# Patient Record
Sex: FEMALE | Employment: FULL TIME | URBAN - METROPOLITAN AREA
[De-identification: names, ages, dates, MRNs, and addresses within clinical notes are randomized per-mention and may not be internally consistent; named-entity substitution may affect disease eponyms.]

---

## 2018-12-28 ENCOUNTER — OFFICE VISIT (OUTPATIENT)
Dept: FAMILY MEDICINE CLINIC | Age: 58
End: 2018-12-28

## 2018-12-28 VITALS
HEART RATE: 84 BPM | RESPIRATION RATE: 18 BRPM | BODY MASS INDEX: 26.13 KG/M2 | DIASTOLIC BLOOD PRESSURE: 65 MMHG | HEIGHT: 66 IN | WEIGHT: 162.6 LBS | OXYGEN SATURATION: 99 % | TEMPERATURE: 96.5 F | SYSTOLIC BLOOD PRESSURE: 101 MMHG

## 2018-12-28 DIAGNOSIS — H65.91 RIGHT NON-SUPPURATIVE OTITIS MEDIA: Primary | ICD-10-CM

## 2018-12-28 DIAGNOSIS — H92.03 OTALGIA OF BOTH EARS: ICD-10-CM

## 2018-12-28 RX ORDER — PSEUDOEPHEDRINE HCL 30 MG
60 TABLET ORAL 3 TIMES DAILY
Qty: 24 TAB | Refills: 1 | Status: SHIPPED | OUTPATIENT
Start: 2018-12-28 | End: 2018-12-31

## 2018-12-28 RX ORDER — CONJUGATED ESTROGENS AND MEDROXYPROGESTERONE ACETATE .625; 2.5 MG/1; MG/1
TABLET, SUGAR COATED ORAL
Refills: 99 | COMMUNITY
Start: 2018-10-10 | End: 2021-01-01

## 2018-12-28 RX ORDER — ALPRAZOLAM 1 MG/1
TABLET ORAL
Refills: 0 | COMMUNITY
Start: 2018-10-09 | End: 2021-01-01 | Stop reason: SDUPTHER

## 2018-12-28 RX ORDER — AZITHROMYCIN 250 MG/1
TABLET, FILM COATED ORAL
Qty: 6 TAB | Refills: 0 | Status: SHIPPED | OUTPATIENT
Start: 2018-12-28 | End: 2021-01-01

## 2018-12-28 RX ORDER — ROSUVASTATIN CALCIUM 10 MG/1
TABLET, COATED ORAL
Refills: 99 | COMMUNITY
Start: 2018-12-02 | End: 2022-01-01

## 2018-12-28 NOTE — PATIENT INSTRUCTIONS
Earache: Care Instructions  Your Care Instructions    Even though infection is a common cause of ear pain, not all ear pain means an infection. If you have ear pain and don't have an infection, it could be because of a jaw problem, such as temporomandibular joint (TMJ) pain. Or it could be because of a neck problem. When ear discomfort or pain is mild or comes and goes without other symptoms, home treatment may be all you need. Follow-up care is a key part of your treatment and safety. Be sure to make and go to all appointments, and call your doctor if you are having problems. It's also a good idea to know your test results and keep a list of the medicines you take. How can you care for yourself at home? · Apply heat on the ear to ease pain. To apply heat, put a warm water bottle, a heating pad set on low, or a warm cloth on your ear. Do not go to sleep with a heating pad on your skin. · Take an over-the-counter pain medicine, such as acetaminophen (Tylenol), ibuprofen (Advil, Motrin), or naproxen (Aleve). Be safe with medicines. Read and follow all instructions on the label. · Do not take two or more pain medicines at the same time unless the doctor told you to. Many pain medicines have acetaminophen, which is Tylenol. Too much acetaminophen (Tylenol) can be harmful. · Never insert anything, such as a cotton swab or a devante pin, into the ear. When should you call for help? Call your doctor now or seek immediate medical care if:    · You have new or worse symptoms of infection, such as:  ? Increased pain, swelling, warmth, or redness. ? Red streaks leading from the area. ? Pus draining from the area. ? A fever.    Watch closely for changes in your health, and be sure to contact your doctor if:    · You have new or worse discharge coming from the ear.     · You do not get better as expected. Where can you learn more? Go to http://abdirashid-daniela.info/.   Enter A515 in the search box to learn more about \"Earache: Care Instructions. \"  Current as of: March 28, 2018  Content Version: 11.8  © 1438-9787 [employer]wise, [employer]. Care instructions adapted under license by [employer] (which disclaims liability or warranty for this information). If you have questions about a medical condition or this instruction, always ask your healthcare professional. [address] any warranty or liability for your use of this information.

## 2018-12-28 NOTE — PROGRESS NOTES
Subjective:      Anand Villavicencio is a 66 y.o. female who presents for possible ear infection. Symptoms include bilateral ear pain. Onset of symptoms was 3 days ago, gradually worsening since that time. Associated symptoms include congestion, which have been present for 2 days . She is drinking plenty of fluids. Problem List:   There are no active problems to display for this patient. Medical History:     Past Medical History:   Diagnosis Date    Hypercholesterolemia      Allergies:   No Known Allergies   Medications:     Current Outpatient Medications   Medication Sig    ALPRAZolam (XANAX) 1 mg tablet TAKE 1 TABLET BY MOUTH EVERY 4 HOURS AS NEEDED FOR ANXIETY    PREMPRO 0.625-2.5 mg per tablet TAKE 1 TABLET BY MOUTH EVERY DAY ONCE A DAY ORALLY 28    rosuvastatin (CRESTOR) 10 mg tablet TAKE 1 TABLET BY MOUTH EVERY DAY    azithromycin (ZITHROMAX) 250 mg tablet Take by mouth, take two tablets today then one tablet daily for 4 more days.  pseudoephedrine (SUDAFED) 30 mg tablet Take 2 Tabs by mouth three (3) times daily for 3 days. No current facility-administered medications for this visit. Surgical History:     Past Surgical History:   Procedure Laterality Date    HX BREAST AUGMENTATION      HX HERNIA REPAIR       Social History:     Social History     Socioeconomic History    Marital status: UNKNOWN     Spouse name: Not on file    Number of children: Not on file    Years of education: Not on file    Highest education level: Not on file   Tobacco Use    Smoking status: Former Smoker     Years: 30.00     Last attempt to quit: 2016     Years since quittin.9    Smokeless tobacco: Never Used   Substance and Sexual Activity    Alcohol use: Yes     Alcohol/week: 1.2 oz     Types: 2 Cans of beer per week     Comment: socially    Drug use: No    Sexual activity: Not Currently         Objective:     ROS:   Feeling well. No dyspnea or chest pain on exertion.   No abdominal pain, change in bowel habits, black or bloody stools. No urinary tract symptoms. GYN ROS: normal menses, no abnormal bleeding, pelvic pain or discharge, no breast pain or new or enlarging lumps on self exam. No neurological complaints. OBJECTIVE:   The patient appears well, alert, oriented x 3, in no distress. Visit Vitals  /65 (BP 1 Location: Left arm, BP Patient Position: Sitting)   Pulse 84   Temp 96.5 °F (35.8 °C) (Oral)   Resp 18   Ht 5' 6\" (1.676 m)   Wt 162 lb 9.6 oz (73.8 kg)   SpO2 99%   BMI 26.24 kg/m²     HEENT:ENT left ear normal, right TM with bulge, erythema. Neck supple. No adenopathy or thyromegaly. SLADE. Chest: Lungs are clear, good air entry, no wheezes, rhonchi or rales. Cardiovascular: S1 and S2 normal, no murmurs, regular rate and rhythm. Abdomen: soft without tenderness, guarding, mass or organomegaly. Extremities: show no edema, normal peripheral pulses. Neurological: is normal, no focal findings. BREAST EXAM: breasts appear normal, no suspicious masses, no skin or nipple changes or axillary nodes    PELVIC EXAM: normal external genitalia, vulva, vagina, cervix, uterus and adnexa    Assessment/Plan:       ICD-10-CM ICD-9-CM    1. Right non-suppurative otitis media H65.91 381.4 azithromycin (ZITHROMAX) 250 mg tablet      pseudoephedrine (SUDAFED) 30 mg tablet   2. Otalgia of both ears H92.03 388.70 REFERRAL TO PRIMARY CARE   .

## 2018-12-28 NOTE — PROGRESS NOTES
Grace Lam is a 66 y.o. female    Patient went to [facility] 10 days for ear pain amoxicillin prescribed amoxicillin in [facility] she complete    No chief complaint on file. 1. Have you been to the ER, urgent care clinic since your last visit? Hospitalized since your last visit? No  M  2. Have you seen or consulted any other health care providers outside of the [address] [place] [address] since your last visit? Include any pap smears or colon screening. No      Visit Vitals  /65 (BP 1 Location: Left arm, BP Patient Position: Sitting)   Pulse 84   Temp 96.5 °F (35.8 °C) (Oral)   Resp 18   Ht 5' 6\" (1.676 m)   Wt 162 lb 9.6 oz (73.8 kg)   SpO2 99%   BMI 26.24 kg/m²           Health Maintenance Due   Topic Date Due    DTaP/Tdap/Td series (1 - Tdap) 06/29/1983    PAP AKA CERVICAL CYTOLOGY  06/29/1983    Shingrix Vaccine Age 50> (1 of 2) 06/29/2012    BREAST CANCER SCRN MAMMOGRAM  06/29/2012    FOBT Q 1 YEAR AGE 50-75  06/29/2012    Influenza Age 9 to Adult  08/01/2018         Medication Reconciliation completed, changes noted.   Please  Update medication list.

## 2019-08-12 ENCOUNTER — OFFICE VISIT (OUTPATIENT)
Dept: FAMILY MEDICINE CLINIC | Age: 59
End: 2019-08-12

## 2019-08-12 VITALS
TEMPERATURE: 97.8 F | SYSTOLIC BLOOD PRESSURE: 100 MMHG | WEIGHT: 151 LBS | HEIGHT: 66 IN | OXYGEN SATURATION: 97 % | BODY MASS INDEX: 24.27 KG/M2 | DIASTOLIC BLOOD PRESSURE: 51 MMHG | HEART RATE: 88 BPM

## 2019-08-12 DIAGNOSIS — R21 RASH: ICD-10-CM

## 2019-08-12 DIAGNOSIS — B34.9 VIRAL ILLNESS: Primary | ICD-10-CM

## 2019-08-12 RX ORDER — METHYLPREDNISOLONE 4 MG/1
TABLET ORAL
Qty: 1 DOSE PACK | Refills: 0 | Status: SHIPPED | OUTPATIENT
Start: 2019-08-12 | End: 2021-01-01

## 2019-08-12 NOTE — PROGRESS NOTES
HISTORY OF PRESENT ILLNESS  Reymundo Lynn is a 64 y.o. female. Patient reports itchy rash of palms of hands and soles of feet since yesterday. No blisters. She also noticed a rash around her mouth today. It is very itchy also. She has tried benadryl with little to no relief. No new foods, medications, or skin care products. She did clean out the horse barn the other day. She has a grandson that she has been around, but he doesn't have any similar symptoms. Denies fever, nausea, joint or body aches. Main complaint is itching. No pain. Visit Vitals  /51 (BP 1 Location: Right arm, BP Patient Position: Sitting)   Pulse 88   Temp 97.8 °F (36.6 °C) (Oral)   Ht 5' 6\" (1.676 m)   Wt 151 lb (68.5 kg)   SpO2 97%   BMI 24.37 kg/m²       HPI    Review of Systems   Skin: Positive for itching and rash. Physical Exam   Constitutional: She is oriented to person, place, and time. She appears well-developed and well-nourished. HENT:   Mouth/Throat: Uvula is midline and mucous membranes are normal. Posterior oropharyngeal erythema (mild, right side appears to be small blister post-pharyngeal) present. Neurological: She is alert and oriented to person, place, and time. Skin: Rash (confluent macular rash of soles of feet and palms of hands; small sandpaper-like rash around mouth;; no blisters or hives) noted. Psychiatric: She has a normal mood and affect. Non-tender with palpation of rash  ASSESSMENT and PLAN    ICD-10-CM ICD-9-CM    1. Viral illness B34.9 079.99    2.  Rash R21 782.1      Orders Placed This Encounter    methylPREDNISolone (MEDROL DOSEPACK) 4 mg tablet   patient is going on vacation x 2 weeks-patient has developed rash that has progressed to hives in the past; no known trigger this time; she requests steroids in case it gets worse and progresses to hives    Most likely Hand, Foot, Mouth    Advised patient this rash seems to be Viral   Follow-up if rash changes or worsens  Consider labs if symptoms worsen

## 2021-01-01 ENCOUNTER — HOSPITAL ENCOUNTER (OUTPATIENT)
Dept: MRI IMAGING | Age: 61
Discharge: HOME | End: 2021-12-01
Attending: NEUROLOGICAL SURGERY
Payer: COMMERCIAL

## 2021-01-01 ENCOUNTER — TELEPHONE (OUTPATIENT)
Dept: ONCOLOGY | Age: 61
End: 2021-01-01

## 2021-01-01 ENCOUNTER — TELEPHONE (OUTPATIENT)
Dept: PALLATIVE CARE | Age: 61
End: 2021-01-01

## 2021-01-01 ENCOUNTER — TRANSCRIBE ORDER (OUTPATIENT)
Dept: SCHEDULING | Age: 61
End: 2021-01-01

## 2021-01-01 ENCOUNTER — HOSPITAL ENCOUNTER (OUTPATIENT)
Dept: CT IMAGING | Age: 61
Discharge: HOME | End: 2021-07-23
Attending: PHYSICIAN ASSISTANT
Payer: COMMERCIAL

## 2021-01-01 ENCOUNTER — APPOINTMENT (OUTPATIENT)
Dept: GENERAL RADIOLOGY | Age: 61
DRG: 025 | End: 2021-01-01
Attending: ANESTHESIOLOGY
Payer: COMMERCIAL

## 2021-01-01 ENCOUNTER — OFFICE VISIT (OUTPATIENT)
Dept: ONCOLOGY | Age: 61
End: 2021-01-01
Payer: COMMERCIAL

## 2021-01-01 ENCOUNTER — HOSPITAL ENCOUNTER (OUTPATIENT)
Dept: RADIATION THERAPY | Age: 61
Discharge: HOME | End: 2021-10-21

## 2021-01-01 ENCOUNTER — TELEPHONE (OUTPATIENT)
Dept: FAMILY MEDICINE CLINIC | Age: 61
End: 2021-01-01

## 2021-01-01 ENCOUNTER — HOSPITAL ENCOUNTER (OUTPATIENT)
Dept: MRI IMAGING | Age: 61
Discharge: HOME | End: 2021-09-12
Attending: NURSE PRACTITIONER
Payer: COMMERCIAL

## 2021-01-01 ENCOUNTER — APPOINTMENT (OUTPATIENT)
Dept: CT IMAGING | Age: 61
DRG: 025 | End: 2021-01-01
Attending: NEUROLOGICAL SURGERY
Payer: COMMERCIAL

## 2021-01-01 ENCOUNTER — ANESTHESIA EVENT (OUTPATIENT)
Dept: SURGERY | Age: 61
DRG: 025 | End: 2021-01-01
Payer: COMMERCIAL

## 2021-01-01 ENCOUNTER — DOCUMENTATION ONLY (OUTPATIENT)
Dept: ONCOLOGY | Age: 61
End: 2021-01-01

## 2021-01-01 ENCOUNTER — HOSPITAL ENCOUNTER (OUTPATIENT)
Dept: RADIATION THERAPY | Age: 61
Discharge: HOME | End: 2021-10-19

## 2021-01-01 ENCOUNTER — APPOINTMENT (OUTPATIENT)
Dept: CT IMAGING | Age: 61
End: 2021-01-01
Attending: INTERNAL MEDICINE
Payer: COMMERCIAL

## 2021-01-01 ENCOUNTER — HOSPITAL ENCOUNTER (OUTPATIENT)
Dept: INFUSION THERAPY | Age: 61
Discharge: HOME | End: 2021-09-16
Payer: COMMERCIAL

## 2021-01-01 ENCOUNTER — HOSPITAL ENCOUNTER (OUTPATIENT)
Dept: INFUSION THERAPY | Age: 61
Discharge: HOME | End: 2021-12-16
Payer: COMMERCIAL

## 2021-01-01 ENCOUNTER — HOSPITAL ENCOUNTER (OUTPATIENT)
Dept: INFUSION THERAPY | Age: 61
Discharge: HOME | End: 2021-10-28
Payer: COMMERCIAL

## 2021-01-01 ENCOUNTER — HOSPITAL ENCOUNTER (OUTPATIENT)
Dept: MRI IMAGING | Age: 61
Discharge: HOME | End: 2021-12-01
Attending: RADIOLOGY
Payer: COMMERCIAL

## 2021-01-01 ENCOUNTER — PATIENT OUTREACH (OUTPATIENT)
Dept: OTHER | Age: 61
End: 2021-01-01

## 2021-01-01 ENCOUNTER — HOSPITAL ENCOUNTER (OUTPATIENT)
Dept: RADIATION THERAPY | Age: 61
Discharge: HOME | End: 2021-09-22

## 2021-01-01 ENCOUNTER — HOSPITAL ENCOUNTER (OUTPATIENT)
Dept: MAMMOGRAPHY | Age: 61
Discharge: HOME | End: 2021-08-27
Attending: INTERNAL MEDICINE
Payer: COMMERCIAL

## 2021-01-01 ENCOUNTER — HOSPITAL ENCOUNTER (INPATIENT)
Age: 61
LOS: 2 days | Discharge: HOME | DRG: 025 | End: 2021-08-13
Attending: NEUROLOGICAL SURGERY | Admitting: NEUROLOGICAL SURGERY
Payer: COMMERCIAL

## 2021-01-01 ENCOUNTER — HOSPITAL ENCOUNTER (OUTPATIENT)
Age: 61
Setting detail: OUTPATIENT SURGERY
Discharge: HOME | End: 2021-07-30
Attending: INTERNAL MEDICINE | Admitting: INTERNAL MEDICINE
Payer: COMMERCIAL

## 2021-01-01 ENCOUNTER — HOSPITAL ENCOUNTER (OUTPATIENT)
Dept: MAMMOGRAPHY | Age: 61
Discharge: HOME | End: 2021-08-27
Attending: REGISTERED NURSE
Payer: COMMERCIAL

## 2021-01-01 ENCOUNTER — VIRTUAL VISIT (OUTPATIENT)
Dept: PALLATIVE CARE | Age: 61
End: 2021-01-01
Payer: COMMERCIAL

## 2021-01-01 ENCOUNTER — HOSPITAL ENCOUNTER (OUTPATIENT)
Dept: PET IMAGING | Age: 61
Discharge: HOME | End: 2021-08-16
Attending: INTERNAL MEDICINE
Payer: COMMERCIAL

## 2021-01-01 ENCOUNTER — OFFICE VISIT (OUTPATIENT)
Dept: PALLATIVE CARE | Age: 61
End: 2021-01-01
Payer: COMMERCIAL

## 2021-01-01 ENCOUNTER — APPOINTMENT (OUTPATIENT)
Dept: INFUSION THERAPY | Age: 61
End: 2021-01-01

## 2021-01-01 ENCOUNTER — ANESTHESIA (OUTPATIENT)
Dept: SURGERY | Age: 61
DRG: 025 | End: 2021-01-01
Payer: COMMERCIAL

## 2021-01-01 ENCOUNTER — HOSPITAL ENCOUNTER (OUTPATIENT)
Dept: PREADMISSION TESTING | Age: 61
Discharge: HOME | End: 2021-08-10
Payer: COMMERCIAL

## 2021-01-01 ENCOUNTER — HOSPITAL ENCOUNTER (OUTPATIENT)
Dept: MRI IMAGING | Age: 61
Discharge: HOME | End: 2021-08-03
Attending: INTERNAL MEDICINE
Payer: COMMERCIAL

## 2021-01-01 ENCOUNTER — ANESTHESIA EVENT (OUTPATIENT)
Dept: ENDOSCOPY | Age: 61
End: 2021-01-01
Payer: COMMERCIAL

## 2021-01-01 ENCOUNTER — ANESTHESIA (OUTPATIENT)
Dept: ENDOSCOPY | Age: 61
End: 2021-01-01
Payer: COMMERCIAL

## 2021-01-01 VITALS
BODY MASS INDEX: 25.5 KG/M2 | TEMPERATURE: 97.5 F | OXYGEN SATURATION: 100 % | SYSTOLIC BLOOD PRESSURE: 101 MMHG | RESPIRATION RATE: 18 BRPM | DIASTOLIC BLOOD PRESSURE: 56 MMHG | HEART RATE: 95 BPM | HEIGHT: 66 IN

## 2021-01-01 VITALS
SYSTOLIC BLOOD PRESSURE: 99 MMHG | RESPIRATION RATE: 21 BRPM | DIASTOLIC BLOOD PRESSURE: 63 MMHG | OXYGEN SATURATION: 99 % | HEART RATE: 86 BPM

## 2021-01-01 VITALS
DIASTOLIC BLOOD PRESSURE: 69 MMHG | BODY MASS INDEX: 25.39 KG/M2 | WEIGHT: 158 LBS | TEMPERATURE: 97.1 F | HEIGHT: 66 IN | SYSTOLIC BLOOD PRESSURE: 112 MMHG | OXYGEN SATURATION: 98 % | HEART RATE: 91 BPM

## 2021-01-01 VITALS
OXYGEN SATURATION: 94 % | BODY MASS INDEX: 25.05 KG/M2 | DIASTOLIC BLOOD PRESSURE: 69 MMHG | SYSTOLIC BLOOD PRESSURE: 105 MMHG | HEIGHT: 66 IN | HEART RATE: 84 BPM | TEMPERATURE: 97.8 F | RESPIRATION RATE: 13 BRPM | WEIGHT: 155.87 LBS

## 2021-01-01 VITALS
DIASTOLIC BLOOD PRESSURE: 77 MMHG | HEIGHT: 66 IN | HEART RATE: 79 BPM | TEMPERATURE: 98.1 F | WEIGHT: 151.9 LBS | BODY MASS INDEX: 24.41 KG/M2 | OXYGEN SATURATION: 99 % | SYSTOLIC BLOOD PRESSURE: 128 MMHG

## 2021-01-01 VITALS — HEIGHT: 66 IN | BODY MASS INDEX: 24.75 KG/M2 | WEIGHT: 154 LBS

## 2021-01-01 VITALS
WEIGHT: 157.2 LBS | OXYGEN SATURATION: 98 % | HEIGHT: 66 IN | BODY MASS INDEX: 25.26 KG/M2 | DIASTOLIC BLOOD PRESSURE: 70 MMHG | SYSTOLIC BLOOD PRESSURE: 104 MMHG | HEART RATE: 95 BPM | TEMPERATURE: 97.8 F

## 2021-01-01 VITALS
OXYGEN SATURATION: 97 % | HEART RATE: 101 BPM | DIASTOLIC BLOOD PRESSURE: 69 MMHG | RESPIRATION RATE: 20 BRPM | TEMPERATURE: 98.3 F | SYSTOLIC BLOOD PRESSURE: 105 MMHG

## 2021-01-01 VITALS
BODY MASS INDEX: 24.81 KG/M2 | HEART RATE: 96 BPM | HEIGHT: 66 IN | OXYGEN SATURATION: 98 % | WEIGHT: 154.4 LBS | TEMPERATURE: 97.5 F | SYSTOLIC BLOOD PRESSURE: 99 MMHG | DIASTOLIC BLOOD PRESSURE: 60 MMHG

## 2021-01-01 VITALS
SYSTOLIC BLOOD PRESSURE: 120 MMHG | DIASTOLIC BLOOD PRESSURE: 65 MMHG | HEIGHT: 66 IN | HEART RATE: 110 BPM | OXYGEN SATURATION: 97 % | TEMPERATURE: 97.9 F | WEIGHT: 154.7 LBS | BODY MASS INDEX: 24.86 KG/M2

## 2021-01-01 VITALS
TEMPERATURE: 96.7 F | HEIGHT: 66 IN | DIASTOLIC BLOOD PRESSURE: 57 MMHG | HEART RATE: 96 BPM | SYSTOLIC BLOOD PRESSURE: 92 MMHG | BODY MASS INDEX: 23.24 KG/M2 | OXYGEN SATURATION: 97 % | WEIGHT: 144.6 LBS

## 2021-01-01 VITALS
SYSTOLIC BLOOD PRESSURE: 116 MMHG | RESPIRATION RATE: 16 BRPM | DIASTOLIC BLOOD PRESSURE: 77 MMHG | TEMPERATURE: 97.1 F | HEART RATE: 90 BPM

## 2021-01-01 VITALS
SYSTOLIC BLOOD PRESSURE: 112 MMHG | DIASTOLIC BLOOD PRESSURE: 62 MMHG | WEIGHT: 144.6 LBS | BODY MASS INDEX: 23.24 KG/M2 | HEIGHT: 66 IN | RESPIRATION RATE: 16 BRPM | HEART RATE: 82 BPM | TEMPERATURE: 96.7 F

## 2021-01-01 DIAGNOSIS — F17.200 SMOKER: ICD-10-CM

## 2021-01-01 DIAGNOSIS — D75.839 THROMBOCYTOSIS: ICD-10-CM

## 2021-01-01 DIAGNOSIS — C79.31 BRAIN METASTASES (HCC): ICD-10-CM

## 2021-01-01 DIAGNOSIS — C79.51 BONE METASTASES (HCC): ICD-10-CM

## 2021-01-01 DIAGNOSIS — C34.90 NON-SMALL CELL LUNG CANCER, UNSPECIFIED LATERALITY (HCC): ICD-10-CM

## 2021-01-01 DIAGNOSIS — K59.04 CHRONIC IDIOPATHIC CONSTIPATION: ICD-10-CM

## 2021-01-01 DIAGNOSIS — R68.83 CHILLS: ICD-10-CM

## 2021-01-01 DIAGNOSIS — R10.84 ABDOMINAL PAIN, GENERALIZED: ICD-10-CM

## 2021-01-01 DIAGNOSIS — F41.9 ANXIETY: ICD-10-CM

## 2021-01-01 DIAGNOSIS — R91.8 LUNG MASS: ICD-10-CM

## 2021-01-01 DIAGNOSIS — Z87.898 HISTORY OF HEADACHE: ICD-10-CM

## 2021-01-01 DIAGNOSIS — C79.51 BONE METASTASIS (HCC): Primary | ICD-10-CM

## 2021-01-01 DIAGNOSIS — M54.2 NECK PAIN: ICD-10-CM

## 2021-01-01 DIAGNOSIS — C34.90 METASTATIC NON-SMALL CELL LUNG CANCER (HCC): Primary | ICD-10-CM

## 2021-01-01 DIAGNOSIS — Z51.12 ENCOUNTER FOR ANTINEOPLASTIC IMMUNOTHERAPY: ICD-10-CM

## 2021-01-01 DIAGNOSIS — M25.551 RIGHT HIP PAIN: ICD-10-CM

## 2021-01-01 DIAGNOSIS — C79.51 BONE METASTASIS (HCC): ICD-10-CM

## 2021-01-01 DIAGNOSIS — C79.31 BRAIN METASTASIS (HCC): Primary | ICD-10-CM

## 2021-01-01 DIAGNOSIS — R51.9 NONINTRACTABLE HEADACHE, UNSPECIFIED CHRONICITY PATTERN, UNSPECIFIED HEADACHE TYPE: ICD-10-CM

## 2021-01-01 DIAGNOSIS — C34.90 METASTATIC NON-SMALL CELL LUNG CANCER (HCC): ICD-10-CM

## 2021-01-01 DIAGNOSIS — G93.89 BRAIN MASS: ICD-10-CM

## 2021-01-01 DIAGNOSIS — M25.50 ARTHRALGIA, UNSPECIFIED JOINT: ICD-10-CM

## 2021-01-01 DIAGNOSIS — M84.376S STRESS FRACTURE OF FOOT, UNSPECIFIED LATERALITY, SEQUELA: ICD-10-CM

## 2021-01-01 DIAGNOSIS — G89.3 CANCER RELATED PAIN: ICD-10-CM

## 2021-01-01 DIAGNOSIS — R53.0 NEOPLASTIC MALIGNANT RELATED FATIGUE: ICD-10-CM

## 2021-01-01 DIAGNOSIS — R91.8 LUNG MASS: Primary | ICD-10-CM

## 2021-01-01 DIAGNOSIS — R16.0 LIVER MASS: ICD-10-CM

## 2021-01-01 DIAGNOSIS — F32.A DEPRESSION, UNSPECIFIED DEPRESSION TYPE: ICD-10-CM

## 2021-01-01 DIAGNOSIS — C34.90 NON-SMALL CELL LUNG CANCER, UNSPECIFIED LATERALITY (HCC): Primary | ICD-10-CM

## 2021-01-01 DIAGNOSIS — C79.9 METASTATIC CARCINOMA (HCC): ICD-10-CM

## 2021-01-01 DIAGNOSIS — R10.9 FLANK PAIN: ICD-10-CM

## 2021-01-01 DIAGNOSIS — M89.9 BONE LESION: ICD-10-CM

## 2021-01-01 DIAGNOSIS — F41.9 CHRONIC ANXIETY: ICD-10-CM

## 2021-01-01 DIAGNOSIS — F41.8 ANXIETY ABOUT HEALTH: ICD-10-CM

## 2021-01-01 DIAGNOSIS — E27.8 ADRENAL MASS, LEFT (HCC): ICD-10-CM

## 2021-01-01 DIAGNOSIS — C79.9 METASTATIC CARCINOMA (HCC): Primary | ICD-10-CM

## 2021-01-01 DIAGNOSIS — F41.9 ANXIETY: Primary | ICD-10-CM

## 2021-01-01 DIAGNOSIS — R10.84 ABDOMINAL PAIN, GENERALIZED: Primary | ICD-10-CM

## 2021-01-01 DIAGNOSIS — M89.8X1 PAIN OF RIGHT SCAPULA: Primary | ICD-10-CM

## 2021-01-01 DIAGNOSIS — M89.8X1 PAIN OF RIGHT SCAPULA: ICD-10-CM

## 2021-01-01 DIAGNOSIS — C79.31 BRAIN METASTASIS (HCC): ICD-10-CM

## 2021-01-01 DIAGNOSIS — C34.11 MALIGNANT NEOPLASM OF UPPER LOBE OF RIGHT LUNG (HCC): ICD-10-CM

## 2021-01-01 DIAGNOSIS — R53.83 FATIGUE, UNSPECIFIED TYPE: ICD-10-CM

## 2021-01-01 DIAGNOSIS — D64.9 ANEMIA, UNSPECIFIED TYPE: ICD-10-CM

## 2021-01-01 DIAGNOSIS — Z12.31 VISIT FOR SCREENING MAMMOGRAM: ICD-10-CM

## 2021-01-01 LAB
ABO + RH BLD: NORMAL
ACID FAST STN SPEC: NEGATIVE
ACID FAST STN SPEC: NEGATIVE
ALBUMIN SERPL-MCNC: 2.5 G/DL (ref 3.5–5)
ALBUMIN SERPL-MCNC: 3.1 G/DL (ref 3.5–5)
ALBUMIN SERPL-MCNC: 3.5 G/DL (ref 3.5–5)
ALBUMIN SERPL-MCNC: 4.5 G/DL (ref 3.8–4.9)
ALBUMIN/GLOB SERPL: 0.4 {RATIO} (ref 1.1–2.2)
ALBUMIN/GLOB SERPL: 0.7 {RATIO} (ref 1.1–2.2)
ALBUMIN/GLOB SERPL: 0.9 {RATIO} (ref 1.1–2.2)
ALBUMIN/GLOB SERPL: 1.6 {RATIO} (ref 1.2–2.2)
ALP SERPL-CCNC: 117 U/L (ref 45–117)
ALP SERPL-CCNC: 148 U/L (ref 45–117)
ALP SERPL-CCNC: 329 U/L (ref 45–117)
ALP SERPL-CCNC: 87 IU/L (ref 48–121)
ALT SERPL-CCNC: 15 IU/L (ref 0–32)
ALT SERPL-CCNC: 17 U/L (ref 12–78)
ALT SERPL-CCNC: 18 U/L (ref 12–78)
ALT SERPL-CCNC: 57 U/L (ref 12–78)
ANION GAP SERPL CALC-SCNC: 5 MMOL/L (ref 5–15)
ANION GAP SERPL CALC-SCNC: 7 MMOL/L (ref 5–15)
ANION GAP SERPL CALC-SCNC: 8 MMOL/L (ref 5–15)
AST SERPL-CCNC: 13 U/L (ref 15–37)
AST SERPL-CCNC: 14 U/L (ref 15–37)
AST SERPL-CCNC: 16 IU/L (ref 0–40)
AST SERPL-CCNC: 29 U/L (ref 15–37)
ATRIAL RATE: 72 BPM
BACTERIA SPEC CULT: ABNORMAL
BACTERIA SPEC CULT: ABNORMAL
BACTERIA SPEC CULT: NORMAL
BACTERIA SPEC CULT: NORMAL
BASOPHILS # BLD AUTO: 0.1 X10E3/UL (ref 0–0.2)
BASOPHILS # BLD: 0 K/UL (ref 0–0.1)
BASOPHILS # BLD: 0 K/UL (ref 0–0.1)
BASOPHILS # BLD: 0.1 K/UL (ref 0–0.1)
BASOPHILS # BLD: 0.2 K/UL (ref 0–0.1)
BASOPHILS NFR BLD AUTO: 1 %
BASOPHILS NFR BLD: 0 % (ref 0–1)
BASOPHILS NFR BLD: 1 % (ref 0–1)
BILIRUB SERPL-MCNC: 0.2 MG/DL (ref 0.2–1)
BILIRUB SERPL-MCNC: 0.3 MG/DL (ref 0.2–1)
BILIRUB SERPL-MCNC: 0.3 MG/DL (ref 0.2–1)
BILIRUB SERPL-MCNC: <0.2 MG/DL (ref 0–1.2)
BLOOD GROUP ANTIBODIES SERPL: NORMAL
BUN SERPL-MCNC: 11 MG/DL (ref 6–24)
BUN SERPL-MCNC: 7 MG/DL (ref 6–20)
BUN SERPL-MCNC: 8 MG/DL (ref 6–20)
BUN SERPL-MCNC: 9 MG/DL (ref 6–20)
BUN/CREAT SERPL: 11 (ref 12–20)
BUN/CREAT SERPL: 13 (ref 12–20)
BUN/CREAT SERPL: 13 (ref 9–23)
BUN/CREAT SERPL: 9 (ref 12–20)
CALCIUM SERPL-MCNC: 10.2 MG/DL (ref 8.7–10.2)
CALCIUM SERPL-MCNC: 9.4 MG/DL (ref 8.5–10.1)
CALCIUM SERPL-MCNC: 9.6 MG/DL (ref 8.5–10.1)
CALCIUM SERPL-MCNC: 9.9 MG/DL (ref 8.5–10.1)
CALCULATED P AXIS, ECG09: 40 DEGREES
CALCULATED R AXIS, ECG10: 23 DEGREES
CALCULATED T AXIS, ECG11: 62 DEGREES
CHLORIDE SERPL-SCNC: 103 MMOL/L (ref 97–108)
CHLORIDE SERPL-SCNC: 103 MMOL/L (ref 97–108)
CHLORIDE SERPL-SCNC: 105 MMOL/L (ref 96–106)
CHLORIDE SERPL-SCNC: 107 MMOL/L (ref 97–108)
CO2 SERPL-SCNC: 22 MMOL/L (ref 21–32)
CO2 SERPL-SCNC: 23 MMOL/L (ref 20–29)
CO2 SERPL-SCNC: 23 MMOL/L (ref 21–32)
CO2 SERPL-SCNC: 25 MMOL/L (ref 21–32)
CREAT SERPL-MCNC: 0.7 MG/DL (ref 0.55–1.02)
CREAT SERPL-MCNC: 0.71 MG/DL (ref 0.55–1.02)
CREAT SERPL-MCNC: 0.78 MG/DL (ref 0.55–1.02)
CREAT SERPL-MCNC: 0.82 MG/DL (ref 0.57–1)
DIAGNOSIS, 93000: NORMAL
DIFFERENTIAL METHOD BLD: ABNORMAL
EOSINOPHIL # BLD AUTO: 0.1 X10E3/UL (ref 0–0.4)
EOSINOPHIL # BLD: 0 K/UL (ref 0–0.4)
EOSINOPHIL # BLD: 0 K/UL (ref 0–0.4)
EOSINOPHIL # BLD: 0.2 K/UL (ref 0–0.4)
EOSINOPHIL # BLD: 0.4 K/UL (ref 0–0.4)
EOSINOPHIL NFR BLD AUTO: 1 %
EOSINOPHIL NFR BLD: 0 % (ref 0–7)
EOSINOPHIL NFR BLD: 0 % (ref 0–7)
EOSINOPHIL NFR BLD: 1 % (ref 0–7)
EOSINOPHIL NFR BLD: 2 % (ref 0–7)
ERYTHROCYTE [DISTWIDTH] IN BLOOD BY AUTOMATED COUNT: 11.9 % (ref 11.7–15.4)
ERYTHROCYTE [DISTWIDTH] IN BLOOD BY AUTOMATED COUNT: 12.8 % (ref 11.5–14.5)
ERYTHROCYTE [DISTWIDTH] IN BLOOD BY AUTOMATED COUNT: 13.9 % (ref 11.5–14.5)
ERYTHROCYTE [DISTWIDTH] IN BLOOD BY AUTOMATED COUNT: 15.2 % (ref 11.5–14.5)
ERYTHROCYTE [DISTWIDTH] IN BLOOD BY AUTOMATED COUNT: 16.8 % (ref 11.5–14.5)
FERRITIN SERPL-MCNC: 1539 NG/ML (ref 26–388)
GLOBULIN SER CALC-MCNC: 2.9 G/DL (ref 1.5–4.5)
GLOBULIN SER CALC-MCNC: 4 G/DL (ref 2–4)
GLOBULIN SER CALC-MCNC: 4.7 G/DL (ref 2–4)
GLOBULIN SER CALC-MCNC: 6.4 G/DL (ref 2–4)
GLUCOSE BLD STRIP.AUTO-MCNC: 109 MG/DL (ref 65–117)
GLUCOSE SERPL-MCNC: 106 MG/DL (ref 65–100)
GLUCOSE SERPL-MCNC: 79 MG/DL (ref 65–100)
GLUCOSE SERPL-MCNC: 88 MG/DL (ref 65–100)
GLUCOSE SERPL-MCNC: 94 MG/DL (ref 65–99)
GRAM STN SPEC: ABNORMAL
HBV CORE AB SERPL QL IA: NEGATIVE
HBV CORE IGM SER QL: NONREACTIVE
HBV SURFACE AB SER QL: NONREACTIVE
HBV SURFACE AB SER-ACNC: <3.1 MIU/ML
HBV SURFACE AG SER QL: <0.1 INDEX
HBV SURFACE AG SER QL: NEGATIVE
HCT VFR BLD AUTO: 28.5 % (ref 35–47)
HCT VFR BLD AUTO: 33.5 % (ref 35–47)
HCT VFR BLD AUTO: 34.7 % (ref 35–47)
HCT VFR BLD AUTO: 38.3 % (ref 35–47)
HCT VFR BLD AUTO: 39.8 % (ref 34–46.6)
HGB BLD-MCNC: 10.2 G/DL (ref 11.5–16)
HGB BLD-MCNC: 10.9 G/DL (ref 11.5–16)
HGB BLD-MCNC: 12.9 G/DL (ref 11.5–16)
HGB BLD-MCNC: 13.3 G/DL (ref 11.1–15.9)
HGB BLD-MCNC: 8.9 G/DL (ref 11.5–16)
IMM GRANULOCYTES # BLD AUTO: 0 X10E3/UL (ref 0–0.1)
IMM GRANULOCYTES # BLD AUTO: 0.1 K/UL (ref 0–0.04)
IMM GRANULOCYTES # BLD AUTO: 0.2 K/UL (ref 0–0.04)
IMM GRANULOCYTES NFR BLD AUTO: 0 %
IMM GRANULOCYTES NFR BLD AUTO: 1 % (ref 0–0.5)
IRON SATN MFR SERPL: 15 % (ref 20–50)
IRON SERPL-MCNC: 31 UG/DL (ref 35–150)
LYMPHOCYTES # BLD AUTO: 3.3 X10E3/UL (ref 0.7–3.1)
LYMPHOCYTES # BLD: 1.9 K/UL (ref 0.8–3.5)
LYMPHOCYTES # BLD: 2.3 K/UL (ref 0.8–3.5)
LYMPHOCYTES # BLD: 2.3 K/UL (ref 0.8–3.5)
LYMPHOCYTES # BLD: 3.5 K/UL (ref 0.8–3.5)
LYMPHOCYTES NFR BLD AUTO: 28 %
LYMPHOCYTES NFR BLD: 11 % (ref 12–49)
LYMPHOCYTES NFR BLD: 13 % (ref 12–49)
LYMPHOCYTES NFR BLD: 15 % (ref 12–49)
LYMPHOCYTES NFR BLD: 24 % (ref 12–49)
MCH RBC QN AUTO: 25.4 PG (ref 26–34)
MCH RBC QN AUTO: 27.6 PG (ref 26–34)
MCH RBC QN AUTO: 30.9 PG (ref 26–34)
MCH RBC QN AUTO: 31.1 PG (ref 26–34)
MCH RBC QN AUTO: 31.7 PG (ref 26.6–33)
MCHC RBC AUTO-ENTMCNC: 29.4 G/DL (ref 30–36.5)
MCHC RBC AUTO-ENTMCNC: 31.2 G/DL (ref 30–36.5)
MCHC RBC AUTO-ENTMCNC: 32.5 G/DL (ref 30–36.5)
MCHC RBC AUTO-ENTMCNC: 33.4 G/DL (ref 31.5–35.7)
MCHC RBC AUTO-ENTMCNC: 33.7 G/DL (ref 30–36.5)
MCV RBC AUTO: 86.3 FL (ref 80–99)
MCV RBC AUTO: 88.2 FL (ref 80–99)
MCV RBC AUTO: 92.3 FL (ref 80–99)
MCV RBC AUTO: 94.9 FL (ref 80–99)
MCV RBC AUTO: 95 FL (ref 79–97)
MONOCYTES # BLD AUTO: 0.8 X10E3/UL (ref 0.1–0.9)
MONOCYTES # BLD: 0.9 K/UL (ref 0–1)
MONOCYTES # BLD: 0.9 K/UL (ref 0–1)
MONOCYTES # BLD: 1 K/UL (ref 0–1)
MONOCYTES # BLD: 1.1 K/UL (ref 0–1)
MONOCYTES NFR BLD AUTO: 7 %
MONOCYTES NFR BLD: 5 % (ref 5–13)
MONOCYTES NFR BLD: 6 % (ref 5–13)
MONOCYTES NFR BLD: 6 % (ref 5–13)
MONOCYTES NFR BLD: 7 % (ref 5–13)
MYCOBACTERIUM SPEC QL CULT: NEGATIVE
MYCOBACTERIUM SPEC QL CULT: NEGATIVE
NEUTROPHILS # BLD AUTO: 7.3 X10E3/UL (ref 1.4–7)
NEUTROPHILS NFR BLD AUTO: 63 %
NEUTS SEG # BLD: 10.2 K/UL (ref 1.8–8)
NEUTS SEG # BLD: 11.7 K/UL (ref 1.8–8)
NEUTS SEG # BLD: 13.9 K/UL (ref 1.8–8)
NEUTS SEG # BLD: 14.1 K/UL (ref 1.8–8)
NEUTS SEG NFR BLD: 68 % (ref 32–75)
NEUTS SEG NFR BLD: 76 % (ref 32–75)
NEUTS SEG NFR BLD: 79 % (ref 32–75)
NEUTS SEG NFR BLD: 82 % (ref 32–75)
NRBC # BLD: 0 K/UL (ref 0–0.01)
NRBC BLD-RTO: 0 PER 100 WBC
P-R INTERVAL, ECG05: 114 MS
PLATELET # BLD AUTO: 433 X10E3/UL (ref 150–450)
PLATELET # BLD AUTO: 434 K/UL (ref 150–400)
PLATELET # BLD AUTO: 444 K/UL (ref 150–400)
PLATELET # BLD AUTO: 673 K/UL (ref 150–400)
PLATELET # BLD AUTO: 744 K/UL (ref 150–400)
PMV BLD AUTO: 10 FL (ref 8.9–12.9)
PMV BLD AUTO: 10.1 FL (ref 8.9–12.9)
PMV BLD AUTO: 10.3 FL (ref 8.9–12.9)
PMV BLD AUTO: 11 FL (ref 8.9–12.9)
POTASSIUM SERPL-SCNC: 3.9 MMOL/L (ref 3.5–5.1)
POTASSIUM SERPL-SCNC: 4.1 MMOL/L (ref 3.5–5.1)
POTASSIUM SERPL-SCNC: 4.7 MMOL/L (ref 3.5–5.1)
POTASSIUM SERPL-SCNC: 5 MMOL/L (ref 3.5–5.2)
PROT SERPL-MCNC: 7.4 G/DL (ref 6–8.5)
PROT SERPL-MCNC: 7.5 G/DL (ref 6.4–8.2)
PROT SERPL-MCNC: 7.8 G/DL (ref 6.4–8.2)
PROT SERPL-MCNC: 8.9 G/DL (ref 6.4–8.2)
Q-T INTERVAL, ECG07: 364 MS
QRS DURATION, ECG06: 64 MS
QTC CALCULATION (BEZET), ECG08: 398 MS
RBC # BLD AUTO: 3.23 M/UL (ref 3.8–5.2)
RBC # BLD AUTO: 3.53 M/UL (ref 3.8–5.2)
RBC # BLD AUTO: 4.02 M/UL (ref 3.8–5.2)
RBC # BLD AUTO: 4.15 M/UL (ref 3.8–5.2)
RBC # BLD AUTO: 4.2 X10E6/UL (ref 3.77–5.28)
RBC MORPH BLD: ABNORMAL
RBC MORPH BLD: ABNORMAL
SERVICE CMNT-IMP: ABNORMAL
SERVICE CMNT-IMP: NORMAL
SODIUM SERPL-SCNC: 132 MMOL/L (ref 136–145)
SODIUM SERPL-SCNC: 134 MMOL/L (ref 136–145)
SODIUM SERPL-SCNC: 137 MMOL/L (ref 136–145)
SODIUM SERPL-SCNC: 142 MMOL/L (ref 134–144)
SPECIMEN EXP DATE BLD: NORMAL
SPECIMEN PREPARATION: NORMAL
SPECIMEN PREPARATION: NORMAL
SPECIMEN SOURCE: NORMAL
SPECIMEN SOURCE: NORMAL
TIBC SERPL-MCNC: 205 UG/DL (ref 250–450)
TSH SERPL DL<=0.05 MIU/L-ACNC: 0.89 UIU/ML (ref 0.36–3.74)
TSH SERPL DL<=0.05 MIU/L-ACNC: 1.02 UIU/ML (ref 0.36–3.74)
TSH SERPL DL<=0.05 MIU/L-ACNC: 1.97 UIU/ML (ref 0.36–3.74)
VENTRICULAR RATE, ECG03: 72 BPM
WBC # BLD AUTO: 11.6 X10E3/UL (ref 3.4–10.8)
WBC # BLD AUTO: 14.9 K/UL (ref 3.6–11)
WBC # BLD AUTO: 15.5 K/UL (ref 3.6–11)
WBC # BLD AUTO: 17.2 K/UL (ref 3.6–11)
WBC # BLD AUTO: 17.7 K/UL (ref 3.6–11)

## 2021-01-01 PROCEDURE — 74011636320 HC RX REV CODE- 636/320

## 2021-01-01 PROCEDURE — 77030003029 HC SUT VCRL J&J -B: Performed by: NEUROLOGICAL SURGERY

## 2021-01-01 PROCEDURE — 88342 IMHCHEM/IMCYTCHM 1ST ANTB: CPT

## 2021-01-01 PROCEDURE — 88341 IMHCHEM/IMCYTCHM EA ADD ANTB: CPT

## 2021-01-01 PROCEDURE — 86705 HEP B CORE ANTIBODY IGM: CPT

## 2021-01-01 PROCEDURE — 85025 COMPLETE CBC W/AUTO DIFF WBC: CPT

## 2021-01-01 PROCEDURE — 74011000258 HC RX REV CODE- 258: Performed by: NEUROLOGICAL SURGERY

## 2021-01-01 PROCEDURE — 74011250636 HC RX REV CODE- 250/636: Performed by: INTERNAL MEDICINE

## 2021-01-01 PROCEDURE — 74011000636 HC RX REV CODE- 636: Performed by: NEUROLOGICAL SURGERY

## 2021-01-01 PROCEDURE — 76060000035 HC ANESTHESIA 2 TO 2.5 HR: Performed by: NEUROLOGICAL SURGERY

## 2021-01-01 PROCEDURE — 74011000250 HC RX REV CODE- 250: Performed by: NURSE ANESTHETIST, CERTIFIED REGISTERED

## 2021-01-01 PROCEDURE — 99215 OFFICE O/P EST HI 40 MIN: CPT | Performed by: INTERNAL MEDICINE

## 2021-01-01 PROCEDURE — 74011250636 HC RX REV CODE- 250/636: Performed by: NEUROLOGICAL SURGERY

## 2021-01-01 PROCEDURE — 99205 OFFICE O/P NEW HI 60 MIN: CPT | Performed by: INTERNAL MEDICINE

## 2021-01-01 PROCEDURE — 74011000250 HC RX REV CODE- 250: Performed by: NEUROLOGICAL SURGERY

## 2021-01-01 PROCEDURE — 71250 CT THORAX DX C-: CPT

## 2021-01-01 PROCEDURE — 88331 PATH CONSLTJ SURG 1 BLK 1SPC: CPT

## 2021-01-01 PROCEDURE — 00B70ZZ EXCISION OF CEREBRAL HEMISPHERE, OPEN APPROACH: ICD-10-PCS | Performed by: NEUROLOGICAL SURGERY

## 2021-01-01 PROCEDURE — 00C70ZZ EXTIRPATION OF MATTER FROM CEREBRAL HEMISPHERE, OPEN APPROACH: ICD-10-PCS | Performed by: NEUROLOGICAL SURGERY

## 2021-01-01 PROCEDURE — 80053 COMPREHEN METABOLIC PANEL: CPT

## 2021-01-01 PROCEDURE — 87102 FUNGUS ISOLATION CULTURE: CPT

## 2021-01-01 PROCEDURE — 74011250636 HC RX REV CODE- 250/636: Performed by: NURSE ANESTHETIST, CERTIFIED REGISTERED

## 2021-01-01 PROCEDURE — 76040000019: Performed by: INTERNAL MEDICINE

## 2021-01-01 PROCEDURE — 82728 ASSAY OF FERRITIN: CPT

## 2021-01-01 PROCEDURE — A9576 INJ PROHANCE MULTIPACK: HCPCS

## 2021-01-01 PROCEDURE — 70450 CT HEAD/BRAIN W/O DYE: CPT

## 2021-01-01 PROCEDURE — 74011250637 HC RX REV CODE- 250/637: Performed by: NEUROLOGICAL SURGERY

## 2021-01-01 PROCEDURE — 74011000258 HC RX REV CODE- 258: Performed by: INTERNAL MEDICINE

## 2021-01-01 PROCEDURE — 84443 ASSAY THYROID STIM HORMONE: CPT

## 2021-01-01 PROCEDURE — 76210000000 HC OR PH I REC 2 TO 2.5 HR: Performed by: NEUROLOGICAL SURGERY

## 2021-01-01 PROCEDURE — 88305 TISSUE EXAM BY PATHOLOGIST: CPT

## 2021-01-01 PROCEDURE — 99214 OFFICE O/P EST MOD 30 MIN: CPT | Performed by: NURSE PRACTITIONER

## 2021-01-01 PROCEDURE — 77030004472 HC BUR TAPR MEDT -B: Performed by: NEUROLOGICAL SURGERY

## 2021-01-01 PROCEDURE — 36415 COLL VENOUS BLD VENIPUNCTURE: CPT

## 2021-01-01 PROCEDURE — 88360 TUMOR IMMUNOHISTOCHEM/MANUAL: CPT

## 2021-01-01 PROCEDURE — 65660000000 HC RM CCU STEPDOWN

## 2021-01-01 PROCEDURE — 74011250636 HC RX REV CODE- 250/636

## 2021-01-01 PROCEDURE — 77030010507 HC ADH SKN DERMBND J&J -B: Performed by: NEUROLOGICAL SURGERY

## 2021-01-01 PROCEDURE — 74011000636 HC RX REV CODE- 636

## 2021-01-01 PROCEDURE — 77030026438 HC STYL ET INTUB CARD -A: Performed by: ANESTHESIOLOGY

## 2021-01-01 PROCEDURE — 99214 OFFICE O/P EST MOD 30 MIN: CPT | Performed by: INTERNAL MEDICINE

## 2021-01-01 PROCEDURE — 88172 CYTP DX EVAL FNA 1ST EA SITE: CPT

## 2021-01-01 PROCEDURE — 77030013797 HC KT TRNSDUC PRSSR EDWD -A

## 2021-01-01 PROCEDURE — 77030005402 HC CATH RAD ART LN KT TELE -B

## 2021-01-01 PROCEDURE — 77030008684 HC TU ET CUF COVD -B: Performed by: ANESTHESIOLOGY

## 2021-01-01 PROCEDURE — 86704 HEP B CORE ANTIBODY TOTAL: CPT

## 2021-01-01 PROCEDURE — 77030040361 HC SLV COMPR DVT MDII -B: Performed by: NEUROLOGICAL SURGERY

## 2021-01-01 PROCEDURE — 77063 BREAST TOMOSYNTHESIS BI: CPT

## 2021-01-01 PROCEDURE — 88377 M/PHMTRC ALYS ISHQUANT/SEMIQ: CPT

## 2021-01-01 PROCEDURE — 77030013137 HC MRK XR CRAN BUSA -A: Performed by: NEUROLOGICAL SURGERY

## 2021-01-01 PROCEDURE — 76010000171 HC OR TIME 2 TO 2.5 HR INTENSV-TIER 1: Performed by: NEUROLOGICAL SURGERY

## 2021-01-01 PROCEDURE — 87340 HEPATITIS B SURFACE AG IA: CPT

## 2021-01-01 PROCEDURE — 88173 CYTOPATH EVAL FNA REPORT: CPT

## 2021-01-01 PROCEDURE — C1713 ANCHOR/SCREW BN/BN,TIS/BN: HCPCS | Performed by: NEUROLOGICAL SURGERY

## 2021-01-01 PROCEDURE — 81235 EGFR GENE COM VARIANTS: CPT

## 2021-01-01 PROCEDURE — 77030002946 HC SUT NRLN J&J -B: Performed by: NEUROLOGICAL SURGERY

## 2021-01-01 PROCEDURE — 70553 MRI BRAIN STEM W/O & W/DYE: CPT

## 2021-01-01 PROCEDURE — 81210 BRAF GENE: CPT

## 2021-01-01 PROCEDURE — 77030014650 HC SEAL MTRX FLOSEL BAXT -C: Performed by: NEUROLOGICAL SURGERY

## 2021-01-01 PROCEDURE — 77030040922 HC BLNKT HYPOTHRM STRY -A

## 2021-01-01 PROCEDURE — 74011000258 HC RX REV CODE- 258: Performed by: NURSE ANESTHETIST, CERTIFIED REGISTERED

## 2021-01-01 PROCEDURE — 86706 HEP B SURFACE ANTIBODY: CPT

## 2021-01-01 PROCEDURE — 65610000006 HC RM INTENSIVE CARE

## 2021-01-01 PROCEDURE — 93005 ELECTROCARDIOGRAM TRACING: CPT

## 2021-01-01 PROCEDURE — 77030014355 HC CVR BUR H TI BIOM -C: Performed by: NEUROLOGICAL SURGERY

## 2021-01-01 PROCEDURE — 74011000250 HC RX REV CODE- 250: Performed by: NURSE PRACTITIONER

## 2021-01-01 PROCEDURE — 2709999900 HC NON-CHARGEABLE SUPPLY: Performed by: NEUROLOGICAL SURGERY

## 2021-01-01 PROCEDURE — 71260 CT THORAX DX C+: CPT

## 2021-01-01 PROCEDURE — 86901 BLOOD TYPING SEROLOGIC RH(D): CPT

## 2021-01-01 PROCEDURE — 88112 CYTOPATH CELL ENHANCE TECH: CPT

## 2021-01-01 PROCEDURE — 72156 MRI NECK SPINE W/O & W/DYE: CPT

## 2021-01-01 PROCEDURE — 2W30XYZ IMMOBILIZATION OF HEAD USING OTHER DEVICE: ICD-10-PCS | Performed by: NEUROLOGICAL SURGERY

## 2021-01-01 PROCEDURE — 74011250636 HC RX REV CODE- 250/636: Performed by: ANESTHESIOLOGY

## 2021-01-01 PROCEDURE — 96413 CHEMO IV INFUSION 1 HR: CPT

## 2021-01-01 PROCEDURE — 71045 X-RAY EXAM CHEST 1 VIEW: CPT

## 2021-01-01 PROCEDURE — A9552 F18 FDG: HCPCS

## 2021-01-01 PROCEDURE — 77030020061 HC IV BLD WRMR ADMIN SET 3M -B: Performed by: ANESTHESIOLOGY

## 2021-01-01 PROCEDURE — 87070 CULTURE OTHR SPECIMN AEROBIC: CPT

## 2021-01-01 PROCEDURE — 76040000020: Performed by: INTERNAL MEDICINE

## 2021-01-01 PROCEDURE — 77030010813: Performed by: NEUROLOGICAL SURGERY

## 2021-01-01 PROCEDURE — 88307 TISSUE EXAM BY PATHOLOGIST: CPT

## 2021-01-01 PROCEDURE — 77030014008 HC SPNG HEMSTAT J&J -C: Performed by: NEUROLOGICAL SURGERY

## 2021-01-01 PROCEDURE — 2709999900 HC NON-CHARGEABLE SUPPLY

## 2021-01-01 PROCEDURE — 87116 MYCOBACTERIA CULTURE: CPT

## 2021-01-01 PROCEDURE — 83540 ASSAY OF IRON: CPT

## 2021-01-01 PROCEDURE — 74011250636 HC RX REV CODE- 250/636: Performed by: NURSE PRACTITIONER

## 2021-01-01 PROCEDURE — 74011000258 HC RX REV CODE- 258: Performed by: NURSE PRACTITIONER

## 2021-01-01 PROCEDURE — 76060000035 HC ANESTHESIA 2 TO 2.5 HR: Performed by: INTERNAL MEDICINE

## 2021-01-01 PROCEDURE — 77030021678 HC GLIDESCP STAT DISP VERT -B: Performed by: ANESTHESIOLOGY

## 2021-01-01 PROCEDURE — 77030002933 HC SUT MCRYL J&J -A: Performed by: NEUROLOGICAL SURGERY

## 2021-01-01 PROCEDURE — 70460 CT HEAD/BRAIN W/DYE: CPT

## 2021-01-01 PROCEDURE — 77030004391 HC BUR FLUT MEDT -C: Performed by: NEUROLOGICAL SURGERY

## 2021-01-01 DEVICE — COVER BUR H SM DIA13MM THK0.5MM 5 H NEURO TI FOR 1.5MM SCR: Type: IMPLANTABLE DEVICE | Site: CRANIAL | Status: FUNCTIONAL

## 2021-01-01 DEVICE — SCREW BNE L3.5MM DIA1.5MM CORT MAXILLOMANDIBULAR GRN TI: Type: IMPLANTABLE DEVICE | Site: CRANIAL | Status: FUNCTIONAL

## 2021-01-01 DEVICE — PLATE BONE LNG L16MM THK0.6MM 2 H TI STR FOR 1.5MM SCR: Type: IMPLANTABLE DEVICE | Site: CRANIAL | Status: FUNCTIONAL

## 2021-01-01 RX ORDER — SODIUM CHLORIDE 0.9 % (FLUSH) 0.9 %
5-40 SYRINGE (ML) INJECTION AS NEEDED
Status: DISCONTINUED | OUTPATIENT
Start: 2021-01-01 | End: 2021-01-01 | Stop reason: HOSPADM

## 2021-01-01 RX ORDER — MORPHINE SULFATE 15 MG/1
15 TABLET, FILM COATED, EXTENDED RELEASE ORAL EVERY 12 HOURS
Qty: 60 TABLET | Refills: 0 | Status: SHIPPED | OUTPATIENT
Start: 2021-01-01 | End: 2021-01-01 | Stop reason: SDUPTHER

## 2021-01-01 RX ORDER — ALBUTEROL SULFATE 0.83 MG/ML
2.5 SOLUTION RESPIRATORY (INHALATION) AS NEEDED
Status: CANCELLED
Start: 2021-01-01

## 2021-01-01 RX ORDER — OXYCODONE HYDROCHLORIDE 5 MG/1
5 TABLET ORAL
Qty: 120 TABLET | Refills: 0 | Status: SHIPPED | OUTPATIENT
Start: 2021-01-01 | End: 2021-01-01 | Stop reason: SDUPTHER

## 2021-01-01 RX ORDER — ROSUVASTATIN CALCIUM 10 MG/1
20 TABLET, COATED ORAL DAILY
Status: DISCONTINUED | OUTPATIENT
Start: 2021-01-01 | End: 2021-01-01 | Stop reason: HOSPADM

## 2021-01-01 RX ORDER — PHENYLEPHRINE HCL IN 0.9% NACL 0.4MG/10ML
SYRINGE (ML) INTRAVENOUS AS NEEDED
Status: DISCONTINUED | OUTPATIENT
Start: 2021-01-01 | End: 2021-01-01 | Stop reason: HOSPADM

## 2021-01-01 RX ORDER — LEVETIRACETAM 500 MG/1
500 TABLET ORAL 2 TIMES DAILY
Qty: 60 TABLET | Refills: 1 | Status: SHIPPED | OUTPATIENT
Start: 2021-01-01 | End: 2021-01-01

## 2021-01-01 RX ORDER — ACETAMINOPHEN 325 MG/1
650 TABLET ORAL AS NEEDED
Status: CANCELLED
Start: 2021-01-01

## 2021-01-01 RX ORDER — EPINEPHRINE 1 MG/ML
0.3 INJECTION, SOLUTION, CONCENTRATE INTRAVENOUS AS NEEDED
Status: CANCELLED | OUTPATIENT
Start: 2021-01-01

## 2021-01-01 RX ORDER — HEPARIN 100 UNIT/ML
300-500 SYRINGE INTRAVENOUS AS NEEDED
Status: CANCELLED
Start: 2021-01-01

## 2021-01-01 RX ORDER — DIPHENHYDRAMINE HYDROCHLORIDE 50 MG/ML
50 INJECTION, SOLUTION INTRAMUSCULAR; INTRAVENOUS AS NEEDED
Status: CANCELLED
Start: 2021-01-01

## 2021-01-01 RX ORDER — ACETAMINOPHEN 325 MG/1
650 TABLET ORAL AS NEEDED
Status: CANCELLED
Start: 2022-01-01

## 2021-01-01 RX ORDER — ONDANSETRON 2 MG/ML
INJECTION INTRAMUSCULAR; INTRAVENOUS AS NEEDED
Status: DISCONTINUED | OUTPATIENT
Start: 2021-01-01 | End: 2021-01-01 | Stop reason: HOSPADM

## 2021-01-01 RX ORDER — SODIUM CHLORIDE 0.9 % (FLUSH) 0.9 %
5-40 SYRINGE (ML) INJECTION EVERY 8 HOURS
Status: CANCELLED | OUTPATIENT
Start: 2021-01-01

## 2021-01-01 RX ORDER — ONDANSETRON 2 MG/ML
8 INJECTION INTRAMUSCULAR; INTRAVENOUS AS NEEDED
Status: CANCELLED | OUTPATIENT
Start: 2021-01-01

## 2021-01-01 RX ORDER — SODIUM CHLORIDE 0.9 % (FLUSH) 0.9 %
5-40 SYRINGE (ML) INJECTION EVERY 8 HOURS
Status: DISCONTINUED | OUTPATIENT
Start: 2021-01-01 | End: 2021-01-01 | Stop reason: HOSPADM

## 2021-01-01 RX ORDER — ROCURONIUM BROMIDE 10 MG/ML
INJECTION, SOLUTION INTRAVENOUS AS NEEDED
Status: DISCONTINUED | OUTPATIENT
Start: 2021-01-01 | End: 2021-01-01 | Stop reason: HOSPADM

## 2021-01-01 RX ORDER — ALPRAZOLAM 1 MG/1
1 TABLET ORAL
Qty: 60 TABLET | Refills: 0 | Status: SHIPPED | OUTPATIENT
Start: 2021-01-01 | End: 2021-01-01 | Stop reason: SDUPTHER

## 2021-01-01 RX ORDER — OXYCODONE HYDROCHLORIDE 5 MG/1
5 TABLET ORAL
Qty: 60 TABLET | Refills: 0 | Status: SHIPPED | OUTPATIENT
Start: 2021-01-01 | End: 2021-01-01

## 2021-01-01 RX ORDER — ALPRAZOLAM 1 MG/1
TABLET ORAL
Qty: 60 TABLET | Refills: 0 | Status: SHIPPED | OUTPATIENT
Start: 2021-01-01 | End: 2021-01-01 | Stop reason: SDUPTHER

## 2021-01-01 RX ORDER — SODIUM CHLORIDE 9 MG/ML
25 INJECTION, SOLUTION INTRAVENOUS CONTINUOUS
Status: CANCELLED | OUTPATIENT
Start: 2021-01-01

## 2021-01-01 RX ORDER — VARENICLINE TARTRATE 1 MG/1
1 TABLET, FILM COATED ORAL
COMMUNITY
End: 2022-01-01

## 2021-01-01 RX ORDER — PROPOFOL 10 MG/ML
INJECTION, EMULSION INTRAVENOUS AS NEEDED
Status: DISCONTINUED | OUTPATIENT
Start: 2021-01-01 | End: 2021-01-01 | Stop reason: HOSPADM

## 2021-01-01 RX ORDER — DIPHENHYDRAMINE HYDROCHLORIDE 50 MG/ML
50 INJECTION, SOLUTION INTRAMUSCULAR; INTRAVENOUS AS NEEDED
Status: CANCELLED
Start: 2022-01-01

## 2021-01-01 RX ORDER — SODIUM CHLORIDE 0.9 % (FLUSH) 0.9 %
10 SYRINGE (ML) INJECTION AS NEEDED
Status: CANCELLED | OUTPATIENT
Start: 2021-01-01

## 2021-01-01 RX ORDER — FENTANYL CITRATE 50 UG/ML
INJECTION, SOLUTION INTRAMUSCULAR; INTRAVENOUS
Status: COMPLETED
Start: 2021-01-01 | End: 2021-01-01

## 2021-01-01 RX ORDER — LEVETIRACETAM 500 MG/1
500 TABLET ORAL 2 TIMES DAILY
Qty: 60 TABLET | Refills: 1 | Status: SHIPPED | OUTPATIENT
Start: 2021-01-01 | End: 2021-01-01 | Stop reason: SDUPTHER

## 2021-01-01 RX ORDER — ALPRAZOLAM 1 MG/1
1 TABLET ORAL
Qty: 60 TABLET | Refills: 0 | Status: SHIPPED | OUTPATIENT
Start: 2021-01-01 | End: 2022-01-01 | Stop reason: SDUPTHER

## 2021-01-01 RX ORDER — DIPHENHYDRAMINE HYDROCHLORIDE 50 MG/ML
25 INJECTION, SOLUTION INTRAMUSCULAR; INTRAVENOUS AS NEEDED
Status: CANCELLED
Start: 2021-01-01

## 2021-01-01 RX ORDER — LEVETIRACETAM 500 MG/1
500 TABLET ORAL 2 TIMES DAILY
Status: DISCONTINUED | OUTPATIENT
Start: 2021-01-01 | End: 2021-01-01 | Stop reason: HOSPADM

## 2021-01-01 RX ORDER — ESTRADIOL 0.5 MG/1
0.5 TABLET ORAL DAILY
COMMUNITY

## 2021-01-01 RX ORDER — MEDROXYPROGESTERONE ACETATE 10 MG/1
5 TABLET ORAL DAILY
Status: DISCONTINUED | OUTPATIENT
Start: 2021-01-01 | End: 2021-01-01 | Stop reason: HOSPADM

## 2021-01-01 RX ORDER — SUCCINYLCHOLINE CHLORIDE 20 MG/ML
INJECTION INTRAMUSCULAR; INTRAVENOUS AS NEEDED
Status: DISCONTINUED | OUTPATIENT
Start: 2021-01-01 | End: 2021-01-01 | Stop reason: HOSPADM

## 2021-01-01 RX ORDER — SODIUM CHLORIDE 9 MG/ML
10 INJECTION INTRAMUSCULAR; INTRAVENOUS; SUBCUTANEOUS AS NEEDED
Status: DISCONTINUED | OUTPATIENT
Start: 2021-01-01 | End: 2021-01-01 | Stop reason: HOSPADM

## 2021-01-01 RX ORDER — SIMETHICONE 125 MG
125 CAPSULE ORAL
COMMUNITY

## 2021-01-01 RX ORDER — SODIUM CHLORIDE, SODIUM LACTATE, POTASSIUM CHLORIDE, CALCIUM CHLORIDE 600; 310; 30; 20 MG/100ML; MG/100ML; MG/100ML; MG/100ML
INJECTION, SOLUTION INTRAVENOUS
Status: DISCONTINUED | OUTPATIENT
Start: 2021-01-01 | End: 2021-01-01 | Stop reason: HOSPADM

## 2021-01-01 RX ORDER — MORPHINE SULFATE 15 MG/1
15 TABLET, FILM COATED, EXTENDED RELEASE ORAL EVERY 12 HOURS
Qty: 60 TABLET | Refills: 0 | Status: SHIPPED | OUTPATIENT
Start: 2021-01-01 | End: 2022-01-01 | Stop reason: SDUPTHER

## 2021-01-01 RX ORDER — MIDAZOLAM HYDROCHLORIDE 1 MG/ML
1 INJECTION, SOLUTION INTRAMUSCULAR; INTRAVENOUS AS NEEDED
Status: DISCONTINUED | OUTPATIENT
Start: 2021-01-01 | End: 2021-01-01 | Stop reason: HOSPADM

## 2021-01-01 RX ORDER — HEPARIN 100 UNIT/ML
300-500 SYRINGE INTRAVENOUS AS NEEDED
Status: DISCONTINUED | OUTPATIENT
Start: 2021-01-01 | End: 2021-01-01 | Stop reason: HOSPADM

## 2021-01-01 RX ORDER — ONDANSETRON 2 MG/ML
4 INJECTION INTRAMUSCULAR; INTRAVENOUS
Status: DISCONTINUED | OUTPATIENT
Start: 2021-01-01 | End: 2021-01-01 | Stop reason: HOSPADM

## 2021-01-01 RX ORDER — ALBUTEROL SULFATE 0.83 MG/ML
2.5 SOLUTION RESPIRATORY (INHALATION) AS NEEDED
Status: CANCELLED
Start: 2022-01-01

## 2021-01-01 RX ORDER — NEOSTIGMINE METHYLSULFATE 1 MG/ML
INJECTION, SOLUTION INTRAVENOUS AS NEEDED
Status: DISCONTINUED | OUTPATIENT
Start: 2021-01-01 | End: 2021-01-01 | Stop reason: HOSPADM

## 2021-01-01 RX ORDER — ESTRADIOL 1 MG/1
0.5 TABLET ORAL DAILY
Status: DISCONTINUED | OUTPATIENT
Start: 2021-01-01 | End: 2021-01-01 | Stop reason: HOSPADM

## 2021-01-01 RX ORDER — IPRATROPIUM BROMIDE 0.5 MG/2.5ML
0.5 SOLUTION RESPIRATORY (INHALATION)
Status: DISCONTINUED | OUTPATIENT
Start: 2021-01-01 | End: 2021-01-01 | Stop reason: HOSPADM

## 2021-01-01 RX ORDER — ALBUTEROL SULFATE 90 UG/1
2 AEROSOL, METERED RESPIRATORY (INHALATION)
COMMUNITY
End: 2022-01-01 | Stop reason: SDUPTHER

## 2021-01-01 RX ORDER — SODIUM CHLORIDE AND POTASSIUM CHLORIDE .9; .15 G/100ML; G/100ML
SOLUTION INTRAVENOUS CONTINUOUS
Status: DISCONTINUED | OUTPATIENT
Start: 2021-01-01 | End: 2021-01-01

## 2021-01-01 RX ORDER — LABETALOL HYDROCHLORIDE 5 MG/ML
10 INJECTION, SOLUTION INTRAVENOUS
Status: DISCONTINUED | OUTPATIENT
Start: 2021-01-01 | End: 2021-01-01 | Stop reason: HOSPADM

## 2021-01-01 RX ORDER — HYDRALAZINE HYDROCHLORIDE 20 MG/ML
10 INJECTION INTRAMUSCULAR; INTRAVENOUS
Status: DISCONTINUED | OUTPATIENT
Start: 2021-01-01 | End: 2021-01-01 | Stop reason: HOSPADM

## 2021-01-01 RX ORDER — DEXAMETHASONE SODIUM PHOSPHATE 4 MG/ML
INJECTION, SOLUTION INTRA-ARTICULAR; INTRALESIONAL; INTRAMUSCULAR; INTRAVENOUS; SOFT TISSUE AS NEEDED
Status: DISCONTINUED | OUTPATIENT
Start: 2021-01-01 | End: 2021-01-01 | Stop reason: HOSPADM

## 2021-01-01 RX ORDER — DOCUSATE SODIUM 100 MG/1
100 CAPSULE, LIQUID FILLED ORAL 2 TIMES DAILY
Status: DISCONTINUED | OUTPATIENT
Start: 2021-01-01 | End: 2021-01-01 | Stop reason: HOSPADM

## 2021-01-01 RX ORDER — SERTRALINE HYDROCHLORIDE 50 MG/1
50 TABLET, FILM COATED ORAL DAILY
Qty: 30 TABLET | Refills: 2 | Status: SHIPPED | OUTPATIENT
Start: 2021-01-01 | End: 2022-01-01 | Stop reason: SDUPTHER

## 2021-01-01 RX ORDER — MANNITOL 20 G/100ML
INJECTION, SOLUTION INTRAVENOUS
Status: DISCONTINUED | OUTPATIENT
Start: 2021-01-01 | End: 2021-01-01 | Stop reason: HOSPADM

## 2021-01-01 RX ORDER — MORPHINE SULFATE 2 MG/ML
2 INJECTION, SOLUTION INTRAMUSCULAR; INTRAVENOUS
Status: DISCONTINUED | OUTPATIENT
Start: 2021-01-01 | End: 2021-01-01 | Stop reason: HOSPADM

## 2021-01-01 RX ORDER — POLYETHYLENE GLYCOL 3350 17 G/17G
17 POWDER, FOR SOLUTION ORAL AS NEEDED
COMMUNITY

## 2021-01-01 RX ORDER — GLYCOPYRROLATE 0.2 MG/ML
INJECTION INTRAMUSCULAR; INTRAVENOUS AS NEEDED
Status: DISCONTINUED | OUTPATIENT
Start: 2021-01-01 | End: 2021-01-01 | Stop reason: HOSPADM

## 2021-01-01 RX ORDER — BACITRACIN 500 UNIT/G
1 PACKET (EA) TOPICAL AS NEEDED
Status: DISCONTINUED | OUTPATIENT
Start: 2021-01-01 | End: 2021-01-01 | Stop reason: HOSPADM

## 2021-01-01 RX ORDER — SODIUM CHLORIDE 0.9 % (FLUSH) 0.9 %
10 SYRINGE (ML) INJECTION
Status: COMPLETED | OUTPATIENT
Start: 2021-01-01 | End: 2021-01-01

## 2021-01-01 RX ORDER — DEXAMETHASONE 4 MG/1
TABLET ORAL
Qty: 4 TABLET | Refills: 0 | Status: SHIPPED
Start: 2021-01-01 | End: 2022-01-01

## 2021-01-01 RX ORDER — EPINEPHRINE 1 MG/ML
0.3 INJECTION, SOLUTION, CONCENTRATE INTRAVENOUS AS NEEDED
Status: CANCELLED | OUTPATIENT
Start: 2022-01-01

## 2021-01-01 RX ORDER — MEDROXYPROGESTERONE ACETATE 5 MG/1
5 TABLET ORAL DAILY
COMMUNITY
End: 2022-01-01

## 2021-01-01 RX ORDER — SODIUM CHLORIDE 0.9 % (FLUSH) 0.9 %
5-40 SYRINGE (ML) INJECTION AS NEEDED
Status: CANCELLED | OUTPATIENT
Start: 2021-01-01

## 2021-01-01 RX ORDER — HYDROCORTISONE SODIUM SUCCINATE 100 MG/2ML
100 INJECTION, POWDER, FOR SOLUTION INTRAMUSCULAR; INTRAVENOUS AS NEEDED
Status: CANCELLED | OUTPATIENT
Start: 2021-01-01

## 2021-01-01 RX ORDER — DIPHENHYDRAMINE HYDROCHLORIDE 50 MG/ML
25 INJECTION, SOLUTION INTRAMUSCULAR; INTRAVENOUS AS NEEDED
Status: CANCELLED
Start: 2022-01-01

## 2021-01-01 RX ORDER — SODIUM CHLORIDE 9 MG/ML
25 INJECTION, SOLUTION INTRAVENOUS CONTINUOUS
Status: DISCONTINUED | OUTPATIENT
Start: 2021-01-01 | End: 2021-01-01 | Stop reason: HOSPADM

## 2021-01-01 RX ORDER — FENTANYL CITRATE 50 UG/ML
INJECTION, SOLUTION INTRAMUSCULAR; INTRAVENOUS AS NEEDED
Status: DISCONTINUED | OUTPATIENT
Start: 2021-01-01 | End: 2021-01-01 | Stop reason: HOSPADM

## 2021-01-01 RX ORDER — SODIUM CHLORIDE 9 MG/ML
10 INJECTION INTRAMUSCULAR; INTRAVENOUS; SUBCUTANEOUS AS NEEDED
Status: CANCELLED | OUTPATIENT
Start: 2021-01-01

## 2021-01-01 RX ORDER — DEXAMETHASONE 4 MG/1
4 TABLET ORAL 4 TIMES DAILY
Qty: 60 TABLET | Refills: 1 | Status: SHIPPED | OUTPATIENT
Start: 2021-01-01 | End: 2021-01-01

## 2021-01-01 RX ORDER — PROPOFOL 10 MG/ML
INJECTION, EMULSION INTRAVENOUS
Status: DISCONTINUED | OUTPATIENT
Start: 2021-01-01 | End: 2021-01-01 | Stop reason: HOSPADM

## 2021-01-01 RX ORDER — ONDANSETRON 2 MG/ML
8 INJECTION INTRAMUSCULAR; INTRAVENOUS AS NEEDED
Status: CANCELLED | OUTPATIENT
Start: 2022-01-01

## 2021-01-01 RX ORDER — HEPARIN 100 UNIT/ML
300-500 SYRINGE INTRAVENOUS AS NEEDED
Status: CANCELLED
Start: 2022-01-01

## 2021-01-01 RX ORDER — OXYCODONE AND ACETAMINOPHEN 10; 325 MG/1; MG/1
1 TABLET ORAL
Status: DISCONTINUED | OUTPATIENT
Start: 2021-01-01 | End: 2021-01-01 | Stop reason: HOSPADM

## 2021-01-01 RX ORDER — ALPRAZOLAM 0.5 MG/1
1 TABLET ORAL
Status: DISCONTINUED | OUTPATIENT
Start: 2021-01-01 | End: 2021-01-01 | Stop reason: HOSPADM

## 2021-01-01 RX ORDER — FENTANYL CITRATE 50 UG/ML
50 INJECTION, SOLUTION INTRAMUSCULAR; INTRAVENOUS AS NEEDED
Status: DISCONTINUED | OUTPATIENT
Start: 2021-01-01 | End: 2021-01-01 | Stop reason: HOSPADM

## 2021-01-01 RX ORDER — LIDOCAINE HYDROCHLORIDE 20 MG/ML
INJECTION, SOLUTION EPIDURAL; INFILTRATION; INTRACAUDAL; PERINEURAL AS NEEDED
Status: DISCONTINUED | OUTPATIENT
Start: 2021-01-01 | End: 2021-01-01 | Stop reason: HOSPADM

## 2021-01-01 RX ORDER — ACETAMINOPHEN 325 MG/1
650 TABLET ORAL
Qty: 10 TABLET | Refills: 0 | Status: SHIPPED
Start: 2021-01-01

## 2021-01-01 RX ORDER — SODIUM CHLORIDE 0.9 % (FLUSH) 0.9 %
10 SYRINGE (ML) INJECTION AS NEEDED
Status: CANCELLED | OUTPATIENT
Start: 2022-01-01

## 2021-01-01 RX ORDER — MIDAZOLAM HYDROCHLORIDE 1 MG/ML
INJECTION, SOLUTION INTRAMUSCULAR; INTRAVENOUS AS NEEDED
Status: DISCONTINUED | OUTPATIENT
Start: 2021-01-01 | End: 2021-01-01 | Stop reason: HOSPADM

## 2021-01-01 RX ORDER — ACETAMINOPHEN 325 MG/1
650 TABLET ORAL
Status: DISCONTINUED | OUTPATIENT
Start: 2021-01-01 | End: 2021-01-01 | Stop reason: HOSPADM

## 2021-01-01 RX ORDER — SODIUM CHLORIDE 9 MG/ML
10 INJECTION INTRAMUSCULAR; INTRAVENOUS; SUBCUTANEOUS AS NEEDED
Status: CANCELLED | OUTPATIENT
Start: 2022-01-01

## 2021-01-01 RX ORDER — HYDROCORTISONE SODIUM SUCCINATE 100 MG/2ML
100 INJECTION, POWDER, FOR SOLUTION INTRAMUSCULAR; INTRAVENOUS AS NEEDED
Status: CANCELLED | OUTPATIENT
Start: 2022-01-01

## 2021-01-01 RX ORDER — SODIUM CHLORIDE, SODIUM LACTATE, POTASSIUM CHLORIDE, CALCIUM CHLORIDE 600; 310; 30; 20 MG/100ML; MG/100ML; MG/100ML; MG/100ML
50 INJECTION, SOLUTION INTRAVENOUS CONTINUOUS
Status: DISCONTINUED | OUTPATIENT
Start: 2021-01-01 | End: 2021-01-01 | Stop reason: HOSPADM

## 2021-01-01 RX ORDER — DEXAMETHASONE 4 MG/1
4 TABLET ORAL EVERY 12 HOURS
Status: DISCONTINUED | OUTPATIENT
Start: 2021-01-01 | End: 2021-01-01 | Stop reason: HOSPADM

## 2021-01-01 RX ORDER — LABETALOL HYDROCHLORIDE 5 MG/ML
INJECTION, SOLUTION INTRAVENOUS AS NEEDED
Status: DISCONTINUED | OUTPATIENT
Start: 2021-01-01 | End: 2021-01-01 | Stop reason: HOSPADM

## 2021-01-01 RX ORDER — LIDOCAINE HYDROCHLORIDE 10 MG/ML
0.1 INJECTION, SOLUTION EPIDURAL; INFILTRATION; INTRACAUDAL; PERINEURAL AS NEEDED
Status: DISCONTINUED | OUTPATIENT
Start: 2021-01-01 | End: 2021-01-01 | Stop reason: HOSPADM

## 2021-01-01 RX ORDER — NALOXONE HYDROCHLORIDE 4 MG/.1ML
SPRAY NASAL
Qty: 1 EACH | Refills: 0 | Status: SHIPPED | OUTPATIENT
Start: 2021-01-01

## 2021-01-01 RX ORDER — HYDROMORPHONE HYDROCHLORIDE 1 MG/ML
1 INJECTION, SOLUTION INTRAMUSCULAR; INTRAVENOUS; SUBCUTANEOUS
Status: DISCONTINUED | OUTPATIENT
Start: 2021-01-01 | End: 2021-01-01 | Stop reason: HOSPADM

## 2021-01-01 RX ORDER — SENNOSIDES 8.6 MG/1
2 TABLET ORAL DAILY
COMMUNITY
End: 2022-01-01

## 2021-01-01 RX ORDER — ALPRAZOLAM 1 MG/1
1 TABLET ORAL
Qty: 30 TABLET | Refills: 0 | Status: CANCELLED | OUTPATIENT
Start: 2021-01-01

## 2021-01-01 RX ORDER — DEXAMETHASONE SODIUM PHOSPHATE 4 MG/ML
4 INJECTION, SOLUTION INTRA-ARTICULAR; INTRALESIONAL; INTRAMUSCULAR; INTRAVENOUS; SOFT TISSUE EVERY 6 HOURS
Status: DISCONTINUED | OUTPATIENT
Start: 2021-01-01 | End: 2021-01-01

## 2021-01-01 RX ORDER — HYDROMORPHONE HYDROCHLORIDE 1 MG/ML
0.2 INJECTION, SOLUTION INTRAMUSCULAR; INTRAVENOUS; SUBCUTANEOUS
Status: DISCONTINUED | OUTPATIENT
Start: 2021-01-01 | End: 2021-01-01 | Stop reason: HOSPADM

## 2021-01-01 RX ORDER — SODIUM CHLORIDE 9 MG/ML
25 INJECTION, SOLUTION INTRAVENOUS CONTINUOUS
Status: CANCELLED | OUTPATIENT
Start: 2022-01-01

## 2021-01-01 RX ORDER — OXYCODONE HYDROCHLORIDE 5 MG/1
5 TABLET ORAL
Qty: 120 TABLET | Refills: 0 | Status: SHIPPED | OUTPATIENT
Start: 2021-01-01 | End: 2022-01-01 | Stop reason: SDUPTHER

## 2021-01-01 RX ORDER — SODIUM CHLORIDE 9 MG/ML
25 INJECTION, SOLUTION INTRAVENOUS CONTINUOUS
Status: DISPENSED | OUTPATIENT
Start: 2021-01-01 | End: 2021-01-01

## 2021-01-01 RX ORDER — ONDANSETRON 2 MG/ML
4 INJECTION INTRAMUSCULAR; INTRAVENOUS AS NEEDED
Status: DISCONTINUED | OUTPATIENT
Start: 2021-01-01 | End: 2021-01-01 | Stop reason: HOSPADM

## 2021-01-01 RX ORDER — SODIUM CHLORIDE 0.9 % (FLUSH) 0.9 %
10 SYRINGE (ML) INJECTION AS NEEDED
Status: DISCONTINUED | OUTPATIENT
Start: 2021-01-01 | End: 2021-01-01 | Stop reason: HOSPADM

## 2021-01-01 RX ORDER — FENTANYL CITRATE 50 UG/ML
25 INJECTION, SOLUTION INTRAMUSCULAR; INTRAVENOUS
Status: COMPLETED | OUTPATIENT
Start: 2021-01-01 | End: 2021-01-01

## 2021-01-01 RX ORDER — SODIUM CHLORIDE AND POTASSIUM CHLORIDE .9; .15 G/100ML; G/100ML
SOLUTION INTRAVENOUS
Status: COMPLETED
Start: 2021-01-01 | End: 2021-01-01

## 2021-01-01 RX ORDER — LIDOCAINE HYDROCHLORIDE AND EPINEPHRINE 10; 10 MG/ML; UG/ML
INJECTION, SOLUTION INFILTRATION; PERINEURAL AS NEEDED
Status: DISCONTINUED | OUTPATIENT
Start: 2021-01-01 | End: 2021-01-01 | Stop reason: HOSPADM

## 2021-01-01 RX ORDER — FLUDEOXYGLUCOSE F-18 200 MCI/ML
10 INJECTION INTRAVENOUS ONCE
Status: COMPLETED | OUTPATIENT
Start: 2021-01-01 | End: 2021-01-01

## 2021-01-01 RX ORDER — LEVETIRACETAM 500 MG/1
TABLET ORAL
Qty: 60 TABLET | Refills: 1 | Status: SHIPPED | OUTPATIENT
Start: 2021-01-01

## 2021-01-01 RX ORDER — SODIUM CHLORIDE 9 MG/ML
INJECTION, SOLUTION INTRAVENOUS
Status: DISCONTINUED | OUTPATIENT
Start: 2021-01-01 | End: 2021-01-01 | Stop reason: HOSPADM

## 2021-01-01 RX ADMIN — IOPAMIDOL 100 ML: 612 INJECTION, SOLUTION INTRAVENOUS at 10:36

## 2021-01-01 RX ADMIN — Medication 10 ML: at 22:00

## 2021-01-01 RX ADMIN — ALPRAZOLAM 1 MG: 1 TABLET ORAL at 22:57

## 2021-01-01 RX ADMIN — MANNITOL: 20 INJECTION, SOLUTION INTRAVENOUS at 12:48

## 2021-01-01 RX ADMIN — HYDROMORPHONE HYDROCHLORIDE 1 MG: 1 INJECTION, SOLUTION INTRAMUSCULAR; INTRAVENOUS; SUBCUTANEOUS at 15:14

## 2021-01-01 RX ADMIN — BACITRACIN 1 PACKET: 500 OINTMENT TOPICAL at 11:07

## 2021-01-01 RX ADMIN — DEXAMETHASONE 4 MG: 4 TABLET ORAL at 21:04

## 2021-01-01 RX ADMIN — LEVETIRACETAM 500 MG: 100 INJECTION, SOLUTION INTRAVENOUS at 12:48

## 2021-01-01 RX ADMIN — DEXAMETHASONE SODIUM PHOSPHATE 4 MG: 4 INJECTION, SOLUTION INTRAMUSCULAR; INTRAVENOUS at 18:10

## 2021-01-01 RX ADMIN — ROCURONIUM BROMIDE 20 MG: 10 SOLUTION INTRAVENOUS at 13:26

## 2021-01-01 RX ADMIN — DEXAMETHASONE 4 MG: 4 TABLET ORAL at 08:07

## 2021-01-01 RX ADMIN — ROCURONIUM BROMIDE 50 MG: 10 SOLUTION INTRAVENOUS at 12:38

## 2021-01-01 RX ADMIN — SODIUM CHLORIDE 25 ML/HR: 900 INJECTION, SOLUTION INTRAVENOUS at 13:53

## 2021-01-01 RX ADMIN — LEVETIRACETAM 500 MG: 100 INJECTION, SOLUTION INTRAVENOUS at 20:35

## 2021-01-01 RX ADMIN — BACITRACIN 1 PACKET: 500 OINTMENT TOPICAL at 08:32

## 2021-01-01 RX ADMIN — POTASSIUM CHLORIDE AND SODIUM CHLORIDE: 900; 150 INJECTION, SOLUTION INTRAVENOUS at 23:23

## 2021-01-01 RX ADMIN — ROSUVASTATIN CALCIUM 20 MG: 10 TABLET, COATED ORAL at 08:07

## 2021-01-01 RX ADMIN — SODIUM CHLORIDE 400 MG: 9 INJECTION, SOLUTION INTRAVENOUS at 14:35

## 2021-01-01 RX ADMIN — HYDROMORPHONE HYDROCHLORIDE 1 MG: 1 INJECTION, SOLUTION INTRAMUSCULAR; INTRAVENOUS; SUBCUTANEOUS at 03:41

## 2021-01-01 RX ADMIN — FENTANYL CITRATE 25 MCG: 50 INJECTION, SOLUTION INTRAMUSCULAR; INTRAVENOUS at 15:28

## 2021-01-01 RX ADMIN — SODIUM CHLORIDE, POTASSIUM CHLORIDE, SODIUM LACTATE AND CALCIUM CHLORIDE: 600; 310; 30; 20 INJECTION, SOLUTION INTRAVENOUS at 09:01

## 2021-01-01 RX ADMIN — HYDROMORPHONE HYDROCHLORIDE 1 MG: 1 INJECTION, SOLUTION INTRAMUSCULAR; INTRAVENOUS; SUBCUTANEOUS at 22:57

## 2021-01-01 RX ADMIN — FENTANYL CITRATE 25 MCG: 50 INJECTION, SOLUTION INTRAMUSCULAR; INTRAVENOUS at 15:17

## 2021-01-01 RX ADMIN — ROSUVASTATIN CALCIUM 20 MG: 10 TABLET, COATED ORAL at 08:31

## 2021-01-01 RX ADMIN — GADOTERIDOL 15 ML: 279.3 INJECTION, SOLUTION INTRAVENOUS at 17:35

## 2021-01-01 RX ADMIN — LIDOCAINE HYDROCHLORIDE 70 MG: 20 INJECTION, SOLUTION EPIDURAL; INFILTRATION; INTRACAUDAL; PERINEURAL at 12:38

## 2021-01-01 RX ADMIN — Medication 10 ML: at 05:51

## 2021-01-01 RX ADMIN — FENTANYL CITRATE 50 MCG: 50 INJECTION, SOLUTION INTRAMUSCULAR; INTRAVENOUS at 10:58

## 2021-01-01 RX ADMIN — MIDAZOLAM 2 MG: 1 INJECTION INTRAMUSCULAR; INTRAVENOUS at 12:32

## 2021-01-01 RX ADMIN — LIDOCAINE HYDROCHLORIDE 100 MG: 20 INJECTION, SOLUTION EPIDURAL; INFILTRATION; INTRACAUDAL; PERINEURAL at 09:02

## 2021-01-01 RX ADMIN — Medication 20 ML: at 15:37

## 2021-01-01 RX ADMIN — DOCUSATE SODIUM 100 MG: 100 CAPSULE, LIQUID FILLED ORAL at 18:10

## 2021-01-01 RX ADMIN — ONDANSETRON HYDROCHLORIDE 4 MG: 2 INJECTION, SOLUTION INTRAMUSCULAR; INTRAVENOUS at 14:24

## 2021-01-01 RX ADMIN — Medication 80 MCG: at 09:42

## 2021-01-01 RX ADMIN — HYDROMORPHONE HYDROCHLORIDE 1 MG: 1 INJECTION, SOLUTION INTRAMUSCULAR; INTRAVENOUS; SUBCUTANEOUS at 18:10

## 2021-01-01 RX ADMIN — FENTANYL CITRATE 50 MCG: 50 INJECTION, SOLUTION INTRAMUSCULAR; INTRAVENOUS at 09:10

## 2021-01-01 RX ADMIN — DEXAMETHASONE 4 MG: 4 TABLET ORAL at 08:32

## 2021-01-01 RX ADMIN — SODIUM CHLORIDE, POTASSIUM CHLORIDE, SODIUM LACTATE AND CALCIUM CHLORIDE 50 ML/HR: 600; 310; 30; 20 INJECTION, SOLUTION INTRAVENOUS at 10:30

## 2021-01-01 RX ADMIN — ROCURONIUM BROMIDE 25 MG: 10 SOLUTION INTRAVENOUS at 09:13

## 2021-01-01 RX ADMIN — PROPOFOL 100 MG: 10 INJECTION, EMULSION INTRAVENOUS at 09:02

## 2021-01-01 RX ADMIN — SODIUM CHLORIDE: 900 INJECTION, SOLUTION INTRAVENOUS at 12:35

## 2021-01-01 RX ADMIN — MEDROXYPROGESTERONE ACETATE 5 MG: 10 TABLET ORAL at 08:06

## 2021-01-01 RX ADMIN — OXYCODONE AND ACETAMINOPHEN 1 TABLET: 10; 325 TABLET ORAL at 08:07

## 2021-01-01 RX ADMIN — Medication 10 ML: at 08:48

## 2021-01-01 RX ADMIN — OXYCODONE AND ACETAMINOPHEN 1 TABLET: 10; 325 TABLET ORAL at 17:20

## 2021-01-01 RX ADMIN — HYDROMORPHONE HYDROCHLORIDE 1 MG: 1 INJECTION, SOLUTION INTRAMUSCULAR; INTRAVENOUS; SUBCUTANEOUS at 07:28

## 2021-01-01 RX ADMIN — GADOTERIDOL 14 ML: 279.3 INJECTION, SOLUTION INTRAVENOUS at 13:17

## 2021-01-01 RX ADMIN — OXYCODONE AND ACETAMINOPHEN 1 TABLET: 10; 325 TABLET ORAL at 20:34

## 2021-01-01 RX ADMIN — GLYCOPYRROLATE 0.4 MG: 0.2 INJECTION, SOLUTION INTRAMUSCULAR; INTRAVENOUS at 10:58

## 2021-01-01 RX ADMIN — SODIUM CHLORIDE 400 MG: 9 INJECTION, SOLUTION INTRAVENOUS at 13:59

## 2021-01-01 RX ADMIN — FENTANYL CITRATE 25 MCG: 50 INJECTION, SOLUTION INTRAMUSCULAR; INTRAVENOUS at 15:41

## 2021-01-01 RX ADMIN — ROCURONIUM BROMIDE 10 MG: 10 SOLUTION INTRAVENOUS at 10:14

## 2021-01-01 RX ADMIN — DEXAMETHASONE SODIUM PHOSPHATE 4 MG: 4 INJECTION, SOLUTION INTRAMUSCULAR; INTRAVENOUS at 23:24

## 2021-01-01 RX ADMIN — ONDANSETRON HYDROCHLORIDE 4 MG: 2 INJECTION, SOLUTION INTRAMUSCULAR; INTRAVENOUS at 10:36

## 2021-01-01 RX ADMIN — FENTANYL CITRATE 50 MCG: 50 INJECTION, SOLUTION INTRAMUSCULAR; INTRAVENOUS at 09:02

## 2021-01-01 RX ADMIN — OXYCODONE AND ACETAMINOPHEN 1 TABLET: 10; 325 TABLET ORAL at 09:35

## 2021-01-01 RX ADMIN — WATER 2 G: 1 INJECTION INTRAMUSCULAR; INTRAVENOUS; SUBCUTANEOUS at 20:34

## 2021-01-01 RX ADMIN — POTASSIUM CHLORIDE AND SODIUM CHLORIDE: 900; 150 INJECTION, SOLUTION INTRAVENOUS at 15:39

## 2021-01-01 RX ADMIN — DEXAMETHASONE SODIUM PHOSPHATE 10 MG: 4 INJECTION, SOLUTION INTRAMUSCULAR; INTRAVENOUS at 13:06

## 2021-01-01 RX ADMIN — FENTANYL CITRATE 25 MCG: 50 INJECTION, SOLUTION INTRAMUSCULAR; INTRAVENOUS at 15:03

## 2021-01-01 RX ADMIN — ALPRAZOLAM 1 MG: 1 TABLET ORAL at 17:18

## 2021-01-01 RX ADMIN — LABETALOL HYDROCHLORIDE 10 MG: 5 INJECTION INTRAVENOUS at 14:45

## 2021-01-01 RX ADMIN — SODIUM CHLORIDE 25 ML/HR: 900 INJECTION, SOLUTION INTRAVENOUS at 14:32

## 2021-01-01 RX ADMIN — ROCURONIUM BROMIDE 5 MG: 10 SOLUTION INTRAVENOUS at 09:02

## 2021-01-01 RX ADMIN — GADOTERIDOL 15 ML: 279.3 INJECTION, SOLUTION INTRAVENOUS at 13:00

## 2021-01-01 RX ADMIN — OXYCODONE AND ACETAMINOPHEN 1 TABLET: 10; 325 TABLET ORAL at 00:25

## 2021-01-01 RX ADMIN — Medication 10 ML: at 13:28

## 2021-01-01 RX ADMIN — HYDROMORPHONE HYDROCHLORIDE 1 MG: 1 INJECTION, SOLUTION INTRAMUSCULAR; INTRAVENOUS; SUBCUTANEOUS at 03:17

## 2021-01-01 RX ADMIN — WATER 2 G: 1 INJECTION INTRAMUSCULAR; INTRAVENOUS; SUBCUTANEOUS at 05:51

## 2021-01-01 RX ADMIN — ESTRADIOL 0.5 MG: 1 TABLET ORAL at 08:32

## 2021-01-01 RX ADMIN — SODIUM CHLORIDE 400 MG: 9 INJECTION, SOLUTION INTRAVENOUS at 12:40

## 2021-01-01 RX ADMIN — ALPRAZOLAM 1 MG: 1 TABLET ORAL at 07:36

## 2021-01-01 RX ADMIN — PHENYLEPHRINE HYDROCHLORIDE 20 MCG/MIN: 10 INJECTION INTRAVENOUS at 12:44

## 2021-01-01 RX ADMIN — GLYCOPYRROLATE 0.4 MG: 0.2 INJECTION, SOLUTION INTRAMUSCULAR; INTRAVENOUS at 14:42

## 2021-01-01 RX ADMIN — WATER 2 G: 1 INJECTION INTRAMUSCULAR; INTRAVENOUS; SUBCUTANEOUS at 13:28

## 2021-01-01 RX ADMIN — LEVETIRACETAM 500 MG: 500 TABLET ORAL at 17:20

## 2021-01-01 RX ADMIN — PROPOFOL 150 MG: 10 INJECTION, EMULSION INTRAVENOUS at 12:38

## 2021-01-01 RX ADMIN — SODIUM CHLORIDE 25 ML/HR: 900 INJECTION, SOLUTION INTRAVENOUS at 12:37

## 2021-01-01 RX ADMIN — ESTRADIOL 0.5 MG: 1 TABLET ORAL at 08:07

## 2021-01-01 RX ADMIN — DEXAMETHASONE SODIUM PHOSPHATE 4 MG: 4 INJECTION, SOLUTION INTRAMUSCULAR; INTRAVENOUS at 09:20

## 2021-01-01 RX ADMIN — Medication 10 ML: at 18:15

## 2021-01-01 RX ADMIN — LEVETIRACETAM 500 MG: 500 TABLET ORAL at 08:07

## 2021-01-01 RX ADMIN — FLUDEOXYGLUCOSE F-18 9.98 MILLICURIE: 200 INJECTION INTRAVENOUS at 08:48

## 2021-01-01 RX ADMIN — LEVETIRACETAM 500 MG: 500 TABLET ORAL at 08:32

## 2021-01-01 RX ADMIN — Medication 80 MCG: at 09:29

## 2021-01-01 RX ADMIN — FENTANYL CITRATE 100 MCG: 50 INJECTION, SOLUTION INTRAMUSCULAR; INTRAVENOUS at 12:38

## 2021-01-01 RX ADMIN — PROPOFOL 100 MCG/KG/MIN: 10 INJECTION, EMULSION INTRAVENOUS at 12:44

## 2021-01-01 RX ADMIN — REMIFENTANIL HYDROCHLORIDE 0.2 MCG/KG/MIN: 1 INJECTION, POWDER, LYOPHILIZED, FOR SOLUTION INTRAVENOUS at 12:44

## 2021-01-01 RX ADMIN — DEXAMETHASONE SODIUM PHOSPHATE 4 MG: 4 INJECTION, SOLUTION INTRAMUSCULAR; INTRAVENOUS at 05:51

## 2021-01-01 RX ADMIN — Medication 10 ML: at 15:38

## 2021-01-01 RX ADMIN — SUCCINYLCHOLINE CHLORIDE 160 MG: 20 INJECTION, SOLUTION INTRAMUSCULAR; INTRAVENOUS at 09:01

## 2021-01-01 RX ADMIN — NEOSTIGMINE METHYLSULFATE 3 MG: 1 INJECTION, SOLUTION INTRAVENOUS at 14:42

## 2021-01-01 RX ADMIN — WATER 2 G: 1 INJECTION INTRAMUSCULAR; INTRAVENOUS; SUBCUTANEOUS at 13:06

## 2021-01-01 RX ADMIN — MIDAZOLAM HYDROCHLORIDE 2 MG: 1 INJECTION, SOLUTION INTRAMUSCULAR; INTRAVENOUS at 10:58

## 2021-01-01 RX ADMIN — NEOSTIGMINE METHYLSULFATE 3 MG: 1 INJECTION, SOLUTION INTRAVENOUS at 10:58

## 2021-01-01 RX ADMIN — MEDROXYPROGESTERONE ACETATE 5 MG: 10 TABLET ORAL at 08:32

## 2021-01-01 RX ADMIN — Medication 10 ML: at 13:52

## 2021-01-01 RX ADMIN — IOPAMIDOL 100 ML: 612 INJECTION, SOLUTION INTRAVENOUS at 13:33

## 2021-07-27 NOTE — PROGRESS NOTES
Oncology Social Work  Psychosocial Assessment    Reason for Assessment:      [] Social Work Referral [x] Initial Assessment [] New Diagnosis [] Other    Advance Care Planning:  Patient completed an AMD today. This document was scanned into [employer]. The original and several copies will be provided to the patient. Sources of Information:    [x]Patient  []Family  []Staff  []Medical Record    Mental Status:    [x]Alert  []Lethargic  []Unresponsive   [] Unable to assess   Oriented to:  [x]Person  [x]Place  [x]Time  [x]Situation      Relationship Status:  [x]Single  []  []Significant Other/Life Partner  [x]  []  []    Living Circumstances:  [x]Lives Alone  []Family/Significant Other in Household  []Roommates  []Children in the Home  []Paid Caregivers  []Assisted Living Facility/Group Home  []Skilled [address]  []Homeless  []Incarcerated  []Environmental/Care Concerns  []Other:    Employment Status:  [x]Employed Full-time []Employed Part-time []Homemaker  [] Retired [] Short-Term Disability [] [facility]  [] Unemployed     Barriers to Learning:    []Language  []Developmental  []Cognitive  []Altered Mental Status  []Visual/Hearing Impairment  []Unable to Read/Write  []Motivational  [] Challenges Understanding Medical Jargon [x]No Barriers Identified      Financial/Legal Concerns:    []Uninsured  []Limited Income/Resources  []Non-Citizen  []Food Insecurity [x]No Concerns Identified   []Other:    Episcopal/Spiritual/Existential:  Does patient have any spiritual or Caodaism beliefs? [] Yes [] No  Is the patient involved in a spiritual, francisca or Caodaism community? [] Yes [] No  Patient expressing spiritual/existential angst? [] Yes [x] No  Notes:  Patient did not express any spiritual or Caodaism preferences today.       Support System:    Identified Support Person/Group:  [x]Strong  []Fair  []Limited    Coping with Illness:   [x]  Coping Well  [] Challenges Coping with Serious Illness [] Situational Depression [] Situational Anxiety [] Anticipatory Grief  [] Recent Loss [] Caregiver [place]            Narrative:   Met with the patient during her initial office visit today. The patient is being evaluated for a lung mass. She has a PCP - Dr. Jose Vazquez, III. She has been having severe headaches and chest wall pain. She went to [facility], and requested a scan, that showed a concerning mass. Living Situation - The patient is single and lives alone. She does not use any DME to ambulate. 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 She currently works 3 days per week, and keeps her grandson 3 nights a week. Insurance -  [address] [facility]    Social Support - The patient is single. She has a sister in [place], [place]. She has a sister in [place], [place] who has end stage MS, and an 80year old mother in [place], [place], who is 360 lbs, and currently on hospice. The patient takes care of her mother, and her mother's home, several days each week. The patient has a 24year old daughter in [place] who does the rodeo, and plans to go to veterinary school. She has a 32year old daughter Martina Osborn) who lives locally and works as a respiratory therapist.  Miguel Ángel Virk has a 11year old son. The patient shared that she has a best friend, Sj Vincent, who works for [employer]. Resources provided -  Provided this 's contact information as well as information regarding the complementary services provided by the [facility], explaining that the center is currently closed due to COVID-19 restrictions. Explained that meditation, yoga, relationship counseling, and music therapy, are being offered virtually. Plan:   1. Introduced self and role of this  in the [address] [place] [address].    2.  Informed the patient of the [facility] and available resources there. 3.  Continue to meet with the patient when she returns to the clinic for ongoing assessment of the patients adjustment to her diagnosis and treatment. 4.  Ongoing psychosocial support as desired by patient. Referral:   No referrals placed at this time.     Nirav Cochran LCSW

## 2021-07-27 NOTE — LETTER
7/27/2021    Patient: Deidra Albarran   YOB: 1960   Date of Visit: 7/27/2021     Martha Mensah MD  [address] [facility] [address]  [address] [facility] [address] [id number]  Via Fax: [phone]    Dear Martha Mensah MD,      Thank you for referring Ms. Deidra Albarran to [address] [place] [address] for evaluation. My notes for this consultation are attached. If you have questions, please do not hesitate to call me. I look forward to following your patient along with you.       Sincerely,    Luther Lee, DO

## 2021-07-27 NOTE — PROGRESS NOTES
[facility] at [address], [address], [address]  W: [phone]  F: [phone]    Reason for Visit:   Talisha Coronel is a 63 y.o. female who is seen in consultation at the request of Dr. Reena Pena for evaluation of lung mass. Treatment History:   None from us    History of Present Illness:     Pt seen today for office consult for new lung mass on CT chest 21. Pt went to [facility] for HA and had some chest wall pain. CXR showed RIGHT chest masses and CT chest recommended. CT chest:   IMPRESSION  Spiculated right upper lobe mass with positive right hilar and mediastinal  adenopathy. Probable hepatic and possible left adrenal metastases. Recommend  PET/CT. No CP/SOB. recently went to [place]/ has been traveling. Still has HAs. Has RUQ abdominal pain. Good appetite. Active. No medical problems. Hx of RIGHT hip pain. 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 Was in [place] [place]. .   Has grand kids. Has two ex husbands. Palma Sanabria bro cancer H+N cancer  No fevers/ chills/ chest pain/ SOB/ nausea/ vomiting/diarrhea/ pain/fatigue          Past Medical History:   Diagnosis Date    Hypercholesterolemia       Past Surgical History:   Procedure Laterality Date    HX BREAST AUGMENTATION      HX HERNIA REPAIR        Social History     Tobacco Use    Smoking status: Former Smoker     Years: 30.00     Quit date:      Years since quittin.5    Smokeless tobacco: Never Used   Substance Use Topics    Alcohol use: Yes     Alcohol/week: 2.0 standard drinks     Types: 2 Cans of beer per week     Comment: socially      Family History   Problem Relation Age of Onset    Depression Mother     MS Sister     Cancer Brother     Heart Disease Maternal Grandfather     Cancer Paternal Grandmother      Current Outpatient Medications   Medication Sig    medroxyPROGESTERone (Provera) 5 mg tablet Take 5 mg by mouth daily.     varenicline (Chantix) 1 mg tablet Take 1 mg by mouth two (2) times daily (after meals).  estradioL (Estrace) 0.5 mg tablet Take 0.5 mg by mouth daily.  ALPRAZolam (XANAX) 1 mg tablet TAKE 1 TABLET BY MOUTH EVERY 4 HOURS AS NEEDED FOR ANXIETY    rosuvastatin (CRESTOR) 10 mg tablet TAKE 1 TABLET BY MOUTH EVERY DAY    methylPREDNISolone (MEDROL DOSEPACK) 4 mg tablet Follow dose pack instructions (Patient not taking: Reported on 7/27/2021)    PREMPRO 0.625-2.5 mg per tablet TAKE 1 TABLET BY MOUTH EVERY DAY ONCE A DAY ORALLY 28 (Patient not taking: Reported on 7/27/2021)    azithromycin (ZITHROMAX) 250 mg tablet Take by mouth, take two tablets today then one tablet daily for 4 more days. (Patient not taking: Reported on 7/27/2021)     No current facility-administered medications for this visit. No Known Allergies     A complete review of systems was obtained, negative except as described above and as reported on ROS sheet scanned into system. Physical Exam:     Visit Vitals  /65 (BP 1 Location: Right arm, BP Patient Position: Sitting)   Pulse (!) 110   Temp 97.9 °F (36.6 °C) (Oral)   Ht 5' 6\" (1.676 m)   Wt 154 lb 11.2 oz (70.2 kg)   SpO2 97%   BMI 24.97 kg/m²     ECOG PS:  1  General: No distress  Eyes: PERRLA, anicteric sclerae  HENT: Atraumatic, wearing mask  Neck: Supple  Respiratory: CTAB, normal respiratory effort  CV: Normal rate, regular rhythm, no murmurs, no peripheral edema  GI: Soft, nontender, nondistended, no masses  MS: Normal gait and station. Digits without clubbing or cyanosis. Skin: No rashes, ecchymoses, or petechiae. Normal temperature, turgor, and texture.   Psych: Alert, oriented, appropriate affect, normal judgment/insight    Results:   No results found for: WBC, HGB, HCT, PLT, MCV, ANEU, HGBPOC, HCTPOC, HGBEXT, HCTEXT, PLTEXT, HGBEXT, HCTEXT, PLTEXT  No results found for: NA, K, CL, CO2, GLU, BUN, CREA, GFRAA, GFRNA, CA, NAPOC, KPOCT, CLPOC, GLUCPOC, IBUN, CREAPOC, ICAI  No results found for: TBILI, ALT, AP, TP, ALB, GLOB    CT Results (most recent):  Results from Hospital Encounter encounter on 07/23/21    CT CHEST W CONT    Narrative  INDICATION: Lung mass    COMPARISON: None    TECHNIQUE:  Following the uneventful intravenous administration of 100 cc  Isovue-300, 5 mm axial images were obtained through the chest. Coronal and  sagittal reformats were generated. CT dose reduction was achieved through use  of a standardized protocol tailored for this examination and automatic exposure  control for dose modulation. FINDINGS:    CHEST WALL: No mass or axillary lymphadenopathy. Bilateral breast implants  THYROID: No nodule. MEDIASTINUM: Right paratracheal 1.6 x 2.8 cm node (series 3, image 24) and right  paratracheal 2.1 x 2.1 cm node (image 21). GIOVANI: Right hilar low density 3.6 x 2.9 cm node (image 26). THORACIC AORTA: No dissection or aneurysm. MAIN PULMONARY ARTERY: Normal in caliber. TRACHEA/BRONCHI: Patent. ESOPHAGUS: No wall thickening or dilatation. HEART: Normal in size. PLEURA: No effusion or pneumothorax. LUNGS: Spiculated right upper lobe mass that is a partially solid and partially  cystic and has tenderness to the pleura. This structure measures about 2.4 cm in  axial diameter (series 4, image 12). INCIDENTALLY IMAGED UPPER ABDOMEN: Right adrenal 9 mm nodule (series 3, image  60). Right hepatic segment 5 a well-defined 1.2 cm hypodensity (series 3, image  63). BONES: No destructive bone lesion. Impression  Spiculated right upper lobe mass with positive right hilar and mediastinal  adenopathy. Probable hepatic and possible left adrenal metastases. Recommend  PET/CT. 23X        Records reviewed and summarized above. Pathology report(s) reviewed above. Radiology report(s) reviewed above. Assessment/PLAN:     1)  Spiculated RUL lung mass on CT 7/21  Pt had CT done at [facility]. Shows possible liver mass/ adrenal mass. No CP/SOB. Pt has persistent HAs.    No tissue dx of cancer yet. Reviewed imaging today. Discussed possible cancer dx. Discussed doing PET/ brain MRI. Pt is good with this and ordered. No recent labs so will order this also. Discussed need for biopsy/ tissue dx. Pt prefers to see pulmonary Dr Chino Moran and will refer. Can also biopsy liver mass. Pt clinically is active and does not have any medical problems. Trying to quit smoking. Will do above and fu with us after. 2) HAs. Need brain MRI. Ordered. Also wants pain med for this. rx oxycodone prn. #60.     3) liver mass on CT. Check PET. Can biopsy this if needed. 4) adrenal mass. Check PET. 5) smoker. Wants to quit. Trying with chantix. Per PCP. 6) anxiety. Xanax prn. Per PCP. 7) pain in RIGHT lower chest wall and mid sternal area. Tylenol not helping. Will send in oxycodone prn. #60    8) psychosocial. Mood good. Anxious about health. Coping well with family support. 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 Was in [employer]. SW support today. Fu here in 1 week or after tests done. Call if questions    I appreciate the opportunity to participate in Ms. Ching Fonseca's care.     Signed By: Neeta Li, DO

## 2021-07-27 NOTE — PROGRESS NOTES
Jason Crow is a 63 y.o. female  Chief Complaint   Patient presents with    New Patient     Lung Mass     1. Have you been to the ER, urgent care clinic since your last visit? Hospitalized since your last visit? No.  2. Have you seen or consulted any other health care providers outside of the [address] [place] [address] since your last visit? Include any pap smears or colon screening.  Yes, patient went to make an appt with  for August.

## 2021-07-27 NOTE — PROGRESS NOTES
Patient had an OV today and stated that she would like to schedule her scans and then call back to get a follow up appointment.

## 2021-07-28 NOTE — PROGRESS NOTES
Attempted to reach patient off mobile phone number listed in her chart, no answer. Left a voicemail to give the office a call back.   Babar Veloz

## 2021-07-29 NOTE — PROGRESS NOTES
JACINTO Verified. Called pt on mobile phone number listed. Patient was made aware of her blood work results! Patient was very appreciative over call and expressed no question.   Lisseth Koroma

## 2021-07-30 NOTE — PROCEDURES
Pre-anesthesia assessment was performed. History and physical on the chart. Informed consent for bronchoscopy with general anesthesia was obtained from the patient. The proposed procedure and possible alternatives including the option of no procedure were discussed. The benefits of the proposed procedure and its alternatives were also discussed. The nature and probability of risks of the proposed procedure and its alternatives were discussed. After our discussion, the patient gave informed consent to undergo the procedure and wished to proceed. A time out was performed prior to the start of procedure and the procedure was verified in the presence of bronchoscopist, RN and anesthesiologist. ASA assessment documented by anesthesiologist. Patients heart rate, BP, respiratory rate and oxygen saturations were monitored during the procedure. RSI was performed and patient was intubated by anesthesiologist.    Kurt Yu RN. Inspection Bronchoscopy: After patient was adequately sedated the diagnostic bronchoscope was introduced through the ETT and advanced to the corinna. The left and right tracheobronchial tree was examined upto sub segmental level. No endobronchial mass. Thick secretions. EBUS and TBNA 4R and 10R LN: EBUS inserted and mediastinum evaluated. LYUDMILA intubated and pulled back. The 4R LN identified at [facility]. 25G x 2 passes made. ELIAS pass 1 necrosis. Pass 2 malignant cells seen. Further 6 more passes with 21G needle was taken for further testing. The EBUS advanced to  and rotated to 2 Oclock and right hilar Ln identified. 2 passes with 25G needle was performed. Necrotic material seen on ELIAS. 3 more passes with 21G needle made. Patient tolerated the procedure well. No immediate complications. Navigational Bronchoscopy: The registration performed with corinna and left secondary corinna.  The RUL anterior segment was intubated and forceps was advanced in the subsegment airway. Not able to reach the nodule with multiple attempts from anteiror and apical sub segmental branches. Procedure aborted. BAL was performed. Patient tolerated the procedure. No immediate complications. BAL RUL: The procedure was completed by a BAL RUL. Sample collected:  -EBUS 4R LN TBNA 25 G x 2 and 21G x 6.  -EBUS 10R LN TBNA 25 G x 2 and 22G x 3.  -BAL RUL for cultures / cytology. EBL: 10 ml. Complication: Mild bleeding controlled with cold saline.

## 2021-07-30 NOTE — ANESTHESIA POSTPROCEDURE EVALUATION
Post-Anesthesia Evaluation and Assessment    Patient: Vicky Sanabria MRN: [id number]  SSN: [id number]    YOB: 1960  Age: 63 y.o. Sex: female      I have evaluated the patient and they are stable and ready for discharge from the PACU. Cardiovascular Function/Vital Signs  Visit Vitals  /60   Pulse 88   Resp 20   SpO2 97%   Breastfeeding No       Patient is status post General anesthesia for Procedure(s):  BRONCHOSCOPY NAVIGATIONAL  ENDOSCOPIC BRONCHOSCOPY ULTRASOUND (EBUS)  TRANSBRONCHIAL BIOPSY. Nausea/Vomiting: None    Postoperative hydration reviewed and adequate. Pain:  Pain Scale 1: Numeric (0 - 10) (07/30/21 1133)  Pain Intensity 1: 0 (07/30/21 1133)   Managed    Neurological Status: At baseline    Mental Status, Level of Consciousness: Alert and  oriented to person, place, and time    Pulmonary Status:   O2 Device: None (Room air) (07/30/21 1133)   Adequate oxygenation and airway patent    Complications related to anesthesia: None    Post-anesthesia assessment completed. No concerns    Signed By: Holly Alex MD     July 30, 2021              Procedure(s):  BRONCHOSCOPY NAVIGATIONAL  ENDOSCOPIC BRONCHOSCOPY ULTRASOUND (EBUS)  TRANSBRONCHIAL BIOPSY. general    <BSHSIANPOST>    INITIAL Post-op Vital signs:   Vitals Value Taken Time   /57 07/30/21 1143   Temp     Pulse 85 07/30/21 1145   Resp 18 07/30/21 1145   SpO2 96 % 07/30/21 1145   Vitals shown include unvalidated device data.

## 2021-07-30 NOTE — DISCHARGE INSTRUCTIONS
BRONCHOSCOPY / [place] / EBUS DISCHARGE INSTRUCTIONS    -Sore throat -- throat lozenges, cough drops, or warm salt water gargle  -Redness at IV site- apply warm compress to area; if redness or soreness persist contact your physician's office.  -Gaseous discomfort- walking, belching will help relieve any discomfort.  -Do not operate a vehicle for 12 hours OR engage in an occupation involving machinery or appliances for rest of today.  -Do not drink alcoholic beverages for at least 12 hours.  -Avoid making any critical decisions for at least 24 hour.  -You may have some blood tinged phlegm - this should stop within 2-3 hours, call MD if the bleeding is more than 1/2 cup OR if bleeding persists after 24 hours. DIET  -You may resume your previous diet. ACTIVITY  -You may resume your normal daily activities however it is recommended that you spend the remainder of the day resting -  avoid any strenuous activity. CALL M.D. WITH ANY SIGN OF   -Increasing pain, nausea, vomiting  -Abdominal distension (swelling)  -Any swelling around your neck, upper chest, collarbone.  -New increased bleeding from mouth, nose or, rectum  -Fever (chills)  -Pain in chest area  -New or worsening shortness of breath      MEDICATION  -Take your regular medicine as prescribed. Resume these medications once able to eat/drink again.       Your Bronch Team today:                       Physician:    Dr. Buffy Robison                                                    Nurse:    Alondra Johnson., RN    Respiratory Therapist:    Ralph Bravo, RRT                                      Anesthesia:    Dr. Tom Shea, CRNA

## 2021-07-30 NOTE — H&P
PCCM:  =====  H and P reviewed. Patient was examined. No changes. Procedure discussed and patient is agreeable. Patient has not received COVID-19 vaccination. Patient is COVID-19 rapid negative.

## 2021-07-30 NOTE — PERIOP NOTES
Initial RN admission and assessment performed and documented in Endoscopy navigator. Patient evaluated by anesthesia in pre-procedure holding. All procedural vital signs, airway assessment, and level of consciousness information monitored and recorded by anesthesia staff on the anesthesia record. Report received from CRNA post procedure. Endoscope was pre-cleaned at bedside immediately following procedure by Echo Banks.

## 2021-07-30 NOTE — ANESTHESIA PREPROCEDURE EVALUATION
Relevant Problems   No relevant active problems       Anesthetic History   No history of anesthetic complications            Review of Systems / Medical History  Patient summary reviewed, nursing notes reviewed and pertinent labs reviewed    Pulmonary  Within defined limits                 Neuro/Psych   Within defined limits           Cardiovascular  Within defined limits                     GI/Hepatic/Renal  Within defined limits              Endo/Other  Within defined limits           Other Findings   Comments: Lung mass           Physical Exam    Airway  Mallampati: II  TM Distance: 4 - 6 cm  Neck ROM: normal range of motion   Mouth opening: Normal     Cardiovascular  Regular rate and rhythm,  S1 and S2 normal,  no murmur, click, rub, or gallop             Dental  No notable dental hx       Pulmonary  Breath sounds clear to auscultation               Abdominal  GI exam deferred       Other Findings            Anesthetic Plan    ASA: 2  Anesthesia type: general          Induction: Intravenous  Anesthetic plan and risks discussed with: Patient

## 2021-08-02 NOTE — PROGRESS NOTES
Patient on report as eligible for Case Management. Pt was admitted to [facility] 7/30/21 for a scheduled procedure. Pre-procedure Diagnoses   Lung mass [R91.8]   Mediastinal lymphadenopathy [R59.0]   Post-procedure Diagnoses   Lung mass [R91.8]   Mediastinal lymphadenopathy [R59.0]   Procedures   BRONCHOSCOPY [DIO35 (Custom)]       Left discreet message on voicemail with this CM contact information. Will attempt to contact again to offer [address] [place] [address] Management services.

## 2021-08-03 NOTE — PROGRESS NOTES
Patient identified as eligible for [address] [place] [address] services. Second telephone outreach attempted. Left discreet voicemail with this CM confidential contact information. Will send UTR letter via [address]

## 2021-08-04 NOTE — TELEPHONE ENCOUNTER
Dr Kira Del Real called and stated this patient just had a brain MRI and the results showed positive for a brain mass. He wanted to make Dr Leo Bill team aware.

## 2021-08-04 NOTE — TELEPHONE ENCOUNTER
Called and talked with pt today  Lung biopsy path NSCLC    Reviewed brain MRI that shows  IMPRESSION  1. 2.5 cm left temporal lobe mass with subacute hemorrhage. Considering  patient's history this is most likely metastasis. 2. Mild nonspecific white matter T2 hyperintensity may be related to mild  chronic small vessel ischemic change    Discussed possible brain treatments with neurosurgery/ gamma knife/ XRT  Will send pt to both  Please set up referrals    Pt is concerned about neurosurgery but open to all options  Still has some HA. Has percocet.      Please send PDL1/EGFR/ ROS/ ALK/BRAF on lung path    Case d/w Dr Buffy Robison today  Pt is to do PET next week  Fu with us for systemic therapy pending brain eval/ PET/ markers

## 2021-08-05 NOTE — TELEPHONE ENCOUNTER
Case d/w Dr Art Christianson with pt that she is seeing Dr Shawn Delgadillo neurosurgery on Monday    Pt needs to start decadron and keppra also  Will send in   Please let pt know to  and start these meds for the brain

## 2021-08-05 NOTE — TELEPHONE ENCOUNTER
Call to patient, ID verified X 2. Informed that meds had been called to pharmacy of record, requested pharmacy phone number to verify fill, supplied. Patient to obtain and start today. States open availability, understands being referred to both Neuro and Neuro Surgery. RN to contact MD office. Call to Dr. Karan Randolph, per  referral and documentation to be faxed to [phone] and their office will contact patient. Received call from Rebeka at Dr. Veronique Olivier office, patient to be scheduled for 8/9/21 at [phone], requested referral and documentation be faxed to [phone]. Referrals are completed. Patient aware and supplied with Dr. Franck Sullivan office number if has not heard back today.

## 2021-08-06 NOTE — TELEPHONE ENCOUNTER
Add on Path request faxed/emailed to [facility] Path Dept for PDL1/ALK/ROS/BRAF/EGFR with Pathologist to select most appropriate specimen.

## 2021-08-10 NOTE — PERIOP NOTES
Preop phone call completed. Preop instructions and preop medications reveiwed with patient. Pt instructed to Purchase 2  4 oz bottles of CHG soap and instructed in use. Pre-Operative Instructions    DO NOT EAT OR DRINK ANYTHING AFTER MIDNIGHT THE NIGHT BEFORE SURGERY. Patient given surgical site infection FAQs handout and hand hygiene tips sheet. Pre-operative instructions reviewed and patient verbalizes understanding of instructions. Patient has been given the opportunity to ask additional questions.

## 2021-08-11 PROBLEM — D49.6 BRAIN TUMOR (HCC): Status: ACTIVE | Noted: 2021-01-01

## 2021-08-11 NOTE — ANESTHESIA POSTPROCEDURE EVALUATION
Procedure(s):  BRAIN LAB GUIDED LEFT TEMPORAL CRANIOTOMY WITH RESECTION OF BRAIN TUMOR.    general    <BSHSIANPOST>    INITIAL Post-op Vital signs:   Vitals Value Taken Time   /59 08/11/21 1700   Temp 36.5 °C (97.7 °F) 08/11/21 1554   Pulse 59 08/11/21 1712   Resp 15 08/11/21 1712   SpO2 97 % 08/11/21 1712   Vitals shown include unvalidated device data.

## 2021-08-11 NOTE — ANESTHESIA PROCEDURE NOTES
Arterial Line Placement    Start time: 8/11/2021 11:30 AM  End time: 8/11/2021 11:35 AM  Performed by: Darcie Damon MD  Authorized by: Darcie Damon MD     Pre-Procedure  Preanesthetic Checklist: patient identified, risks and benefits discussed, anesthesia consent, site marked, patient being monitored, timeout performed and patient being monitored    Timeout Time: 11:35 EDT        Procedure:   Prep:  ChloraPrep  Orientation:  Right  Location:  Radial artery  Catheter size:  20 G  Number of attempts:  1    Assessment:   Post-procedure:  Line secured and sterile dressing applied  Patient Tolerance:  Patient tolerated the procedure well with no immediate complications

## 2021-08-11 NOTE — ANESTHESIA PREPROCEDURE EVALUATION
Relevant Problems   No relevant active problems       Anesthetic History   No history of anesthetic complications            Review of Systems / Medical History  Patient summary reviewed, nursing notes reviewed and pertinent labs reviewed    Pulmonary  Within defined limits                 Neuro/Psych   Within defined limits           Cardiovascular                  Exercise tolerance: >4 METS     GI/Hepatic/Renal                Endo/Other             Other Findings   Comments: Lung met to brain           Physical Exam    Airway  Mallampati: I  TM Distance: > 6 cm  Neck ROM: normal range of motion   Mouth opening: Normal     Cardiovascular    Rhythm: regular  Rate: normal         Dental  No notable dental hx       Pulmonary  Breath sounds clear to auscultation               Abdominal         Other Findings            Anesthetic Plan    ASA: 2  Anesthesia type: general    Monitoring Plan: Arterial line and CVP      Induction: Intravenous  Anesthetic plan and risks discussed with: Patient

## 2021-08-11 NOTE — PROGRESS NOTES
TRANSFER - IN REPORT:  3580    Verbal report received from Iliana Lovelace RN(name) on Maria Fernanda Aragon  being received from [employer]) for routine post - op      Report consisted of patients Situation, Background, Assessment and   Recommendations(SBAR). Information from the following report(s) SBAR, Kardex, OR Summary, Procedure Summary, Intake/Output, MAR, Recent Results, Med Rec Status, Cardiac Rhythm Sinus cynthia-NSR, Alarm Parameters  and Dual Neuro Assessment was reviewed with the receiving nurse. Opportunity for questions and clarification was provided. Assessment completed upon patients arrival to unit and care assumed.      Primary Nurse Nathaniel Almodovar, GUALBERTO and Yovani Lopez RN performed a dual skin assessment on this patient No impairment noted  Srikanth score is 18

## 2021-08-11 NOTE — ROUTINE PROCESS
Patient: Ancelmo Found MRN: [id number]  SSN: [id number]   YOB: 1960  Age: 63 y.o. Sex: female     Patient is status post Procedure(s):  BRAIN LAB GUIDED LEFT TEMPORAL CRANIOTOMY WITH RESECTION OF BRAIN TUMOR. Surgeon(s) and Role:     * Sanjay Ramos MD - Primary    Local/Dose/Irrigation:  SEE MAR            Quad Lumen 08/11/21 Right (Active)        Peripheral IV 08/11/21 Left;Posterior Hand (Active)      Arterial Line 08/11/21 Right Radial artery (Active)          Airway - Endotracheal Tube 08/11/21 Oral (Active)                   Dressing/Packing:  Incision 08/11/21 Head Left-Dressing/Treatment: Surgical glue;Gauze dressing/dressing sponge;Tape/Soft cloth adhesive tape; Tubular bandage (08/11/21 0046)    Splint/Cast:  ]

## 2021-08-11 NOTE — BRIEF OP NOTE
Brief Postoperative Note    Patient: Nichole De La Torre  YOB: 1960  MRN: [id number]    Date of Procedure: 8/11/2021     Pre-Op Diagnosis: LEFT TEMPORAL BRAIN METASTASIS    Post-Op Diagnosis: Same as preoperative diagnosis. Procedure(s):  BRAIN LAB GUIDED LEFT TEMPORAL CRANIOTOMY WITH RESECTION OF BRAIN TUMOR    Surgeon(s):  Cande Kumar MD    Surgical Assistant: Surg Asst-1: Laveda Lawn    Anesthesia: General     Estimated Blood Loss (mL): less than 50     Complications: None    Specimens:   ID Type Source Tests Collected by Time Destination   1 : left temporal brain tumor  Frozen Section Brain  Cande Kumar MD 8/11/2021 1337 Pathology   2 : left temporal brain tumor  Fresh Brain  Cande Kumar MD 8/11/2021 1404 Pathology        Implants:   Implant Name Type Inv.  Item Serial No.  Lot No. LRB No. Used Action   COVER BUR H SM [id number] THK0.5MM 5 H NEURO TI FOR 1.5MM SCR - SN/A  COVER BUR H SM [id number] THK0.5MM 5 H NEURO TI FOR 1.5MM SCR N/A PAT BIOMET MICROFIXATION_WD N/A Left 2 Implanted   PLATE BONE LNG [id number] THK0.6MM 2 H TI STR FOR 1.5MM SCR - SN/A  PLATE BONE LNG [id number] THK0.6MM 2 H TI STR FOR 1.5MM SCR N/A PAT BIOMET MICROFIXATION_WD N/A Left 2 Implanted   SCREW BNE L3.5MM DIA1.5MM [employer] MAXILLOMANDIBULAR GRN TI - SN/A  SCREW BNE L3.5MM DIA1.5MM [employer] MAXILLOMANDIBULAR GRN TI N/A PAT BIOMET MICROFIXATION_WD N/A Left 10 Implanted       Drains: * No LDAs found *    Findings: metastasis    Electronically Signed by Kojo Smith MD on 8/11/2021 at 2:28 PM

## 2021-08-11 NOTE — ANESTHESIA PROCEDURE NOTES
Central Line Placement    Start time: 8/11/2021 11:10 AM  End time: 8/11/2021 11:30 AM  Performed by: Farnaz Grover MD  Authorized by: Farnaz Grover MD     Indications: vascular access and central pressure monitoring  Preanesthetic Checklist: patient identified, risks and benefits discussed, anesthesia consent, site marked, patient being monitored and timeout performed    Timeout Time: 11:10 EDT       Pre-procedure: All elements of maximal sterile barrier technique followed?  Yes    2% Chlorhexidine for cutaneous antisepsis, Hand hygiene performed prior to catheter insertion and Ultrasound guidance    Sterile Ultrasound Technique followed?: Yes            Procedure:   Prep:  ChloraPrep  Location: internal jugular  Orientation:  Right  Patient position:  Trendelenburg  Catheter type:  Quad lumen    Catheter length:  16 cm  Number of attempts:  1  Successful placement: Yes      Assessment:   Post-procedure:  Catheter secured, sterile dressing applied and sterile dressing with CHG applied  Assessment:  Blood return through all ports, guidewire removal verified and free fluid flow  Insertion:  Uncomplicated  Patient tolerance:  Patient tolerated the procedure well with no immediate complications

## 2021-08-11 NOTE — OP NOTES
[address]   OPERATIVE REPORT    Name:  Crow Nieves  MR#:  [id number]  :  1960  ACCOUNT #:  [de-identified]  DATE OF SERVICE:  2021      PREOPERATIVE DIAGNOSIS:  Hemorrhagic left temporal brain metastasis. POSTOPERATIVE DIAGNOSIS:  Hemorrhagic left temporal brain metastasis. PROCEDURE PERFORMED:  BrainLAB-guided stereotactic craniotomy resection of left temporal hemorrhagic metastatic brain tumor, use of operating microscope and stereotaxy. SURGEON:  Bri Flores MD    ASSISTANT:  Greyson Ceja SA    ANESTHESIA:  General endotracheal anesthesia. COMPLICATIONS:  None. SPECIMENS REMOVED:  Tumor for frozen and permanent. IMPLANTS:  Biomet craniofacial plating system. ESTIMATED BLOOD LOSS:  50 mL. OPERATIVE INDICATIONS:  This is a 74-year-old female with a newly diagnosed solitary hemorrhagic brain metastasis with metastatic disease. After discussing treatment options and risks of surgery, informed consent was obtained. PROCEDURE:  The patient was taken to the operating room and placed under general endotracheal anesthesia. All necessary lines and monitors were placed, given appropriate dose of IV antibiotics. SCDs and Lorenzo were placed. She was placed supine on the operating table, shoulder roll under her left shoulder, head turned to the right, then she was fixed in [place] three-point head fixation, registered for [facility] stereotaxy which was used through the case for localization. A small strip of hair was clipped from anterior to the ear rostrally to the superior temporal line. This was prepped and draped and incised with a skin knife, carried down with Bovie electrocautery through the scalp. A self-retaining retractor was placed. The temporalis muscle and fascia were opened in a T-shaped fashion. The BrainLAB was used for localization. A circular craniotomy was performed, elevated without difficulty.   The middle meningeal branches were cauterized. FloSeal was used for epidural hemostasis. The dura was opened and tacked up inferiorly. The tumor itself did not come to the surface. I used BrainLAB as well as the preoperative imaging. The tumor itself was directly underneath and inferior to the vein of Eddi. There was a large vein of Eddi, therefore I performed a corticectomy right under the vein of Eddi and got into a subcortical tissue. As soon as I basically got through the cortical region in less than 5 mm, I encountered hematoma that came out under some pressure. The corticectomy was enlarged. I then brought in the operating microscope and evacuated the hematoma. There was hemorrhagic tumor tissue that was somewhat friable. This was removed. Frozen section was sent which came back as consistent with poorly differentiated carcinoma. I swept circumferentially with suction evacuating the tumor wall back to edematous and hemosiderin stained white matter. There was tumor directly beneath the vein of Eddi and rostrally this was the most tricky part. I did vacuum back to what appeared to be a normal gyrus and removed all the obvious tumor. Gross total resection was performed. No injury to major blood vessels. The wound was irrigated. FloSeal was used for hemostasis which was irrigated out. A small layer of Surgicel was placed in the tumor cavity. There was no further bleeding. The scope was taken out of the field. The dura was then reapproximated with interrupted 4-0 Nurolon sutures. Gelfoam was placed over the closure. The skull flap was then reaffixed with the Biomet craniofacial plating system. Retractors were removed. The temporalis muscle and fascia was closed with 2-0 Vicryl. The galea was closed with inverted 2-0 Vicryls, and the scalp was closed with running 4-0 Monocryl. Wounds were cleaned, dried, and dressed with sterile dressing.   The patient was then taken out of [facility], extubated, and taken to recovery room in stable condition.         MD VONDA [id number]  D:  08/11/2021 14:36  T:  08/11/2021 19:05  JOB #:  [id number]  CC:  MD Patricia Ventura, MD Sydnee Frye,

## 2021-08-11 NOTE — PERIOP NOTES
TRANSFER - OUT REPORT:    Verbal report given to Sandra(name) on Dora Roberto  being transferred to ICU(unit) for routine post - op       Report consisted of patients Situation, Background, Assessment and   Recommendations(SBAR). Time Pre op antibiotic given:1306  Anesthesia Stop time: 6197  Lorenzo Present on Transfer to floor:yes  Order for Lorenzo on Chart:yes  Discharge Prescriptions with Chart:no    Information from the following report(s) SBAR, OR Summary, Procedure Summary, Intake/Output, MAR and Cardiac Rhythm NSR/SB was reviewed with the receiving nurse. Opportunity for questions and clarification was provided. Is the patient on 02? NO                  Is the patient on a monitor? YES    Is the nurse transporting with the patient? YES    Surgical Waiting Area notified of patient's transfer from PACU?  NO      The following personal items collected during your admission accompanied patient upon transfer:   Dental Appliance: Dental Appliances: None  Vision:    Hearing Aid:    Jewelry:    Clothing:    Other Valuables:    Valuables sent to safe:

## 2021-08-12 NOTE — PROGRESS NOTES
Neurosurgery Progress Note  [facility]          Admit Date: 2021   LOS: 1 day        Daily Progress Note: 2021    POD:1 Day Post-Op    S/P: Procedure(s):  BRAIN LAB GUIDED LEFT TEMPORAL CRANIOTOMY WITH RESECTION OF BRAIN TUMOR    Subjective: The patient is a respiratory therapist at [employer]. In July after returning from a trip to [place], she developed right chest wall pain and headaches. She went to [facility] where a chest x-ray revealed a lung mass. This was biopsied by Dr. Jaky Dubois on 21 revealing non-small cell carcinoma. She is followed by Dr. Sadie Sandhu who ordered a PET scan and brain MRI for screening purposes. Her brain MRI revealed a left temporal brain mass. She saw Dr. Hamida Freed and after discussing the risks, benefits, and alternatives of surgery, she decided to proceed. She underwent a left temporal craniotomy with tumor resection on 21 with Dr. Hamida Freed. This morning she presents in the ICU. She is doing well. She did not get much sleep last night. Denies numbness, tingling, chest pain, leg pain, nausea, vomiting, difficulty swallowing, and dyspnea. Objective:     Vital signs  Temp (24hrs), Av.9 °F (36.6 °C), Min:97.6 °F (36.4 °C), Max:98.3 °F (36.8 °C)   701 -  1900  In: -   Out: 450 [Urine:450]  08/10 1901 -  0700  In: 0901 [P.O.:75; I.V.:3943]  Out: 3703 [Urine:2825]    Visit Vitals  /64   Pulse 67   Temp 98.1 °F (36.7 °C)   Resp 17   Ht 5' 6.25\" (1.683 m)   Wt 70.7 kg (155 lb 13.8 oz)   SpO2 95%   BMI 24.97 kg/m²      O2 Device: None (Room air)     Pain control  Pain Assessment  Pain Scale 1: Numeric (0 - 10)  Pain Intensity 1: 3  Pain Onset 1: opst op  Pain Location 1: Head  Pain Orientation 1: Anterior, Left  Pain Intervention(s) 1: Medication (see MAR)    PT/OT  Gait                 Physical Exam:  Gen:NAD. Neuro: A&Ox3. Follows commands. Speech clear. Affect normal.  PERRL. EOMI. Face symmetric. Tongue midline. CHILDERS spontaneously. Strength 5/5 in UE and LE BL. Negative drift. Gait deferred. Skin: Left scalp incision C/D/I with sutures in place. No erythema, edema, or drainage. 24 hour results:    No results found for this or any previous visit (from the past 24 hour(s)). Assessment:     Active Problems:    Brain tumor ([address] (8/11/2021)        Plan:   1. Brain metastasis   - s/p crani 08/11   - cont decadron   - cont keppra   - normalize and mobilize   - ok to transfer to [facility]. 2. Brain compression with cerebral edema   - due to #1   - plans as above  3. Metastatic non-small adenocarcinoma   - Followed by Dr. Eve Garrett. PET scan scheduled for Monday and follow-up appt on Thurs  4. Smoker   - pt working on cessation  5. Anxiety   - Ativan prn    Activity: up with assist  DVT ppx: SCDs   Dispo: home hopefully tomorrow    Plan d/w Dr. Ivan Rollins, nurse.       Jennifer Cantor NP

## 2021-08-12 NOTE — PROGRESS NOTES
1930: Bedside, Verbal and Written shift change report given to Logan Sow RN (oncoming nurse) by Josephine Valles RN (offgoing nurse). Report included the following information SBAR, Kardex, ED Summary, Procedure Summary, Intake/Output, MAR, Recent Results, Cardiac Rhythm NSR, Alarm Parameters  and Dual Neuro Assessment. 0730: Bedside, Verbal and Written shift change report given to Lori Bello RN (oncoming nurse) by Iwona Bustillos RN (offgoing nurse). Report included the following information SBAR, Kardex, ED Summary, OR Summary, Intake/Output, MAR, Recent Results, Cardiac Rhythm NSR/SB, Quality Measures and Dual Neuro Assessment.

## 2021-08-12 NOTE — PROGRESS NOTES
Bedside shift change report given to Kamini Weaver RN (oncoming nurse) by Sheryl Hercules RN (offgoing nurse). Report included the following information SBAR, Kardex, ED Summary, Procedure Summary, Intake/Output, MAR, Recent Results, Med Rec Status, Cardiac Rhythm NSR, Alarm Parameters , Quality Measures and Dual Neuro Assessment. 0845: Arterial line removed per order. 1116: Central line removed per order. 1230: Lorenzo removed per order. Pt OOB sitting in recliner. Bedside shift change report given to [address] [place] (oncoming nurse) by Kamini Weaver RN (offgoing nurse). Report included the following information SBAR, Kardex, ED Summary, Procedure Summary, Intake/Output, MAR, Recent Results, Med Rec Status, Cardiac Rhythm NSR, Alarm Parameters , Quality Measures and Dual Neuro Assessment.

## 2021-08-13 NOTE — DISCHARGE SUMMARY
Discharge Summary     Patient ID:  Ravi Walton  [id number]   10 y.o.  1960    Admit date: 8/11/2021    Discharge Date: 8/13/2021      Admitting Physician: Lily Schmid MD     Discharge Physician: Victor Hugo Gatica NP    Admission Diagnoses: Brain tumor [facility]) [D49.6]    Last Procedure: Procedure(s):  BRAIN LAB GUIDED LEFT TEMPORAL CRANIOTOMY WITH RESECTION OF BRAIN TUMOR    Discharge Diagnoses: Active Problems:    Brain tumor [facility]) (8/11/2021)         Consults: None    Significant Diagnostic Studies: None    Patient condition upon discharge: Stable    Hospital Course: The patient is a respiratory therapist at [employer]. In July after returning from a trip to [facility], she developed right chest wall pain and headaches. She went to [facility] where a chest x-ray revealed a lung mass. This was biopsied by Dr. Hallie Rosas on 07/30/21 revealing non-small cell carcinoma. She is followed by Dr. Jesús Lopez who ordered a PET scan and brain MRI for screening purposes. Her brain MRI revealed a left temporal brain mass. She saw Dr. Addie Ashby and after discussing the risks, benefits, and alternatives of surgery, she decided to proceed. She underwent a left temporal craniotomy with tumor resection on 08/11/21 with Dr. Addie Ashby. Intraoperative findings can be found in his operative report. Pathology from surgery is still pending. Post-operatively, she transferred to the ICU. She did well. She was afebrile and vital signs stable. She was taking PO well, voiding on her own, and ambulating without assistance. She had transfer orders to [facility] on POD1, but ended up boarding in the ICU due to no available stepdown beds. She was ready for discharge on POD2. She will follow-up with Dr. Addie Ashby in 2 weeks. She will follow-up with Dr. Irma Fermin for adjuvant radiosurgery in the next 2 weeks as well.     Disposition: Home    Patient Instructions:   Current Discharge Medication List      START taking these medications    Details acetaminophen (TYLENOL) 325 mg tablet Take 2 Tablets by mouth every six (6) hours as needed for Pain. Qty: 10 Tablet, Refills: 0         CONTINUE these medications which have CHANGED    Details   dexAMETHasone (DECADRON) 4 mg tablet Take 1 tab PO every 12 hours tonight then 1/2 a tab PO every 12 hours x 2 days then 1/2 a tab daily x 2 days then stop. Qty: 4 Tablet, Refills: 0         CONTINUE these medications which have NOT CHANGED    Details   tiotropium (Spiriva with HandiHaler) 18 mcg inhalation capsule Take 1 Capsule by inhalation daily. levETIRAcetam (KEPPRA) 500 mg tablet Take 1 Tablet by mouth two (2) times a day. Qty: 60 Tablet, Refills: 1      medroxyPROGESTERone (Provera) 5 mg tablet Take 5 mg by mouth daily. estradioL (Estrace) 0.5 mg tablet Take 0.5 mg by mouth daily. oxyCODONE IR (ROXICODONE) 5 mg immediate release tablet Take 1 Tablet by mouth every four (4) hours as needed for Pain for up to 30 days. Max Daily Amount: 30 mg. Indications: pain  Qty: 60 Tablet, Refills: 0    Associated Diagnoses: Lung mass; Nonintractable headache, unspecified chronicity pattern, unspecified headache type; Smoker; Liver mass; Anxiety about health; Adrenal mass, left ([address] Right hip pain; Stress fracture of foot, unspecified laterality, sequela; Anxiety      ALPRAZolam (XANAX) 1 mg tablet TAKE 1 TABLET BY MOUTH EVERY 4 HOURS AS NEEDED FOR ANXIETY  Refills: 0      rosuvastatin (CRESTOR) 10 mg tablet TAKE 1 TABLET BY MOUTH EVERY DAY  Refills: 99      varenicline (Chantix) 1 mg tablet Take 1 mg by mouth two (2) times daily (after meals). Diet: Reference my discharge instructions. Activity: Reference my discharge instructions. EXAM:  Gen:NAD. Neuro: A&Ox3. Follows commands. Speech clear. Affect normal.  PERRL. EOMI. Face symmetric. Tongue midline. CHILDERS spontaneously. Strength 5/5 in UE and LE BL. Negative drift. Gait deferred. Skin: Left scalp incision C/D/I with sutures in place. No erythema, edema, or drainage. Total time discharging patient took greater than 30 minutes.     Signed:  Queta Elias NP  August 13, 2021  [id number]

## 2021-08-13 NOTE — PROGRESS NOTES
1930: Bedside and Verbal shift change report given to ADOLPH Gage, GUALBERTO (oncoming nurse) by Nohemy Price (offgoing nurse). Report included the following information SBAR, Kardex, Intake/Output, MAR, Accordion, Recent Results, Cardiac Rhythm NSR, Alarm Parameters  and Dual Neuro Assessment. 0730: Bedside and Verbal shift change report given to MAR Price (oncoming nurse) by Lor Gage RN (offgoing nurse). Report included the following information SBAR, Kardex, Intake/Output, MAR, Accordion, Recent Results, Cardiac Rhythm NSR with occasional PAC's, Alarm Parameters  and Dual Neuro Assessment.

## 2021-08-13 NOTE — DISCHARGE INSTRUCTIONS
After Hospital Care Plan:  Discharge Instructions Craniot[facility]    Patient Name: Yonathan Tatum    Date of procedure: 8/11/2021  Date of discharge: 8/13/2021    Procedure: Procedure(s):  BRAIN LAB GUIDED LEFT TEMPORAL CRANIOTOMY WITH RESECTION OF BRAIN TUMOR  PCP: None    Follow up appointments  Follow up with your neurosurgeon in 10-14 days. Call [phone] to make an appointment as soon as you get home from the hospital.  Follow-up care is a key part of your treatment and safety. Be sure to make and go to all appointments, and call your doctor if you are having problems. It's also a good idea to know your test results and keep a list of the medicines you take. A craniotomy is surgery to open your skull to fix a problem in your brain. It can be done for many reasons. For example, you may need a craniotomy if your brain or blood vessels are damaged or if you have a tumor or an infection in your brain. You will probably feel very tired for several weeks after surgery. You may also have headaches or problems concentrating. It can take 4 to 8 weeks to recover from surgery. Your cuts (incisions) may be sore for about 5 days after surgery. You may also have numbness and shooting pains near your wound, or swelling and bruising around your eyes. As your wound starts to heal, it may begin to itch. Medicines and ice packs can help with headaches, pain, swelling, and itching. The stitches that hold your incisions together may go away on their own or will be removed in 7 to 10 days. This depends on the type of stitches the doctor uses. Staples are usually removed in 10-14 days. It is common for your scalp to swell with fluid. After the swelling goes down, you may have a dent in your head. Some kinds of plates stay attached to hold the skull flap to your head. If your head was shaved, you may wear clean hats or scarves on your head until your hair grows back.      This care sheet gives you a general idea about how long it will take for you to recover. But each person recovers at a different pace. Follow the steps below to get better as quickly as possible. When to call your Neurosurgeon:   You have trouble thinking clearly.  You are sleeping more than you are awake.  You have a fever with a stiff neck or a severe headache.  You have any sudden vision changes.  Nausea or vomiting, severe headache.  Loss of bowel or bladder function, inability to urinate.  Increased weakness-greater than before your surgery.  Severe pain or pain not relieved by medications.  Signs of a blood clot in your leg-calf pain, tenderness, redness, swelling of lower leg.  Signs of infection-if your incision is red; continues to have drainage; drainage has a foul odor or if you have a persistent fever over 101 degrees for 24 hours.  You fall and hit your head. When to call your Primary Care Physician:   Concerns about medical conditions such as diabetes, high blood pressure, asthma, congestive heart failure.  Call if blood sugars are elevated, persistent headache or dizziness, coughing or congestion, constipation or diarrhea, burning with urination, abnormal heart rate. When to call 911 and go to the nearest emergency room:   Acute onset of chest pain, shortness of breath, difficulty breathing   You passed out (lost consciousness).  It is hard to think, move, speak, or see.  Your body is jerking or shaking. Activity   Rest when you feel tired. It is normal to want to sleep during the day. It is a good idea to plan to take a nap every day. Getting enough sleep will help you recover.  Try not to lie flat when you rest or sleep. You can use a wedge pillow, or you can put a rolled towel or foam padding under your pillow. You can also raise the head of your bed by putting bricks or wooden blocks under the bed legs.  After lying down, bring your head up slowly.  This can prevent headaches or dizziness.  Try to walk each day. Start by walking a little more than you did the day before. Bit by bit, increase the amount you walk. Walking boosts blood flow and helps prevent pneumonia and constipation.  Avoid heavy lifting until your doctor says it is okay.  Do not drive for 2 to 3 weeks or until your doctor says it is okay. When you begin driving again, start with short, familiar routes in the daytime.  Ask your doctor if it is safe for you to travel by plane.  Avoid risky activities, such as climbing a ladder, for 3 months after surgery.  Avoid strenuous activities, such as bicycle riding, jogging, weight lifting, or aerobic exercise, for 3 months or until your doctor says it is okay.  Do not play any rough or contact sports for 3 months or until your doctor says it is okay.  You may engage in sexual activity. Diet   Resume usual diet; drink plenty of fluids; eat foods high in fiber   It is important to have regular bowel movements. Pain medications may cause constipation. You may want to take a stool softener (such as Senokot-S or Colace) to prevent constipation.  If constipation occurs, take a laxative (such as Dulcolax tablets, Milk of Magnesia, or a suppository). Laxatives should only be used if the above preventable measures have failed and you still have not had a bowel movement after three days   Try to avoid constipation and straining with bowel movements. Incision Care      You can wash your hair 7 days after your surgery. But do not soak your head or swim for 2 to 3 weeks.  Do not dye or color your hair for 4 weeks after your surgery.  Do not rub or apply any lotions or ointments to your incision site.  Do not scrub your wound    Medicines   If your doctor or nurse practitioner prescribed antibiotics, take them as directed. Do not stop taking them just because you feel better. You need to take the full course of antibiotics.    If you get medicines to prevent seizures, take them exactly as directed.  If the doctor or nurse practitioner gave you a prescription medicine for pain, take it as prescribed.  If you are not taking a prescription pain medicine, ask your doctor or nurse practitioner if you can take an over-the-counter medicine.    Pain Medication Safety  DO:   Read the Medication Guide    Take your medicine exactly as prescribed    Store your medicine away from children and in a safe place    Call your healthcare provider for medical advice about side effects. You may report side effects to FDA at 8-060-RTB-2210.  Please be aware that many medications contain Tylenol. We do not want you to over medicate so please read the information below as a guide. Do not take more than 4 Grams of Tylenol in a 24 hour period if you are under age 79 or 3 Grams in 24 hours if you are over 79years old. (There are 1000 milligrams in one Gram)  o Percocet contains 325 mg of Tylenol per tablet (do not take more than 12 tablets in 24 hours)  o Lortab contains 500 mg of Tylenol per tablet (do not take more than 8 tablets in 24 hours)  o Norco contains 325 mg of Tylenol per tablet (do not take more than 12 tablets in 24 hours). DO NOT:   Do not give your medicine to others.  Do not take medicine unless it was prescribed for you.  Do not stop taking your medicine without talking to your healthcare provider.  Do not break, chew, crush, dissolve, or inject your medicine. If you cannot swallow your medicine whole, talk to your healthcare provider.  Do not drink alcohol while taking this medicine.  Do not take anti-inflammatory medications or aspirin unless instructed by your physician.

## 2021-08-13 NOTE — PROGRESS NOTES
Bedside shift change report given to Anastacio Munguia RN (oncoming nurse) by Joseline James RN (offgoing nurse). Report included the following information SBAR, Kardex, ED Summary, Procedure Summary, Intake/Output, MAR, Recent Results, Med Rec Status, Cardiac Rhythm NSR, Alarm Parameters , Quality Measures and Dual Neuro Assessment. I have reviewed discharge instructions with the patient. The patient verbalized understanding. I went over all follow up appointments, follow up care and prescriptions. Pt electronically signed discharge summary.

## 2021-08-16 NOTE — PROGRESS NOTES
D/w Dr William Motley neurosurgery  Pt is post neurosurgery on 8/11  PATH:  . Denis Parksy, left temporal tumor, biopsy:        Metastatic poorly differentiated carcinoma   See comment     2.  Brain, left temporal tumor, excision:        Metastatic poorly differentiated carcinoma   See comment    Discussed path with pathology and path could be lung but breast cancer not ruled out. Will need mammo/ breast exam at next visit.

## 2021-08-19 PROBLEM — C79.9 METASTATIC CARCINOMA (HCC): Status: ACTIVE | Noted: 2021-01-01

## 2021-08-19 NOTE — PROGRESS NOTES
[address]  Social Work Navigator Encounter     Patient Name:  Chaya Nguyễn    Medical History: Metastatic cancer likely lung primary. Stage IV with brain metastases.       Advance Directives: Patient has an AMD on file. Narrative:   Offered active listening and emotional support to the patient during her office visit today. Patient is status post BrainLAB-guided stereotactic craniotomy resection of left temporal hemorrhagic metastatic brain tumor, use of operating microscope and stereotaxy on 8/11/2021. The patient shared that she has clear goals of care, and is doing well emotionally. She shared that her family members and friends are struggling, and contacting her non-stop, which has been challenging. She is on short term disability through work, and plans to apply for [employer]. Provided information on when, where, and how to apply for disability. Provided a checklist from the [employer] as well as the [employer] on Disability. Offered continued support to the patient as needed. Barriers to Care:   No barriers to care identified at this time. Plan:  1. Continue to meet with the patient when she returns to the clinic for ongoing assessment of the patients adjustment to her diagnosis and treatment. 2.  Ongoing psychosocial support as desired by patient. Referral:   No referrals placed at this time.    Nirav Cochran LCSW

## 2021-08-19 NOTE — PROGRESS NOTES
[facility] [place] [address] [address], [address]  W: [phone]  F: [phone]    Reason for Visit:   Mraia Fernanda Aragon is a 63 y.o. female who is seen for fu of metastatic cancer likely lung primary    Treatment History:   8/11/21 PROCEDURE PERFORMED:  BrainLAB-guided stereotactic craniotomy resection of left temporal hemorrhagic metastatic brain tumor, use of operating microscope and stereotaxy    STAGE: 4 with brain mets    History of Present Illness:     Pt seen today for office for fu of new metastatic PD carcinoma by brain path post mass resection. Post neurosurgery 8/11/21. PATH:   1.  Brain, left temporal tumor, biopsy:        Metastatic poorly differentiated carcinoma   See comment   2.  Brain, left temporal tumor, excision:        Metastatic poorly differentiated carcinoma   See comment   Comment   Immunohistochemical stains reveal reactivity for CK7 and NATHANIEL-3, with   patchy/focal p40 staining. ER, NV, HER2, TTF-1, MART-1, SOX-10, CK20, and   CDX-2 stains are negative in the malignant cells.     The patient's recent lung/mediastinal lymph node biopsies are reviewed and   the current tumor shares some morphologic characteristics with the   malignant cells in the 4R lymph node cell block ([id number]), however   evaluation is limited by extensive necrosis. A subset of pulmonary   adenocarcinomas show NATHANIEL-3 expression (approximately 12%), therefore   metastatic carcinoma from a lung primary is still a possibility, however   NATHANIEL-3 expression is much more commonly seen in breast and urothelial   primaries, which should be reasonably excluded clinically. Note is made that the patient's recent lung biopsy was sent for molecular   testing which is pending, therefore additional testing is not requested on   this specimen. If needed, tumor material is available in block 2A of the   current specimen for ancillary testing.    These findings were discussed with  Belacorbin Doll via [employer]   at 2:30 p.m. on 8/13/2021. Pt is here today to discuss treatment plan. Prior molecular testing negative except for :  PD-L1 22C3 FDA for NSCLC: HIGH EXPRESSION   Tumor Proportion Score: 90%     Pt is not doing chemo. Not sure she wants any treatment. Wants disability. Does not want to get COVID vaccine. States TYPE 0 blood does not get COVID. Here today for discussion of treatment. Has some HA still. But prior HA is gone. No fevers/ chills/ chest pain/ SOB/ nausea/ vomiting/diarrhea/ pain/fatigue            Last visit: consult for new lung mass on CT chest 7/23/21. Pt went to [facility] for HA and had some chest wall pain. CXR showed RIGHT chest masses and CT chest recommended. CT chest:   IMPRESSION  Spiculated right upper lobe mass with positive right hilar and mediastinal  adenopathy. Probable hepatic and possible left adrenal metastases. Recommend  PET/CT. No CP/SOB. recently went to [place]/ has been traveling. Still has HAs. Has RUQ abdominal pain. Good appetite. Active. No medical problems. Hx of RIGHT hip pain. 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 .   Has grand kids. Has two ex husbands. Salome Cold   FamHx bro cancer H+N cancer  No fevers/ chills/ chest pain/ SOB/ nausea/ vomiting/diarrhea/ pain/fatigue          Past Medical History:   Diagnosis Date    Cancer ([facility] [address] 2021    LUNGS AND BRAIN AND LYMPH NODES    Hypercholesterolemia       Past Surgical History:   Procedure Laterality Date    HX BREAST AUGMENTATION      HX HERNIA REPAIR        Social History     Tobacco Use    Smoking status: Current Every Day Smoker     Packs/day: 0.75     Years: 40.00     Pack years: 30.00    Smokeless tobacco: Never Used   Substance Use Topics    Alcohol use:  Yes     Alcohol/week: 3.0 standard drinks     Types: 3 Cans of beer per week     Comment: socially      Family History   Problem Relation Age of Onset    Depression Mother  Obesity Mother     Heart Disease Mother     MS Sister         END STAGE    Cancer Brother         TONSIL, WITH METS TO LIVER AND BONES    Heart Disease Maternal Grandfather     Cancer Paternal Grandmother     Anesth Problems Neg Hx      Current Outpatient Medications   Medication Sig    acetaminophen (TYLENOL) 325 mg tablet Take 2 Tablets by mouth every six (6) hours as needed for Pain.  tiotropium (Spiriva with HandiHaler) 18 mcg inhalation capsule Take 1 Capsule by inhalation daily.  levETIRAcetam (KEPPRA) 500 mg tablet Take 1 Tablet by mouth two (2) times a day.  medroxyPROGESTERone (Provera) 5 mg tablet Take 5 mg by mouth daily.  varenicline (Chantix) 1 mg tablet Take 1 mg by mouth two (2) times daily (after meals).  estradioL (Estrace) 0.5 mg tablet Take 0.5 mg by mouth daily.  oxyCODONE IR (ROXICODONE) 5 mg immediate release tablet Take 1 Tablet by mouth every four (4) hours as needed for Pain for up to 30 days. Max Daily Amount: 30 mg. Indications: pain    ALPRAZolam (XANAX) 1 mg tablet TAKE 1 TABLET BY MOUTH EVERY 4 HOURS AS NEEDED FOR ANXIETY    rosuvastatin (CRESTOR) 10 mg tablet TAKE 1 TABLET BY MOUTH EVERY DAY    dexAMETHasone (DECADRON) 4 mg tablet Take 1 tab PO every 12 hours tonight then 1/2 a tab PO every 12 hours x 2 days then 1/2 a tab daily x 2 days then stop. (Patient not taking: Reported on 8/19/2021)     No current facility-administered medications for this visit. No Known Allergies     A complete review of systems was obtained, negative except as described above and as reported on ROS sheet scanned into system.        Physical Exam:     Visit Vitals  /70 (BP 1 Location: Left upper arm, BP Patient Position: Sitting)   Pulse 95   Temp 97.8 °F (36.6 °C) (Oral)   Ht 5' 6\" (1.676 m)   Wt 157 lb 3.2 oz (71.3 kg)   SpO2 98%   BMI 25.37 kg/m²     ECOG PS:  1  General: No distress  Eyes: PERRLA, anicteric sclerae  HENT: Atraumatic, wearing mask  Neck: Supple  Respiratory: CTAB, normal respiratory effort  CV: Normal rate, regular rhythm, no murmurs, no peripheral edema  GI: Soft, nontender, nondistended, no masses  MS: Normal gait and station. Digits without clubbing or cyanosis. Skin: No rashes, ecchymoses, or petechiae. Normal temperature, turgor, and texture. Psych: Alert, oriented, appropriate affect, normal judgment/insight  Breast no masses b/l, implants b/l     Results:     Lab Results   Component Value Date/Time    WBC 17.2 (H) 08/10/2021 09:52 AM    HGB 12.9 08/10/2021 09:52 AM    HCT 38.3 08/10/2021 09:52 AM    PLATELET 702 (H) 39/75/5996 09:52 AM    MCV 92.3 08/10/2021 09:52 AM    ABS. NEUTROPHILS 14.1 (H) 08/10/2021 09:52 AM     Lab Results   Component Value Date/Time    Sodium 142 07/27/2021 02:37 PM    Potassium 5.0 07/27/2021 02:37 PM    Chloride 105 07/27/2021 02:37 PM    CO2 23 07/27/2021 02:37 PM    Glucose 94 07/27/2021 02:37 PM    BUN 11 07/27/2021 02:37 PM    Creatinine 0.82 07/27/2021 02:37 PM    GFR est AA 91 07/27/2021 02:37 PM    GFR est non-AA 79 07/27/2021 02:37 PM    Calcium 10.2 07/27/2021 02:37 PM    Glucose (POC) 109 08/16/2021 08:39 AM     Lab Results   Component Value Date/Time    Bilirubin, total <0.2 07/27/2021 02:37 PM    ALT (SGPT) 15 07/27/2021 02:37 PM    Alk. phosphatase 87 07/27/2021 02:37 PM    Protein, total 7.4 07/27/2021 02:37 PM    Albumin 4.5 07/27/2021 02:37 PM       CT Results (most recent):  Results from [place] encounter on 08/11/21    CT HEAD WO CONT    Narrative  EXAMINATION:  CT HEAD WO CONT    CLINICAL INFORMATION:  postop  COMPARISON:  CT of 8/11/2021, MRI of 8/3/2021  TECHNIQUE: Routine axial head CT was performed. IV contrast was not  administered. Sagittal and coronal reconstructions were generated. CT dose reduction was achieved through use of a standardized protocol tailored  for this examination and automatic exposure control for dose modulation.     FINDINGS:  Recent left temporoparietal craniotomy with resection of the previously  demonstrated 2.5 cm left temporal lobe mass. Limited gas and high density noted  at the surgical site. Mild residual vasogenic edema. Left frontal  pneumocephalus. No postoperative complication. VENTRICULAR SYSTEM:  Normal for age. BASAL CISTERNS:  Patent. BRAIN PARENCHYMA:  No new findings. MIDLINE SHIFT:  None. CALVARIUM/ SKULL BASE: No destructive lesions. Left frontoparietal craniotomy. PARANASAL SINUSES AND MASTOID AIR CELLS: Clear. VISUALIZED ORBITS: No significant abnormalities. SELLA: No enlargement. Impression  Recent left temporoparietal craniotomy with tumor resection. No  operative complications. MRI Results (most recent):  Results from [place] encounter on 08/03/21    MRI BRAIN W WO CONT    Narrative  EXAM:  MRI BRAIN W WO CONT  INDICATION:  Lung mass, headache, liver mass,  TECHNIQUE:  Sagittal T1, axial FLAIR, T2, T1 and gradient echo T2-weighted images of the  head were obtained followed by intravenous infusion 15 mL ProHance repeat axial  and coronal T1-weighted images and axial diffusion weighted images. COMPARISON: None available. FINDINGS:  In the left temporal lobe is a mass which is primarily a subacute hemorrhage  demonstrated by T1 hyperintensity and a surrounding partial \"capsule\". There is  mild edema along the medial and posterior superior aspect of the mass. Small  enhancing rim anteriorly and superiorly. This measures approximately 2.1 x 2.5 x  2.2 cm. Considering patient's history of lung mass, this is most likely a brain  metastasis. No additional brain masses/lesions are demonstrated. There are multiple foci of T2 hyperintensity in the cerebral white matter  without associated abnormal enhancement or restricted diffusion in a pattern  which may be related to chronic small vessel type ischemic change.   No evidence of acute infarction, abnormal extra-axial fluid collection, or  additional area of hemorrhage. Ventricular size and configuration are within normal limits. Craniocervical  junction is unremarkable. Structures of skull base including paranasal sinuses  are unremarkable. Flow voids are present in vertebral basilar and carotid artery  systems. Impression  1. 2.5 cm left temporal lobe mass with subacute hemorrhage. Considering  patient's history this is most likely metastasis. 2. Mild nonspecific white matter T2 hyperintensity may be related to mild  chronic small vessel ischemic change. 23X        PET Results (most recent):  Results from Hospital Encounter encounter on 08/16/21    PET/CT TUMOR IMAGE SKULL THIGH W (INI)    Narrative  PET/CT SCAN    PROCEDURE: Following IV injection of 9.98 mCi 18 Fluoro 2 deoxyglucose (FDG) and  a standard uptake delay, PET imaging is performed from the skull vertex to mid  thigh and axial, sagittal and coronal images were acquired. Unenhanced CT is  obtained for anatomic localization, and attenuation correction of the PET scan. Patient preprocedure blood glucose level: 109mg/dL. CORRELATIVE IMAGING STUDIES: Chest CT 7/30/2021. COMPARISON PET: None    HISTORY: The study is requested for staging metastatic poorly differentiated  carcinoma. Biopsy confirmed diagnosis. FINDINGS:    HEAD/NECK: Photopenia corresponds to the left frontal postoperative changes. CHEST: Increased tracer activity corresponds to the lesion in the right upper  lobe, maximum SUV is 7.3. Additionally, there is hypermetabolic right  paratracheal, precarinal, and right hilar lymphadenopathy. ABDOMEN/PELVIS: Hypermetabolic right adrenal mass, maximum SUV is 9.3. There is  no abnormal uptake to correspond to the small hypodense lesion in the liver. SKELETON: No foci of abnormal hypermetabolism in the axial and visualized  appendicular skeleton. Impression  Hypermetabolic mass lesion right upper lobe with hypermetabolic  mediastinal and right hilar lymphadenopathy. Hypermetabolic right adrenal mass  is concerning for metastatic disease. Records reviewed and summarized above. Pathology report(s) reviewed above. Radiology report(s) reviewed above. Assessment/PLAN:     1)  Stage 4 PD carcinoma non specific path   Post neurosurgery 8/11/21 and path likely lung could be breast  Prior molecular testing negative except for :  PD-L1 22C3 FDA for NSCLC: HIGH EXPRESSION   Tumor Proportion Score: 90%   Spiculated RUL lung mass on CT 7/21    Seen for fu today. Did neurosurgery and recovered well. Path c/w likely lung, small chance breast.   No recent mammo so will order. Breast exam negative today. Pt for Vincenzo Johns with Dr Zion Angeles in 2 weeks. PET +in lung and mediastinal LNs/ possible adrenal met. Reviewed with pt today. Discussed systemic therapy options today. Reviewed chemo and immunotherapy options today. Since likely lung and PDL1 90% and can do immunotherapy alone. Pt does not want chemo and is unsure about doing immunotherapy but will consider this. discussed Enolia Babinski today. Teaching for Enolia Babinski today. Pt does not want COVID vaccine. Reviewed higher risk of getting COVID/ delta and dying of COVID due to no vaccine. Pt wants to start in a month. Clinically doing well. 2) brain mets post neurosurgery. Doing well. For [facility]. 3) liver mass on CT. Negative PET. 4) adrenal mass. + on PET. Monitor. 5) smoker. Wants to quit. Trying with chantix. Per PCP. 6) anxiety. Xanax prn. Per PCP. 7) HA post surgery. Mild. Original HA gone. 8) psychosocial. Mood good. . Coping well with family support. 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 Was in [employer]. SW support today. Fu here in 1 month  Call if questions    I appreciate the opportunity to participate in Ms. Margie Fonseca's care.     Signed By: Alvin Gupta,

## 2021-08-19 NOTE — PROGRESS NOTES
Pharmacy Note- Immunotherapy Education    Jewel Suggs is a  63 y. o.female  diagnosed with metastatic carcinoma - likely lung primary here today for  chemotherapy counseling and consent. Ms. Anny Smith is being treated with pembrolizumab. Ms. Anny Smith was provided information regarding the risks of immune-mediated adverse reactions secondary to pembrolizumab that may require interruption/delay of treatment and possible use of corticosteroids. These reactions include, but are not limited to: colitis (diarrhea or severe abdominal pain); hepatitis (jaundice, severe nausea, or vomiting, easy bruising, and/or bleeding); hypophysitis (persistent or unusual headache, extreme weakness, dizziness/fainting, and/or vision changes); dermatitis (skin rash, mouth sores, skin blisters, and/or skin peeling); ocular toxicity (uveitis, iritis, and/or episcleritis); neuropathy (motor or sensory); hyper/hypothyroidism. Patient given ways to manage these side effects and when to contact office. [address] Handout of medications provided to patient. Ms. Anny Smith verbalized understanding of the information presented and all of the patient's questions were answered.     Leah Aragon, PharmD, BCPS, BCOP    For Pharmacy Admin Tracking Only     CPA in place: No   Recommendation Provided To: Patient/Caregiver: 1 via In person     Intervention Accepted By: Patient/Caregiver: 1   Time Spent (min): 20

## 2021-08-19 NOTE — PROGRESS NOTES
Ruth Kramer is a 63 y.o. female  Chief Complaint   Patient presents with    Follow-up    Breast Cancer     1. Have you been to the ER, urgent care clinic since your last visit? Hospitalized since your last visit? Yes, patient just had surgery on her head to remove tumor. 2. Have you seen or consulted any other health care providers outside of the [address] [place] [address] since your last visit? Include any pap smears or colon screening.  No.

## 2021-08-19 NOTE — PROGRESS NOTES
Request for records/mammogram reports faxed with confirmation of receipt to Dr. Carlos Perez office. Confirmation of receipt received.

## 2021-08-20 NOTE — TELEPHONE ENCOUNTER
[facility] received and scanned into media. Did not contain mammogram reports    Follow up call to patient, left VM to contact RN at MD office. Contact hours/info received.

## 2021-08-23 NOTE — TELEPHONE ENCOUNTER
Patient returned RN call, ID verified X 2. States last mammogram was approximately 5 years ago and was done at [facility]. Was not included in records from GYN. Records requested from [facility]. Patient to schedule mammogram ordered by MD on 8/19/21. Update to Provider.

## 2021-08-24 NOTE — PROGRESS NOTES
Case discussed a neuro conference today  Reviewed path but no pathologist present  Reviewed imaging  C spine lesion present on PET re review (not on PET report)  Will need C spine MRI    Please make sure pt is going to see [facility] Nat team Dr Irma Fermin

## 2021-08-27 NOTE — PROGRESS NOTES
Call to patient, ID verified X 2. Provider message relayed regarding PET scan. Understanding verbalized. States update to Provider - had mammogram today and was WDL, sees Dr. Evans Bernheim on 9/1/21 - records request prepared. Is scheduled to begin treatment on 9/16/21 but willing to move forward if needed.

## 2021-08-30 NOTE — PROGRESS NOTES
HIPPA Verified. Called pt on mobile phone number listed. Patient was made aware MRI Cervical Spine was ordered for her to have done for further eval. Patient verbalized understanding and stated she is scheduled to see Bhavik Knapp tomorrow 8/31/21 and stated she is having a Brain MRI done for that group as well. Patient was appreciative for update. She was given scheduling team number as well.   Betty Salgado

## 2021-09-09 NOTE — TELEPHONE ENCOUNTER
Patient called re a refill on her Oxy, and to report that she had just had the Gamma knife procedure and to report on her status.

## 2021-09-10 NOTE — TELEPHONE ENCOUNTER
Attempted to call patient off mobile phone number listed, no answer. Left a voicemail in regards to update on Oxycodone and to give the office a call back!   Artur Bernardo

## 2021-09-10 NOTE — TELEPHONE ENCOUNTER
HIPPA Verified. Patient gave the office a call back for an update, patient was made aware her Oxycodone has been refilled here at [facility]. Patient verbalized understanding and expressed no question!   Denzel Ocean

## 2021-09-10 NOTE — TELEPHONE ENCOUNTER
Refilled oxycodone to [facility] pharmacy a requested  Please let her know

## 2021-09-13 NOTE — PROGRESS NOTES
MA has faxed most recent MRI (Cerv Spine) scan report to the Radiation Oncology office at fax number: [phone], fax confirmation was received back! This was done.   Gi Sanchez

## 2021-09-15 NOTE — TELEPHONE ENCOUNTER
Call to patient, ID verified X 2. Scheduled for C 1, D 1 Pembolizumab on 9/16/21. Reviewed location of [facility], appt times, policies and procedures. Encouraged patient to dress for comfort, will also bring items of comfort and support, is familiar with location from her brother's prior treatment. Plans to eat prior to admission and bring snacks with her. Update to Provider.

## 2021-09-16 NOTE — PROGRESS NOTES
[facility] Chemo Progress Note    Date: 2021    Name: Ancelmo Found    MRN: [id number]         : 1960    1050 Ms. Fonseca Arrived to [facility] for Cycle 1 Keytruda ambulatory in stable condition. Assessment was completed, no acute issues at this time, no new complaints voiced. Peripheral IV accessed with positive blood return. Labs drawn and sent for processing. PIV flushed and capped for MD appointment. 1103 Patient upstairs to MD appointment. 1353 PIV flushed with positive blood return and NS started at [facility]. Chemotherapy Flowsheet 2021   Cycle C1   Date 2021   Drug / Regimen Keytruda   Notes given         Patient denies SOB, fever, cough, general not feeling well. Patient denies recent exposure to someone who has tested positive for COVID-19. Patient denies having contact with anyone who has a pending COVID test.      Ms. Fonseca's vitals were reviewed. Patient Vitals for the past 12 hrs:   Temp Pulse Resp BP   21 1433  90  116/77   21 1052 97.1 °F (36.2 °C) 91 16 112/69         Lab results were obtained and reviewed. Recent Results (from the past 12 hour(s))   CBC WITH AUTOMATED DIFF    Collection Time: 21 11:02 AM   Result Value Ref Range    WBC 14.9 (H) 3.6 - 11.0 K/uL    RBC 3.53 (L) 3.80 - 5.20 M/uL    HGB 10.9 (L) 11.5 - 16.0 g/dL    HCT 33.5 (L) 35.0 - 47.0 %    MCV 94.9 80.0 - 99.0 FL    MCH 30.9 26.0 - 34.0 PG    MCHC 32.5 30.0 - 36.5 g/dL    RDW 13.9 11.5 - 14.5 %    PLATELET 830 (H) 295 - 400 K/uL    MPV 10.0 8.9 - 12.9 FL    NRBC 0.0 0  WBC    ABSOLUTE NRBC 0.00 0.00 - 0.01 K/uL    NEUTROPHILS 68 32 - 75 %    LYMPHOCYTES 24 12 - 49 %    MONOCYTES 7 5 - 13 %    EOSINOPHILS 0 0 - 7 %    BASOPHILS 0 0 - 1 %    IMMATURE GRANULOCYTES 1 (H) 0.0 - 0.5 %    ABS. NEUTROPHILS 10.2 (H) 1.8 - 8.0 K/UL    ABS. LYMPHOCYTES 3.5 0.8 - 3.5 K/UL    ABS. MONOCYTES 1.1 (H) 0.0 - 1.0 K/UL    ABS. EOSINOPHILS 0.0 0.0 - 0.4 K/UL    ABS.  BASOPHILS 0.1 0.0 - 0.1 K/UL ABS. IMM. GRANS. 0.1 (H) 0.00 - 0.04 K/UL    DF AUTOMATED     METABOLIC PANEL, COMPREHENSIVE    Collection Time: 09/16/21 11:02 AM   Result Value Ref Range    Sodium 134 (L) 136 - 145 mmol/L    Potassium 3.9 3.5 - 5.1 mmol/L    Chloride 103 97 - 108 mmol/L    CO2 23 21 - 32 mmol/L    Anion gap 8 5 - 15 mmol/L    Glucose 88 65 - 100 mg/dL    BUN 7 6 - 20 MG/DL    Creatinine 0.78 0.55 - 1.02 MG/DL    BUN/Creatinine ratio 9 (L) 12 - 20      GFR est AA >60 >60 ml/min/1.73m2    GFR est non-AA >60 >60 ml/min/1.73m2    Calcium 9.4 8.5 - 10.1 MG/DL    Bilirubin, total 0.3 0.2 - 1.0 MG/DL    ALT (SGPT) 18 12 - 78 U/L    AST (SGOT) 14 (L) 15 - 37 U/L    Alk. phosphatase 117 45 - 117 U/L    Protein, total 7.8 6.4 - 8.2 g/dL    Albumin 3.1 (L) 3.5 - 5.0 g/dL    Globulin 4.7 (H) 2.0 - 4.0 g/dL    A-G Ratio 0.7 (L) 1.1 - 2.2     TSH 3RD GENERATION    Collection Time: 09/16/21 11:02 AM   Result Value Ref Range    TSH 0.89 0.36 - 3.74 uIU/mL   HEP B SURFACE AB    Collection Time: 09/16/21 11:04 AM   Result Value Ref Range    Hepatitis B surface Ab <3.10 mIU/mL    Hep B surface Ab Interp. NONREACTIVE NR     HEPATITIS B CORE AB, IGM    Collection Time: 09/16/21 11:04 AM   Result Value Ref Range    Hepatitis B core, IgM NONREACTIVE NR     HEP B SURFACE AG    Collection Time: 09/16/21 11:04 AM   Result Value Ref Range    Hepatitis B surface Ag <0.10 Index    Hep B surface Ag Interp. Negative NEG         Pre-medications  were administered as ordered and chemotherapy was initiated.   Medications Administered     0.9% sodium chloride infusion     Admin Date  09/16/2021 Action  New Bag Dose  25 mL/hr Rate  25 mL/hr Route  IntraVENous Administered By  Camille Abdul RN          pembrolizumab [facility]) 400 mg in 0.9% sodium chloride 100 mL, overfill volume 10 mL IVPB     Admin Date  09/16/2021 Action  New Bag Dose  400 mg Rate  252 mL/hr Route  IntraVENous Administered By  Camille Abdul, RN          sodium chloride (NS) flush 10 mL Admin Date  09/16/2021 Action  Given Dose  10 mL Route  IntraVENous Administered By  Jorgito Mtz, [address] Patient tolerated treatment well. PIV maintained positive blood return throughout treatment. PIV flushed and de accessed per protocol. Patient was discharged from [facility] in stable condition. Patient aware of next appointment.      Future Appointments   Date Time Provider Lakeshia Valencia   10/28/2021 11:00 AM B[facility] [address] [place] [address]   10/28/2021 11:15 AM Wanda Owens  N Rocio Sánchez BS AMB   12/9/2021 11:00 AM B3 J[facility]  [address] RADHA PARMAR   1/20/2022 11:00 AM B3 [facility]         Job Ribeiro RN  September 16, 2021

## 2021-09-16 NOTE — PROGRESS NOTES
Ke Foote is a 63 y.o. female  Chief Complaint   Patient presents with    Chemotherapy      metastatic cancer likely lung primary     1. Have you been to the ER, urgent care clinic since your last visit? Hospitalized since your last visit? Yes, patient went to have Brain Surgery and had [facility]. 2. Have you seen or consulted any other health care providers outside of the [address] [place] [address] since your last visit? Include any pap smears or colon screening.  No.

## 2021-09-16 NOTE — PROGRESS NOTES
[facility] [place] at [address] [place] [address] [facility], [address]  W: [phone]  F: [phone]    Reason for Visit:   Ke Foote is a 63 y.o. female who is seen for fu of metastatic cancer likely lung primary    Treatment History:   8/11/21 PROCEDURE PERFORMED:  BrainLAB-guided stereotactic craniotomy resection of left temporal hemorrhagic metastatic brain tumor, use of operating microscope and stereotaxy    STAGE: 4 with brain mets    History of Present Illness:     Pt seen today for office for fu of new metastatic PD carcinoma PD-L1 22C3 FDA for NSCLC: HIGH EXPRESSION   Tumor Proportion Score: 90%   by brain path post mass resection. MRI c spine shows mets there. Has not seen Rad/onc. Did do GK. Pt is here today to start immunotherapy. C/o fatigue and joint/ neck pain. Has oxycodone prn and this works. No fevers/ chills/ chest pain/ SOB/ nausea/ vomiting/diarrhea/        Last visit:;   Post neurosurgery 8/11/21. PATH:   1.  Brain, left temporal tumor, biopsy:        Metastatic poorly differentiated carcinoma   See comment   2.  Brain, left temporal tumor, excision:        Metastatic poorly differentiated carcinoma   See comment   Comment   Immunohistochemical stains reveal reactivity for CK7 and NATHANIEL-3, with   patchy/focal p40 staining. ER, MO, HER2, TTF-1, MART-1, SOX-10, CK20, and   CDX-2 stains are negative in the malignant cells.     The patient's recent lung/mediastinal lymph node biopsies are reviewed and   the current tumor shares some morphologic characteristics with the   malignant cells in the 4R lymph node cell block ([id number]), however   evaluation is limited by extensive necrosis.  A subset of pulmonary   adenocarcinomas show NATHANIEL-3 expression (approximately 12%), therefore   metastatic carcinoma from a lung primary is still a possibility, however   NATHANIEL-3 expression is much more commonly seen in breast and urothelial   primaries, which should be reasonably excluded clinically. Note is made that the patient's recent lung biopsy was sent for molecular   testing which is pending, therefore additional testing is not requested on   this specimen. If needed, tumor material is available in block 2A of the   current specimen for ancillary testing. These findings were discussed with Dr. Rogelio Seay via [employer]   at 2:30 p.m. on 8/13/2021. Pt is here today to discuss treatment plan. Prior molecular testing negative except for :  PD-L1 22C3 FDA for NSCLC: HIGH EXPRESSION   Tumor Proportion Score: 90%     Pt is not doing chemo. Not sure she wants any treatment. Wants disability. Does not want to get COVID vaccine. States TYPE 0 blood does not get COVID. Here today for discussion of treatment. Has some HA still. But prior HA is gone. No fevers/ chills/ chest pain/ SOB/ nausea/ vomiting/diarrhea/ pain/fatigue            Last visit: consult for new lung mass on CT chest 7/23/21. Pt went to [facility] for HA and had some chest wall pain. CXR showed RIGHT chest masses and CT chest recommended. CT chest:   IMPRESSION  Spiculated right upper lobe mass with positive right hilar and mediastinal  adenopathy. Probable hepatic and possible left adrenal metastases. Recommend  PET/CT. No CP/SOB. recently went to [place]/ has been traveling. Still has HAs. Has RUQ abdominal pain. Good appetite. Active. No medical problems. Hx of RIGHT hip pain. 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 Was in [facility] [facility]. .   Has grand kids. Has two ex husbands. Letta Loss bro cancer H+N cancer  No fevers/ chills/ chest pain/ SOB/ nausea/ vomiting/diarrhea/ pain/fatigue          Past Medical History:   Diagnosis Date    Cancer ([facility] [address] 2021    LUNGS AND BRAIN AND LYMPH NODES    Hypercholesterolemia     Metastatic carcinoma ([facility] [address] 8/19/2021      Past Surgical History:   Procedure Laterality Date    HX BREAST AUGMENTATION      HX HERNIA REPAIR      IMPLANT BREAST SILICONE/EQ Bilateral 1517    sub-pectoral      Social History     Tobacco Use    Smoking status: Current Every Day Smoker     Packs/day: 0.75     Years: 40.00     Pack years: 30.00    Smokeless tobacco: Never Used   Substance Use Topics    Alcohol use: Yes     Alcohol/week: 3.0 standard drinks     Types: 3 Cans of beer per week     Comment: socially      Family History   Problem Relation Age of Onset    Depression Mother     Obesity Mother     Heart Disease Mother     MS Sister         END STAGE    Cancer Brother         TONSIL, WITH METS TO LIVER AND BONES    Heart Disease Maternal Grandfather     Cancer Paternal Grandmother     Anesth Problems Neg Hx      Current Outpatient Medications   Medication Sig    oxyCODONE IR (ROXICODONE) 5 mg immediate release tablet Take 1 Tablet by mouth every four (4) hours as needed for Pain for up to 30 days. Max Daily Amount: 30 mg.    dexAMETHasone (DECADRON) 4 mg tablet Take 1 tab PO every 12 hours tonight then 1/2 a tab PO every 12 hours x 2 days then 1/2 a tab daily x 2 days then stop.  acetaminophen (TYLENOL) 325 mg tablet Take 2 Tablets by mouth every six (6) hours as needed for Pain.  tiotropium (Spiriva with HandiHaler) 18 mcg inhalation capsule Take 1 Capsule by inhalation daily.  levETIRAcetam (KEPPRA) 500 mg tablet Take 1 Tablet by mouth two (2) times a day.  medroxyPROGESTERone (Provera) 5 mg tablet Take 5 mg by mouth daily.  varenicline (Chantix) 1 mg tablet Take 1 mg by mouth two (2) times daily (after meals).  estradioL (Estrace) 0.5 mg tablet Take 0.5 mg by mouth daily.  ALPRAZolam (XANAX) 1 mg tablet TAKE 1 TABLET BY MOUTH EVERY 4 HOURS AS NEEDED FOR ANXIETY    rosuvastatin (CRESTOR) 10 mg tablet TAKE 1 TABLET BY MOUTH EVERY DAY     No current facility-administered medications for this visit.       No Known Allergies     A complete review of systems was obtained, negative except as described above and as reported on ROS sheet scanned into system. Physical Exam:     Visit Vitals  /69 (BP 1 Location: Left upper arm, BP Patient Position: Sitting)   Pulse 91   Temp 97.1 °F (36.2 °C) (Temporal)   Ht 5' 6\" (1.676 m)   Wt 158 lb (71.7 kg)   SpO2 98%   BMI 25.50 kg/m²     ECOG PS:  1  General: No distress  Eyes: PERRLA, anicteric sclerae  HENT: Atraumatic, wearing mask  Neck: Supple  Respiratory: CTAB, normal respiratory effort  CV: Normal rate, regular rhythm, no murmurs, no peripheral edema  GI: Soft, nontender, nondistended, no masses  MS: Normal gait and station. Digits without clubbing or cyanosis. Skin: No rashes, ecchymoses, or petechiae. Normal temperature, turgor, and texture. Psych: Alert, oriented, appropriate affect, normal judgment/insight  Breast no masses b/l, implants b/l     Results:     Lab Results   Component Value Date/Time    WBC 14.9 (H) 09/16/2021 11:02 AM    HGB 10.9 (L) 09/16/2021 11:02 AM    HCT 33.5 (L) 09/16/2021 11:02 AM    PLATELET 801 (H) 75/59/9955 11:02 AM    MCV 94.9 09/16/2021 11:02 AM    ABS. NEUTROPHILS 10.2 (H) 09/16/2021 11:02 AM     Lab Results   Component Value Date/Time    Sodium 134 (L) 09/16/2021 11:02 AM    Potassium 3.9 09/16/2021 11:02 AM    Chloride 103 09/16/2021 11:02 AM    CO2 23 09/16/2021 11:02 AM    Glucose 88 09/16/2021 11:02 AM    BUN 7 09/16/2021 11:02 AM    Creatinine 0.78 09/16/2021 11:02 AM    GFR est AA >60 09/16/2021 11:02 AM    GFR est non-AA >60 09/16/2021 11:02 AM    Calcium 9.4 09/16/2021 11:02 AM    Glucose (POC) 109 08/16/2021 08:39 AM     Lab Results   Component Value Date/Time    Bilirubin, total 0.3 09/16/2021 11:02 AM    ALT (SGPT) 18 09/16/2021 11:02 AM    Alk.  phosphatase 117 09/16/2021 11:02 AM    Protein, total 7.8 09/16/2021 11:02 AM    Albumin 3.1 (L) 09/16/2021 11:02 AM    Globulin 4.7 (H) 09/16/2021 11:02 AM       CT Results (most recent):  Results from Hospital Encounter encounter on 08/11/21    CT HEAD WO CONT    Narrative  EXAMINATION:  CT HEAD WO CONT    CLINICAL INFORMATION:  postop  COMPARISON:  CT of 8/11/2021, MRI of 8/3/2021  TECHNIQUE: Routine axial head CT was performed. IV contrast was not  administered. Sagittal and coronal reconstructions were generated. CT dose reduction was achieved through use of a standardized protocol tailored  for this examination and automatic exposure control for dose modulation. FINDINGS:  Recent left temporoparietal craniotomy with resection of the previously  demonstrated 2.5 cm left temporal lobe mass. Limited gas and high density noted  at the surgical site. Mild residual vasogenic edema. Left frontal  pneumocephalus. No postoperative complication. VENTRICULAR SYSTEM:  Normal for age. BASAL CISTERNS:  Patent. BRAIN PARENCHYMA:  No new findings. MIDLINE SHIFT:  None. CALVARIUM/ SKULL BASE: No destructive lesions. Left frontoparietal craniotomy. PARANASAL SINUSES AND MASTOID AIR CELLS: Clear. VISUALIZED ORBITS: No significant abnormalities. SELLA: No enlargement. Impression  Recent left temporoparietal craniotomy with tumor resection. No  operative complications. MRI Results (most recent):  Results from [place] encounter on 09/12/21    MRI CERV SPINE W WO CONT    Narrative  EXAM: MRI CERV SPINE W WO CONT    INDICATION: Met lung cancer, + c spine lesion on PET, further eval. Malignant  neoplasm of unspecified part of unspecified bronchus or lung    COMPARISON: PET/CT 8/16/2021. TECHNIQUE: MR imaging of the cervical spine was performed using the following  sequences: sagittal T1, T2, STIR;  axial T2, T1 prior to and following contrast  administration. CONTRAST: 15 mL of ProHance. FINDINGS:  C4 vertebra shows abnormal marrow signal and enhancement compatible with  tumor/metastasis. There is extension into the right foramina and transverse  process surrounding the patent vertebral artery. There is no significant  intraspinal extension and no cord compression.     There is normal alignment of the cervical spine. Vertebral body heights are maintained    The craniocervical junction is intact. The course, caliber, enhancement, and  signal intensity of the spinal cord are normal.    The paraspinal soft tissues are within normal limits. C2-C3: No herniation or stenosis. C3-C4: No herniation or spinal stenosis. Left foraminal stenosis. C4-C5: No disc herniation. Tumor encroachment on the right foramen. Degenerative  disc/facet disease with mild spinal stenosis and left foraminal stenosis. C5-C6: No disc herniation. Advanced disc degeneration. Mild spinal stenosis and  bilateral foraminal encroachment. C6-C7: No disc herniation. Advanced disc degeneration. Mild spinal stenosis and  bilateral foraminal encroachment. C7-T1: No herniation or stenosis. Impression  1. C4 vertebral tumor/metastasis extending into the right foramen, without  spinal stenosis. 2. Multilevel cervical degenerative disc disease. 3. No fracture or subluxation. PET Results (most recent):  Results from [place] encounter on 08/16/21    PET/CT TUMOR IMAGE SKULL THIGH W (INI)    Addendum 8/24/2021  2:23 PM  Addendum: There is a focus of increased tracer activity corresponding to a 5 mm  lytic lesion in the C4 vertebral body, compatible with metastatic disease. Narrative  PET/CT SCAN    PROCEDURE: Following IV injection of 9.98 mCi 18 Fluoro 2 deoxyglucose (FDG) and  a standard uptake delay, PET imaging is performed from the skull vertex to mid  thigh and axial, sagittal and coronal images were acquired. Unenhanced CT is  obtained for anatomic localization, and attenuation correction of the PET scan. Patient preprocedure blood glucose level: 109mg/dL. CORRELATIVE IMAGING STUDIES: Chest CT 7/30/2021. COMPARISON PET: None    HISTORY: The study is requested for staging metastatic poorly differentiated  carcinoma. Biopsy confirmed diagnosis.     FINDINGS:    HEAD/NECK: Photopenia corresponds to the left frontal postoperative changes. CHEST: Increased tracer activity corresponds to the lesion in the right upper  lobe, maximum SUV is 7.3. Additionally, there is hypermetabolic right  paratracheal, precarinal, and right hilar lymphadenopathy. ABDOMEN/PELVIS: Hypermetabolic right adrenal mass, maximum SUV is 9.3. There is  no abnormal uptake to correspond to the small hypodense lesion in the liver. SKELETON: No foci of abnormal hypermetabolism in the axial and visualized  appendicular skeleton. Impression  Hypermetabolic mass lesion right upper lobe with hypermetabolic  mediastinal and right hilar lymphadenopathy. Hypermetabolic right adrenal mass  is concerning for metastatic disease. Records reviewed and summarized above. Pathology report(s) reviewed above. Radiology report(s) reviewed above. Assessment/PLAN:     1)  Stage 4 PD carcinoma non specific path   Post neurosurgery 8/11/21 and path likely lung could be breast  Prior molecular testing negative except for :  PD-L1 22C3 FDA for NSCLC: HIGH EXPRESSION   Tumor Proportion Score: 90%   Spiculated RUL lung mass on CT 7/21    Seen for fu today to start immunotherapy.   good recent mammo    GK with Dr Susana Marie    PET +in lung and mediastinal LNs/ possible adrenal met. Since likely lung and PDL1 90% and can do immunotherapy alone. Pt is for [facility] today. We discussed the risks and benefits of pembrolizumab therapy today. Potential side effects include, but are not limited to fatigue, rash, auto-immune issues, infertility, allergic reactions, and rarely, death. The patient has consented to beginning therapy. Pt does not want COVID vaccine. Reviewed higher risk of getting COVID/ delta and dying of COVID due to no vaccine. Proceed with [facility] every 6 weeks. Goal of care palliative. Clinically doing well. 2) brain mets post neurosurgery. Doing well. For [facility]. 3) c spine met.   To Rad/onc for treatment. 4) pain/ arthralgia. On oxycodone and working. Wants referral to palliative care and done today. 5) smoker. Wants to quit. Trying with chantix. Per PCP. 6) chronic anxiety. Xanax prn. Per PCP. 7) HA post surgery. Mild. Original HA gone. 8) psychosocial. Mood good. . Coping well with family support. 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 Was in [employer]. SW support today. Fu here in 6 weeks with next tx. Call if questions    I appreciate the opportunity to participate in Ms. Frances Fonseca's care.     Signed By: Ramy Mercer,

## 2021-09-16 NOTE — PROGRESS NOTES
Pharmacy Note- Immunotherapy Education     Gabo Barr is a  63 y. o.female  diagnosed with metastatic carcinoma - likely lung primary here today for Cycle 1, Day 1 chemotherapy counseling. Ms. Wilian Gomez is being treated with pembrolizumab.         Ms. Fonseca was provided information regarding the risks of immune-mediated adverse reactions secondary to pembrolizumab that may require interruption/delay of treatment and possible use of corticosteroids. These reactions include, but are not limited to: colitis (diarrhea or severe abdominal pain); hepatitis (jaundice, severe nausea, or vomiting, easy bruising, and/or bleeding); hypophysitis (persistent or unusual headache, extreme weakness, dizziness/fainting, and/or vision changes); dermatitis (skin rash, mouth sores, skin blisters, and/or skin peeling); ocular toxicity (uveitis, iritis, and/or episcleritis); neuropathy (motor or sensory); hyper/hypothyroidism.     Patient given ways to manage these side effects and when to contact office.        Ms. Fonseca verbalized understanding of the information presented and all of the patient's questions were answered.     Chica Duarte, PharmD, [facility], [facility] [address] in place: No   Recommendation Provided To: Patient/Caregiver: 1 via In person     Intervention Accepted By: Patient/Caregiver: 1   Time Spent (min): 15

## 2021-09-20 NOTE — TELEPHONE ENCOUNTER
Follow up call to patient, ID verified X 2. S/P C 1, D 1 Pembrolizumab. States feels well, denies common issues/side effects post treatment. Has noticed increased pain on right \"where the cancer is. \"  Has been taking pain meds prescribed, occasionally has to double up. Reports a friend gave her some Flexeril and that has helped with neck issues. Has upcoming appt with Dr. Pearl Dumas on 9/22/21. Will contact MD office for issues. Update to Provider.

## 2021-09-22 NOTE — TELEPHONE ENCOUNTER
Mariano Atkinson w/ Dr. Alfredo Bautista office is f/u on status of the new patient referral. Leyda Snyder # [phone].

## 2021-09-22 NOTE — TELEPHONE ENCOUNTER
[facility] Palliative Medicine Office  Nursing Note  [phone]  Fax [phone]      Name:  Chelo Bose  YOB: 1960    Received outpatient Palliative Medicine referral from Dr. Brianda Vargas to see pt for symptom management and supportive care. Chart  reviewed. Chelo Bose is a 63 y.o. female with NSCLC with brain mets. Pt's most recent office visit with Dr. Eric Dasilva was on 9/16/21. See her note for complete HPI, treatment history, and plan. Nurse called pt and introduced outpatient Palliative Medicine services.   Patient scheduled for next available appt, 9/27/21 at 9:30am.    MITCHEL Lee, RN-BC, [facility]

## 2021-09-22 NOTE — PROGRESS NOTES
Records requested from Dr. Billy Wyatt received and to Provider for review prior to scanning into Media.

## 2021-09-24 NOTE — PROGRESS NOTES
Palliative Medicine Outpatient Services  [address] [place] [address]: [phone] ([phone])    Patient Name: Ke Foote  YOB: 1960    Date of Current Visit: 09/27/21  Location of Current Visit:    [x] [facility] Office  [] [facility] Office  [] [address] [place] [address]  [] Home  []Synchronous real-time A/V virtual visit    Date of Initial Visit: 9/27/21  Referral from: Smita Roblero DO  Primary Care Physician: None      SUMMARY:   Ke Foote is a 63y.o. 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 Colette Bone for symptom management and psychosocial support. She was diagnosed in 7/2021, underwent surgical resection of left temporal mass 8/11/21. She is currently being treated with immune therapy [facility] [place]) and has been referred to Radiation-Oncology for C4 lesion. She is receiving cancer-directed therapy with the goal of palliation of symptoms and prolongation of quality life. Quality of life (rather than quantity) is important to her and guides her health care decisions. The patients social history includes: she is  and lives alone. 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 She has an adult daughter, Anila Gunn, who has a 11year old [facility]. She also has a 24year old daughter, Rosita Zuniga, who lives in [place] and plans to attend veterinary school. She has a sister in [place], [place]. She has a sister in [address] [place] [address] who has end stage MS, and an 80year old mother in [address] [place] [address], who is 360 lbs, and currently on hospice. The patient takes care of her mother, and her mother's home, several days each week. She has a best friend, Gosia Spicer, who works for [employer]. Palliative Medicine is addressing the following current patient/family concerns: symptoms related to metastatic carcinoma; support in care decisions consistent with her personal goals and values.     Initial Referral Intake note reviewed   PALLIATIVE DIAGNOSES:       ICD-10-CM ICD-9-CM    1. Abdominal pain, generalized  R10.84 789.07 morphine CR (MS CONTIN) 15 mg CR tablet   2. Neck pain  M54.2 723.1 morphine CR (MS CONTIN) 15 mg CR tablet   3. Flank pain  R10.9 789.09    4. Fatigue, unspecified type  R53.83 780.79    5. Chronic idiopathic constipation  K59.04 564.00    6. Anxiety  F41.9 300.00    7. Metastatic carcinoma (HCC)  C79.9 199.1    8. Non-small cell lung cancer, unspecified laterality (HCC)  C34.90 162.9 oxyCODONE IR (ROXICODONE) 5 mg immediate release tablet          PLAN:   Patient Instructions     Dear Perry Batista ,    It was a pleasure seeing you today in [address]. We will see you again in 2 weeks for a televisit. If labs or imaging tests have been ordered for you today, please call the office  at [phone] 48 hours after completion to obtain the results. Your described symptoms were: Fatigue: 8 Drowsiness: 0   Depression: 0 Pain: 6   Anxiety: 5 Nausea: 0   Anorexia: 0 Dyspnea: 4   Best Well-Bein Constipation: Yes   Other Problem (Comment): 0       This is the plan we talked about:    1. Pain  -Start extended-release morphine 15-mg every 12 hours  -Continue oxycodone 5-mg: take 1 to 2 tabs every 4 as needed  -Continue tylenol 500-mg: take 2 tabs every 6 hours as needed    2. Fatigue  -Continue Xanax 1-mg at bedtime  -You may find you sleep throughout the night after starting extended-release morphine    3. Constipation  -Start senna-docusate sodium: take 1 to 2 tabs at bedtime  -Avoid Metamucil    4. Anxiety  -Continue Xanax 1-mg: taking 1/2 to 1 tab every 6 hours as needed  -Today we talked about adding an anxiolytic medication like Zoloft and  You're going to give this some thought    5. Lung cancer  -You're receiving Keytruda under the care of Dr. Robbie Arias  -You start radiation therapy to your neck tomorrow    6.  Advance Care Planning/Goals-of-Care  -You receiving cancer-directed therapy with the goal of palliation of symptoms and prolongation of quality life  -Quality of life is more important to you than quantity of life and you make your health care decisions guided by this value  -You've completed your Advance Medical Directives, naming your friend, Melly Conti, who is a hospice nurse, as your primary medical decision-maker      This is what you have shared with us about Advance Care Planning:      Primary Decision Maker (Active): Felicita Guerra  [phone]  Advance Care Planning 9/27/2021   Patient's Juancarlos Butterfield is: Named in scanned ACP document   Confirm Advance Directive Yes, on file   Patient Would Like to Complete Advance Directive -   Does the patient have other document types -           The Palliative Medicine Team is here to support you and your family. Sincerely,      Laura Chan MD and the Palliative Medicine Team      OPIOID SAFETY HANDOUT    You are being prescribed a type of pain medication called an \"opioid\". This medication can be very effective. However, if not taken correctly, it can also be dangerous. Thus, there are necessary precautions for your safety and the safety of others. Following these will allow us to continue to safely prescribe this medication for your pain. Bring your original medication bottles to EVERY visit with the medication you have left in  them. We cannot give you a new prescription without verifying the previous prescription. Opioids can cause fatigue or sleepiness. **Do not take with other medications that make you sleepy. **Do not take with alcohol or other drugs. Opioids can cause constipation. **Take a combination stool softener/laxative while taking opioids. Only take your pain medication as prescribed on the bottle or instructions.      Do not increase your pain medication dose without discussing it with your prescribing physician. If your pain becomes worse, please call Palliative Medicine at [phone]). Do not use the Emergency Department for your pain unless it is a life-threatening situation. Your pain medication was prescribed only for you. It is both unsafe and illegal to share or sell your pain medication. Do not drive within 72 hours of a change in dose or the addition of a new medication. Your pain medication should be kept in a locked container in your house. Only take out the number of pills you need for 24 hours. Keep all opioids (and other medications) away from a child's reach. If you pill bottle is lost or stolen, we typically cannot write a new prescription for the same medication until the date it was supposed to be finished. In order to keep you safe, we access the [employer]. This allows us to make sure you are not prescribed a similar medication that could interact with the opioids we have prescribed. Additionally, every patient prescribed opioids for more than 3 months will be asked to give a urine sample for drug testing at least once. This is a necessary test as guided by the [facility] for patients who are prescribed opioid medications. To dispose of your unused medications safely visit: www.fda.gov and search \"unused medications. \"     Please inform Palliative Medicine of any questions you may have about your pain medications by call [phone] [place] [phone]). We look forward to seeing you at the next office visit.     Roxann Mccarthy MD          GOALS OF CARE / TREATMENT PREFERENCES:   [====Goals of Care====]  GOALS OF CARE:  Patient / health care proxy stated goals: See Patient Instructions / Summary    TREATMENT PREFERENCES:   Code Status:  [x] Attempt Resuscitation       [] Do Not Attempt Resuscitation    Advance Care Planning:  [x] The [employer] Interdisciplinary Team has updated the ACP Navigator with Decision Maker and Patient Capacity      Primary Decision Maker (Active): Tahir De Santiago - [phone]  Advance Care Planning 9/27/2021   Patient's Healthcare Decision Maker is: Named in scanned ACP document   Confirm Advance Directive Yes, on file   Patient Would Like to Complete Advance Directive -   Does the patient have other document types -       Other:  (If patient appropriate for POST, consider using PALLPOST smart phrase here)    The palliative care team has discussed with patient / health care proxy about goals of care / treatment preferences for patient.  [====Goals of Care====]     PRESCRIPTIONS GIVEN:     Medications Ordered Today   Medications    morphine CR (MS CONTIN) 15 mg CR tablet     Sig: Take 1 Tablet by mouth every twelve (12) hours for 30 days. Max Daily Amount: 30 mg. Dispense:  60 Tablet     Refill:  0    oxyCODONE IR (ROXICODONE) 5 mg immediate release tablet     Sig: Take 1 Tablet by mouth every four (4) hours as needed for Pain for up to 20 days. Max Daily Amount: 30 mg. Dispense:  120 Tablet     Refill:  0     OK to fill today           FOLLOW UP:     Future Appointments   Date Time Provider Lakeshia Valencia   10/12/2021 12:30 PM Nereida Morgan MD PCS BS [facility]   10/28/2021 11:00 AM B3 JERZY MED [facility]   10/28/2021 11:15 AM Ke Ivy  N [facility]   12/9/2021 11:00 AM B3 JERZY  [facility]   1/20/2022 11:00 AM B3 JERZY MED [place] [facility]           PHYSICIANS INVOLVED IN CARE:   Patient Care Team:  None as PCP - General  Misty Nguyễn DO (Hematology and Oncology)  Silvia Nelson RN as Benefits Care Manager  Jt Flores MD as Physician (Palliative Medicine)  Jt Flores MD as Physician (Palliative Medicine)       HISTORY:   Reviewed patient-completed ESAS and advance care planning form.   Reviewed patient record in prescription monitoring program.    CHIEF COMPLAINT:   Chief Complaint Patient presents with    Abdominal Pain    Neck Pain       HPI/SUBJECTIVE:    The patient is: [x] Verbal / [] Nonverbal     She had a headache and pain in her right chest when she returned from a Trip to [facility] last July. She had a chest X-ray which showed the mass in her lung, then had a CT scan which showed the brain mass. She had gamma knife (Dr. Kirt Machado), this helped with the headache. She started [place] about 2 weeks ago. Since then, she's had more pain in her right chest and in her abdomen \"where the cancer is. \"  She's also been having neck pain and a burning pain from her back, under her right shoulder blade. She's been taking 1 to 2 oxycodone ~ 4 times a day which helps but wears off after 4 to 4.5 hours. She's also been taking tylenol. .  She's going to start radiation therapy tomorrow for the lesion in her neck. She feels very fatigued this morning. She takes care of her grandson, Casi Cabrera, 3 days a week while her daughter works. He spends the night with her, too. She also cares for her 80-year old mother, who lives in her own home with hospice support. She goes to her mother's house every morning to help her bath, does the laundry. She hasn't been sleeping well since her cancer diagnosis. She takes Xanax at bedtime which helps her go to sleep, then takes melatonin when she wakes up in the middle of the night. She usually goes back to sleep after taking melatonin. She tried Ambien in the past and it made her feel too sedated the following day, interfered with her work. Her appetite is good. She's had trouble with her bowels all of her life. She goes between constipation and diarrhea. She's never seen a doctor for this. She usually takes Metamucil when she gets constipated. She hasn't noticed any difference in her bowels since she started taking oxycodone. She's been taking Xanax for 15 years for \"anger\" related to family issues.   She's used it from time-to-time in the past, more since her cancer diagnosis. She usually takes 1/2 tab during the day if she needs it. She's never taken any other medications for anxiety or depression. She wouldn't have had gamma knife but she needed to get her affairs in order. 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 She doesn't want to be like that. She now has a will and made plans for her cremation. She had a AMD; her friend, Chayito Aranda, is a hospice RN and is her decision-maker. Clinical Pain Assessment (nonverbal scale for nonverbal patients):   [++++ Clinical Pain Assessment++++]  [++++Pain Severity++++]: Pain: 6  [++++Pain Character++++]: burning under shoulder blade; sharp, knife-like other areas  [++++Pain Duration++++]: since 2021  [++++Pain Effect++++]:  [++++Pain Factors++++]: oxycodone helps; wearing abdominal binder helps  [++++Pain Frequency++++]: constant with varying intensity  [++++Pain Location++++]: neck; under right shoulder blade; upper quadrant abdomen; right lower flank  [++++ Clinical Pain Assessment++++]       FUNCTIONAL ASSESSMENT:     Palliative Performance Scale (PPS):  PPS: 70       PSYCHOSOCIAL/SPIRITUAL SCREENING:     Any spiritual / Orthodoxy concerns:  [] Yes /  [x] No    Caregiver Burnout:  [] Yes /  [] No /  [x] No Caregiver Present      Anticipatory grief assessment:   [x] Normal  / [] Maladaptive       ESAS Anxiety: Anxiety: 5    ESAS Depression: Depression: 0       REVIEW OF SYSTEMS:     The following systems were [x] reviewed / [] unable to be reviewed  Systems: constitutional, ears/nose/mouth/throat, respiratory, gastrointestinal, genitourinary, musculoskeletal, integumentary, neurologic, psychiatric, endocrine. Positive findings noted below.   Modified ESAS Completed by: provider   Fatigue: 8 Drowsiness: 0   Depression: 0 Pain: 6   Anxiety: 5 Nausea: 0   Anorexia: 0 Dyspnea: 4   Best Well-Bein Constipation: Yes   Other Problem (Comment): 0 PHYSICAL EXAM:     Wt Readings from Last 3 Encounters:   09/16/21 158 lb (71.7 kg)   08/19/21 157 lb 3.2 oz (71.3 kg)   08/16/21 154 lb (69.9 kg)     Blood pressure 105/69, pulse (!) 101, temperature 98.3 °F (36.8 °C), temperature source Oral, resp. rate 20, SpO2 97 %. Last bowel movement: See Nursing Note    Constitutional: appears fatigued, anxious  Eyes: pupils equal, anicteric  ENMT: no nasal discharge, moist mucous membranes  Cardiovascular: regular rhythm, no peripheral edema  Respiratory: breathing not labored, symmetric; no wheezes  Gastrointestinal: soft non-tender, +bowel sounds  Musculoskeletal: no deformity, no tenderness to palpation  Skin: warm, dry  Neurologic: following commands, moving all extremities  Psychiatric: full affect, no hallucinations  Other:       HISTORY:     Past Medical History:   Diagnosis Date    Cancer ([facility] [address] 2021    LUNGS AND BRAIN AND LYMPH NODES    Hypercholesterolemia     Metastatic carcinoma ([facility] [address] 8/19/2021      Past Surgical History:   Procedure Laterality Date    HX BREAST AUGMENTATION      HX HERNIA REPAIR      IMPLANT BREAST SILICONE/EQ Bilateral 1331    sub-pectoral      Family History   Problem Relation Age of Onset    Depression Mother     Obesity Mother     Heart Disease Mother     MS Sister         END STAGE    Cancer Brother         TONSIL, WITH METS TO LIVER AND BONES    Heart Disease Maternal Grandfather     Cancer Paternal Grandmother     Anesth Problems Neg Hx       History reviewed, no pertinent family history. Social History     Tobacco Use    Smoking status: Current Every Day Smoker     Packs/day: 0.75     Years: 40.00     Pack years: 30.00    Smokeless tobacco: Never Used   Substance Use Topics    Alcohol use:  Yes     Alcohol/week: 3.0 standard drinks     Types: 3 Cans of beer per week     Comment: socially     No Known Allergies   Current Outpatient Medications   Medication Sig    polyethylene glycol (Miralax) 17 gram/dose powder Take 17 g by mouth as needed for Constipation.  simethicone (GAS-X) 125 mg capsule Take 125 mg by mouth daily as needed for Flatulence.  morphine CR (MS CONTIN) 15 mg CR tablet Take 1 Tablet by mouth every twelve (12) hours for 30 days. Max Daily Amount: 30 mg.    oxyCODONE IR (ROXICODONE) 5 mg immediate release tablet Take 1 Tablet by mouth every four (4) hours as needed for Pain for up to 20 days. Max Daily Amount: 30 mg.    acetaminophen (TYLENOL) 325 mg tablet Take 2 Tablets by mouth every six (6) hours as needed for Pain.  tiotropium (Spiriva with HandiHaler) 18 mcg inhalation capsule Take 1 Capsule by inhalation daily.  levETIRAcetam (KEPPRA) 500 mg tablet Take 1 Tablet by mouth two (2) times a day.  medroxyPROGESTERone (Provera) 5 mg tablet Take 5 mg by mouth daily.  estradioL (Estrace) 0.5 mg tablet Take 0.5 mg by mouth daily.  ALPRAZolam (XANAX) 1 mg tablet TAKE 1 TABLET BY MOUTH EVERY 4 HOURS AS NEEDED FOR ANXIETY    rosuvastatin (CRESTOR) 10 mg tablet TAKE 1 TABLET BY MOUTH EVERY DAY    dexAMETHasone (DECADRON) 4 mg tablet Take 1 tab PO every 12 hours tonight then 1/2 a tab PO every 12 hours x 2 days then 1/2 a tab daily x 2 days then stop. (Patient not taking: Reported on 9/27/2021)    varenicline (Chantix) 1 mg tablet Take 1 mg by mouth two (2) times daily (after meals). (Patient not taking: Reported on 9/27/2021)     No current facility-administered medications for this visit.           LAB DATA REVIEWED:     Lab Results   Component Value Date/Time    WBC 14.9 (H) 09/16/2021 11:02 AM    HGB 10.9 (L) 09/16/2021 11:02 AM    PLATELET 284 (H) 95/50/3836 11:02 AM     Lab Results   Component Value Date/Time    Sodium 134 (L) 09/16/2021 11:02 AM    Potassium 3.9 09/16/2021 11:02 AM    Chloride 103 09/16/2021 11:02 AM    CO2 23 09/16/2021 11:02 AM    BUN 7 09/16/2021 11:02 AM    Creatinine 0.78 09/16/2021 11:02 AM    Calcium 9.4 09/16/2021 11:02 AM      Lab Results   Component Value Date/Time    Alk. phosphatase 117 09/16/2021 11:02 AM    Protein, total 7.8 09/16/2021 11:02 AM    Albumin 3.1 (L) 09/16/2021 11:02 AM    Globulin 4.7 (H) 09/16/2021 11:02 AM     No results found for: INR, PTMR, PTP, PT1, PT2, APTT, INREXT, INREXT   No results found for: IRON, FE, TIBC, IBCT, PSAT, FERR     CT chest 7/23/21:  Spiculated right upper lobe mass with positive right hilar and mediastinal  adenopathy. Probable hepatic and possible left adrenal metastases. Recommend  PET/CT. MRI brain 8/3/21:  1. 2.5 cm left temporal lobe mass with subacute hemorrhage. Considering  patient's history this is most likely metastasis. 2. Mild nonspecific white matter T2 hyperintensity may be related to mild  chronic small vessel ischemic change. PET-CT 8/32/96:  Hypermetabolic mass lesion right upper lobe with hypermetabolic  mediastinal and right hilar lymphadenopathy. Hypermetabolic right adrenal mass  is concerning for metastatic disease.     MRI cervical spine 9/12/21:  1. C4 vertebral tumor/metastasis extending into the right foramen, without  spinal stenosis. 2. Multilevel cervical degenerative disc disease. 3. No fracture or subluxation. CONTROLLED SUBSTANCES SAFETY ASSESSMENT (IF ON CONTROLLED SUBSTANCES):     Reviewed opioid safety handout:  [x] Yes   [] No  24 hour opioid dose >150mg morphine equivalent/day:  [] Yes   [x] No  Benzodiazepines:  [x] Yes   [] No  Sleep apnea:  [] Yes   [x] No  Urine Toxicology Testing within last 6 months:  [] Yes   [x] No  History of or new aberrant medication taking behaviors:  [] Yes   [] No  Has Narcan been prescribed [] Yes   [x] No          Total time: 60 minutes  Counseling / coordination time: 50 minutes  > 50% counseling / coordination?: yes- chart review; history;physical exam; symptom assessment and management options; exploration of personal goals and values; care plan communication with Palliative Medicine Team; documentation      .

## 2021-09-27 NOTE — PROGRESS NOTES
Palliative Medicine Office Visit  Palliative Medicine Nurse Check In  [phone]    Patient Name: Yonathan Byers  YOB: 1960      Date of Office Visit: 9/27/2021    Patient states: \"  \"    From Check In Sheet (scanned in Media):  Has a medical provider talked with you about cardiopulmonary resuscitation (CPR)? [] Yes   [] No   [] Unable to obtain    Nurse reminder to complete or update ACP FlowSheet:    Is ACP on the Problem List?    [] Yes    [] No  IF ACP Document is ON FILE; Nurse to place ACP on Problem List     Is there an ACP Note in Chart Review/Note? [] Yes    [] No   If NO: ALERT PROVIDER     . acp    Is there anything that we should know about you as a person in order to provide you the best care possible?          Functional status (describe):         Last BM: 2 days ago     accessed (date): 9/27/2021

## 2021-09-27 NOTE — PATIENT INSTRUCTIONS
Dear Ke Foote ,    It was a pleasure seeing you today in [address]. We will see you again in 2 weeks for a televisit. If labs or imaging tests have been ordered for you today, please call the office  at [phone] 48 hours after completion to obtain the results. Your described symptoms were: Fatigue: 8 Drowsiness: 0   Depression: 0 Pain: 6   Anxiety: 5 Nausea: 0   Anorexia: 0 Dyspnea: 4   [facility] Well[facility]in Constipation: Yes   Other Problem (Comment): 0       This is the plan we talked about:    1. Pain  -Start extended-release morphine 15-mg every 12 hours  -Continue oxycodone 5-mg: take 1 to 2 tabs every 4 as needed  -Continue tylenol 500-mg: take 2 tabs every 6 hours as needed    2. Fatigue  -Continue Xanax 1-mg at bedtime  -You may find you sleep throughout the night after starting extended-release morphine    3. Constipation  -Start senna-docusate sodium: take 1 to 2 tabs at bedtime  -Avoid Metamucil    4. Anxiety  -Continue Xanax 1-mg: taking 1/2 to 1 tab every 6 hours as needed  -Today we talked about adding an anxiolytic medication like Zoloft and  You're going to give this some thought    5. Lung cancer  -You're receiving Keytruda under the care of Dr. Colette Bone  -You start radiation therapy to your neck tomorrow    6.  Advance Care Planning/Goals-of-Care  -You receiving cancer-directed therapy with the goal of palliation of symptoms and prolongation of quality life  -Quality of life is more important to you than quantity of life and you make your health care decisions guided by this value  -You've completed your Advance Medical Directives, naming your friend, Grazyna Willson, who is a hospice nurse, as your primary medical decision-maker      This is what you have shared with us about Advance Care Planning:      Primary Decision Maker (Active): Kandi Mercedes - [phone]  Advance Care Planning 2021   Patient's [place] is: Named in scanned ACP document   Confirm Advance Directive Yes, on file   Patient Would Like to Complete Advance Directive -   Does the patient have other document types -           The Palliative Medicine Team is here to support you and your family. Sincerely,      Pepper Chino MD and the Palliative Medicine Team      OPIOID SAFETY HANDOUT    You are being prescribed a type of pain medication called an \"opioid\". This medication can be very effective. However, if not taken correctly, it can also be dangerous. Thus, there are necessary precautions for your safety and the safety of others. Following these will allow us to continue to safely prescribe this medication for your pain. Bring your original medication bottles to EVERY visit with the medication you have left in  them. We cannot give you a new prescription without verifying the previous prescription. Opioids can cause fatigue or sleepiness. **Do not take with other medications that make you sleepy. **Do not take with alcohol or other drugs. Opioids can cause constipation. **Take a combination stool softener/laxative while taking opioids. Only take your pain medication as prescribed on the bottle or instructions. Do not increase your pain medication dose without discussing it with your prescribing physician. If your pain becomes worse, please call Palliative Medicine at [phone]). Do not use the Emergency Department for your pain unless it is a life-threatening situation. Your pain medication was prescribed only for you. It is both unsafe and illegal to share or sell your pain medication. Do not drive within 72 hours of a change in dose or the addition of a new medication. Your pain medication should be kept in a locked container in your house. Only take out the number of pills you need for 24 hours. Keep all opioids (and other medications) away from a child's reach.      If you pill bottle is lost or stolen, we typically cannot write a new prescription for the same medication until the date it was supposed to be finished. In order to keep you safe, we access the [employer]. This allows us to make sure you are not prescribed a similar medication that could interact with the opioids we have prescribed. Additionally, every patient prescribed opioids for more than 3 months will be asked to give a urine sample for drug testing at least once. This is a necessary test as guided by the [facility] for patients who are prescribed opioid medications. To dispose of your unused medications safely visit: www.fda.gov and search \"unused medications. \"     Please inform Palliative Medicine of any questions you may have about your pain medications by call [phone]). We look forward to seeing you at the next office visit.     Fam Arenas MD

## 2021-10-07 NOTE — TELEPHONE ENCOUNTER
SW introduced palliative medicine social work as a regular part of the palliative medicine team. SW described several means of support including community resource referral, supportive counseling, advance care planning discussion, and advocacy. SW reinforced own availability and means of contact. Pt stated that at this time, she is feeling well physically and emotionally, stating in a humorous tone, \"the drugs work! \" She stated that she is currently working on her [employer] application which has been overwhelming but she feels she has a handle on it, it is just a lot of information she has to provide. LCSW offered reflective listening and encouragement today.      MARILYN Locke, [facility]    Home Based [address] [place] [address]  (e) [phone]

## 2021-10-07 NOTE — TELEPHONE ENCOUNTER
Patient came in and stated that she has about five days left of her Keppra and Xanax and would like them refilled if Dr Jo-Ann Ugarte would like her to continue them. She also left continuing disability forms that need to be filled out and sent to her insurance company.      If there are any questions, the patients CB # is [phone]

## 2021-10-07 NOTE — TELEPHONE ENCOUNTER
JACINTO Verified. Called pt on mobile phone number listed. Patient was made aware that her Keppra has been refilled and that she will need to contact the previous doctor who has been prescribing her Xanax for a refill on this. Patient verbalized understanding and expressed no further question!   Daniel Kin

## 2021-10-07 NOTE — TELEPHONE ENCOUNTER
Keppra refilled. She needs to get Xanax refill from whoever has been prescribing it to her previously. Please let her know.

## 2021-10-12 NOTE — PROGRESS NOTES
Palliative Medicine Outpatient Services  [facility]: [phone] ([phone])    Patient Name: Irvin Gomez  YOB: 1960    Date of Current Visit: 10/13/21  Location of Current Visit:    [] [facility] Office  [] [facility] Office  [] [address] [facility] Office  [] Home  [x]Synchronous real-time A/V virtual visit    Date of Initial Visit: 9/27/21  Referral from: Agatha De La Garza DO  Primary Care Physician: None      SUMMARY:   Irvin Gomez is a 63y.o. 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 Silvio Cifuentes for symptom management and psychosocial support. She was diagnosed in 7/2021, underwent surgical resection of left temporal mass 8/11/21. She is currently being treated with immune therapy Jeff Roca) and has been referred to Radiation-Oncology for C4 lesion. She is receiving cancer-directed therapy with the goal of palliation of symptoms and prolongation of quality life. Quality of life (rather than quantity) is important to her and guides her health care decisions. The patients social history includes: she is  and lives alone. She works as a respiratory therapist at [employer]. She has an adult daughter, Eli Martin, who has a 11year old [facility]. She also has a 24year old daughter, Kusum Louis, who lives in [place] and plans to attend veterinary school. She has a sister in [place], [place]. She has a sister in [facility], [facility] who has end stage MS, and an 80year old mother in [facility], [facility], who is 360 lbs, and currently on hospice. The patient takes care of her mother, and her mother's home, several days each week. She has a best friend, Emilie Cedillo, who works for [employer]. Palliative Medicine is addressing the following current patient/family concerns: symptoms related to metastatic carcinoma; support in care decisions consistent with her personal goals and values.     Initial Referral Intake note reviewed   PALLIATIVE DIAGNOSES:       ICD-10-CM ICD-9-CM    1. Abdominal pain, generalized  R10.84 789.07 morphine CR (MS CONTIN) 15 mg CR tablet   2. Flank pain  R10.9 789.09    3. Neck pain  M54.2 723.1 morphine CR (MS CONTIN) 15 mg CR tablet   4. Fatigue, unspecified type  R53.83 780.79    5. Chronic idiopathic constipation  K59.04 564.00    6. Anxiety  F41.9 300.00 ALPRAZolam (XANAX) 1 mg tablet   7. Metastatic carcinoma (HCC)  C79.9 199.1 morphine CR (MS CONTIN) 15 mg CR tablet          PLAN:   Patient Instructions     Dear Eden Moncada ,    It was a pleasure seeing you today via televisit. We will see you again in 4 weeks for a televisit. If labs or imaging tests have been ordered for you today, please call the office  at [phone] 48 hours after completion to obtain the results. Your described symptoms were: Fatigue: 8 Drowsiness: 5   Depression: 5 Pain: 4   Anxiety: 7 Nausea: 0   Anorexia: 0 Dyspnea: 5     Constipation: Yes   Other Problem (Comment): 0       This is the plan we talked about:    1. Pain  -Your pain is under much better control since you started extended-release morphine  -Since then, you've been taking about 2 oxycodone a day  -Continue extended-release morphine 15-mg every 12 hours  -Continue oxycodone 5-mg: take 1 to 2 tabs every 4 as needed  -Continue tylenol 500-mg: take 2 tabs every 6 hours as needed    2. Fatigue  -You continue to feel fatigued most of the time  -You sleep well at night with Xanax  -You've tried Ambien in the past and it was ineffective  -Dr. Jessie Max checked your labs last month: your thyroid function is normal and you are mildly anemic (hemoglobin 10)  -Fatigue is a common side effect of immune therapy    3.  Constipation  -You've been constipated for a few days  -You moved your bowels after increasing senna  -Continue senna-docusate sodium to 2 tabs at bedtime  -Continue daily gentle-lax (Miralax  -Today we talked about how to increase these medications as needed to avoid constipation    4. Anxiety  -You've been taking Xanax for years for your anxiety under the care of your gynecologist  -This has worked well for you  -Today we talked about the benefit of continuing Xanax for your anxiety as well as adding anxiolytic medication like Zoloft or Cymbalta   -We also talked about the synergistic effect between benzodiazepines (Xanax) and opioids (morphine, oxycodone) which requires that we work together closely to avoid potential adverse side-effects  -We decided to continue Xanax and to defer starting another medication for now  -I will prescribe Xanax for you  -Continue Xanax 1-mg twice daily as needed    5. Lung cancer  -You're receiving Keytruda under the care of Dr. Mercedez Hi  -Srinivasa Knott scheduled for radiation therapy to your neck on 10/19 and 10/21    6. Advance Care Planning/Goals-of-Care (we talked about this at your initial visit)  -You receiving cancer-directed therapy with the goal of palliation of symptoms and prolongation of quality life  -Quality of life is more important to you than quantity of life and you make your health care decisions guided by this value  -You've completed your Advance Medical Directives, naming your friend, Jeanette Johnson, who is a hospice nurse, as your primary medical decision-maker      This is what you have shared with us about Advance Care Planning:      Primary Decision Maker (Active): Annalee Ureña - [facility] 9/27/2021   Patient's [address] is: Named in scanned ACP document   Confirm Advance Directive Yes, on file   Patient Would Like to Complete Advance Directive -   Does the patient have other document types -           The Palliative Medicine Team is here to support you and your family.        Sincerely,      Betty Lisa MD and the Palliative Medicine Team           GOALS OF CARE / TREATMENT PREFERENCES:   [====Goals of Care====]  GOALS OF CARE:  Patient / health care proxy stated goals: See Patient Instructions / Summary    TREATMENT PREFERENCES:   Code Status:  [x] Attempt Resuscitation       [] Do Not Attempt Resuscitation    Advance Care Planning:  [x] The Pall Med Interdisciplinary Team has updated the ACP Navigator with Decision Maker and Patient Capacity      Primary Decision Maker (Active): Ngozi Lomax - [phone]  Advance Care Planning 9/27/2021   Patient's Healthcare Decision Maker is: Named in scanned ACP document   Confirm Advance Directive Yes, on file   Patient Would Like to Complete Advance Directive -   Does the patient have other document types -       Other:  (If patient appropriate for POST, consider using PALLPOST smart phrase here)    The palliative care team has discussed with patient / health care proxy about goals of care / treatment preferences for patient.  [====Goals of Care====]     PRESCRIPTIONS GIVEN:     Medications Ordered Today   Medications    ALPRAZolam (XANAX) 1 mg tablet     Sig: TAKE 1 TABLET BY MOUTH EVERY 4 HOURS AS NEEDED FOR ANXIETY     Dispense:  60 Tablet     Refill:  0    morphine CR (MS CONTIN) 15 mg CR tablet     Sig: Take 1 Tablet by mouth every twelve (12) hours for 30 days. Max Daily Amount: 30 mg. Dispense:  60 Tablet     Refill:  0    naloxone (Narcan) 4 mg/actuation nasal spray     Sig: Use 1 spray intranasally, then discard. Repeat with new spray every 2 min as needed for opioid overdose symptoms, alternating nostrils.      Dispense:  1 Each     Refill:  0           FOLLOW UP:     Future Appointments   Date Time Provider Lakeshia Campbelli   10/28/2021 11:00 AM B[facility] [facility]   10/28/2021 11:15 AM Ryann Adamson  [address] BS AMB   12/9/2021 11:00 AM B3 J[facility]ZY  [address]    1/20/2022 11:00 AM B3 [facility]            PHYSICIANS INVOLVED IN CARE:   Patient Care Team:  None as PCP - General Boris Garcia DO (Hematology and Oncology)  Tigre Junior RN as Benefits Care Manager  Praneeth Ross MD as Physician (Palliative Medicine)  Praneeth Ross MD as Physician (Palliative Medicine)       HISTORY:   Reviewed patient-completed ESAS and advance care planning form. Reviewed patient record in prescription monitoring program.    CHIEF COMPLAINT:   Chief Complaint   Patient presents with    Abdominal Pain       HPI/SUBJECTIVE:    The patient is: [x] Verbal / [] Nonverbal      See Plan/Patient Instructions for interval history    From  9/27/2021:  She had a headache and pain in her right chest when she returned from a Trip to [facility] last July. She had a chest X-ray which showed the mass in her lung, then had a CT scan which showed the brain mass. She had gamma knife (Dr. Aquilino Carvalho), this helped with the headache. She started [place] about 2 weeks ago. Since then, she's had more pain in her right chest and in her abdomen \"where the cancer is. \"  She's also been having neck pain and a burning pain from her back, under her right shoulder blade. She's been taking 1 to 2 oxycodone ~ 4 times a day which helps but wears off after 4 to 4.5 hours. She's also been taking tylenol. .  She's going to start radiation therapy tomorrow for the lesion in her neck. She feels very fatigued this morning. She takes care of her grandson, Alicia Espinosa, 3 days a week while her daughter works. He spends the night with her, too. She also cares for her 80-year old mother, who lives in her own home with hospice support. She goes to her mother's house every morning to help her bath, does the laundry. She hasn't been sleeping well since her cancer diagnosis. She takes Xanax at bedtime which helps her go to sleep, then takes melatonin when she wakes up in the middle of the night. She usually goes back to sleep after taking melatonin.   She tried Ambien in the past and it made her feel too sedated the following day, interfered with her work.    Her appetite is good. She's had trouble with her bowels all of her life. She goes between constipation and diarrhea. She's never seen a doctor for this. She usually takes Metamucil when she gets constipated. She hasn't noticed any difference in her bowels since she started taking oxycodone. She's been taking Xanax for 15 years for \"anger\" related to family issues. She's used it from time-to-time in the past, more since her cancer diagnosis. She usually takes 1/2 tab during the day if she needs it. She's never taken any other medications for anxiety or depression. She wouldn't have had gamma knife but she needed to get her affairs in order. She works as a respiratory therapist in the ICU at [employer] and she's seen how people can get. She doesn't want to be like that. She now has a will and made plans for her cremation. She had a AMD; her friend, Martina Argueta, is a hospice RN and is her decision-maker.     Clinical Pain Assessment (nonverbal scale for nonverbal patients):   [++++ Clinical Pain Assessment++++]  [++++Pain Severity++++]: Pain: 4  [++++Pain Character++++]: burning under shoulder blade; sharp, knife-like other areas  [++++Pain Duration++++]: since August 2021  [++++Pain Effect++++]:  [++++Pain Factors++++]: oxycodone helps; wearing abdominal binder helps  [++++Pain Frequency++++]: constant with varying intensity  [++++Pain Location++++]: neck; under right shoulder blade; upper quadrant abdomen; right lower flank  [++++ Clinical Pain Assessment++++]       FUNCTIONAL ASSESSMENT:     Palliative Performance Scale (PPS):  PPS: 70       PSYCHOSOCIAL/SPIRITUAL SCREENING:     Any spiritual / Hoahaoism concerns:  [] Yes /  [x] No    Caregiver Burnout:  [] Yes /  [] No /  [x] No Caregiver Present      Anticipatory grief assessment:   [x] Normal  / [] Maladaptive       ESAS Anxiety: Anxiety: 7    ESAS Depression: Depression: 5       REVIEW OF SYSTEMS:     The following systems were [x] reviewed / [] unable to be reviewed  Systems: constitutional, ears/nose/mouth/throat, respiratory, gastrointestinal, genitourinary, musculoskeletal, integumentary, neurologic, psychiatric, endocrine. Positive findings noted below. Modified ESAS Completed by: provider   Fatigue: 8 Drowsiness: 5   Depression: 5 Pain: 4   Anxiety: 7 Nausea: 0   Anorexia: 0 Dyspnea: 5     Constipation: Yes   Other Problem (Comment): 0          PHYSICAL EXAM:     Wt Readings from Last 3 Encounters:   09/16/21 158 lb (71.7 kg)   08/19/21 157 lb 3.2 oz (71.3 kg)   08/16/21 154 lb (69.9 kg)     There were no vitals taken for this visit. Last bowel movement: See Nursing Note    Constitutional    [x] Appears well-developed and well-nourished in no apparent distress    [] Abnormal:  Mental status  [x] Alert and awake  [x] Oriented to person/place/time  [x] Able to follow commands  [] Abnormal:   Eyes  [x] EOM normal   [x] Sclera normal   [x] No visible ocular discharge  [] Abnormal:   HENT  [x] Normocephalic, atraumatic  [x] Mouth/Throat: Moist mucous membranes   [x] External Ears normal  [] Abnormal:  Neck  [x] No visualized mass  [] Abnormal:  Pulmonary/Chest   [x] Respiratory effort normal  [x] No visualized signs of difficulty breathing or respiratory distress  [] Abnormal:  Musculoskeletal  [x] Normal gait with no signs of ataxia  [x] Normal range of motion of neck  [] Abnormal:  Neurological:   [x] No facial asymmetry (Cranial nerve 7 motor function)  [x] No gaze palsy  [] Abnormal:   Skin  [x] No significant exanthematous lesions or discoloration noted on facial skin  [] Abnormal:                                  Psychiatric  [x] Normal affect  [x] No hallucinations  [] Abnormal:    Other pertinent observable physical exam findings:    Due to this being a TeleHealth evaluation, many elements of the physical examination are unable to be assessed.              HISTORY:     Past Medical History:   Diagnosis Date    Cancer [facility]) 2021    LUNGS AND BRAIN AND LYMPH NODES    Hypercholesterolemia     Metastatic carcinoma ([facility] [address] 8/19/2021      Past Surgical History:   Procedure Laterality Date    HX BREAST AUGMENTATION      HX HERNIA REPAIR      IMPLANT BREAST SILICONE/EQ Bilateral 6166    sub-pectoral      Family History   Problem Relation Age of Onset    Depression Mother     Obesity Mother     Heart Disease Mother     MS Sister         END STAGE    Cancer Brother         TONSIL, WITH METS TO LIVER AND BONES    Heart Disease Maternal Grandfather     Cancer Paternal Grandmother     Anesth Problems Neg Hx       History reviewed, no pertinent family history. Social History     Tobacco Use    Smoking status: Current Every Day Smoker     Packs/day: 0.75     Years: 40.00     Pack years: 30.00    Smokeless tobacco: Never Used   Substance Use Topics    Alcohol use: Yes     Alcohol/week: 3.0 standard drinks     Types: 3 Cans of beer per week     Comment: socially     No Known Allergies   Current Outpatient Medications   Medication Sig    ALPRAZolam (XANAX) 1 mg tablet TAKE 1 TABLET BY MOUTH EVERY 4 HOURS AS NEEDED FOR ANXIETY    senna (Senna) 8.6 mg tablet Take 2 Tablets by mouth daily.  OTHER Gentle lax    [START ON 10/26/2021] morphine CR (MS CONTIN) 15 mg CR tablet Take 1 Tablet by mouth every twelve (12) hours for 30 days. Max Daily Amount: 30 mg.    naloxone (Narcan) 4 mg/actuation nasal spray Use 1 spray intranasally, then discard. Repeat with new spray every 2 min as needed for opioid overdose symptoms, alternating nostrils.  levETIRAcetam (KEPPRA) 500 mg tablet Take 1 Tablet by mouth two (2) times a day.  polyethylene glycol (Miralax) 17 gram/dose powder Take 17 g by mouth as needed for Constipation.  simethicone (GAS-X) 125 mg capsule Take 125 mg by mouth daily as needed for Flatulence.  oxyCODONE IR (ROXICODONE) 5 mg immediate release tablet Take 1 Tablet by mouth every four (4) hours as needed for Pain for up to 20 days.  Max Daily Amount: 30 mg.    acetaminophen (TYLENOL) 325 mg tablet Take 2 Tablets by mouth every six (6) hours as needed for Pain.  tiotropium (Spiriva with HandiHaler) 18 mcg inhalation capsule Take 1 Capsule by inhalation daily.  medroxyPROGESTERone (Provera) 5 mg tablet Take 5 mg by mouth daily.  rosuvastatin (CRESTOR) 10 mg tablet TAKE 1 TABLET BY MOUTH EVERY DAY    dexAMETHasone (DECADRON) 4 mg tablet Take 1 tab PO every 12 hours tonight then 1/2 a tab PO every 12 hours x 2 days then 1/2 a tab daily x 2 days then stop. (Patient not taking: Reported on 9/27/2021)    varenicline (Chantix) 1 mg tablet Take 1 mg by mouth two (2) times daily (after meals). (Patient not taking: Reported on 9/27/2021)    estradioL (Estrace) 0.5 mg tablet Take 0.5 mg by mouth daily. (Patient not taking: Reported on 10/13/2021)     No current facility-administered medications for this visit. LAB DATA REVIEWED:     Lab Results   Component Value Date/Time    WBC 14.9 (H) 09/16/2021 11:02 AM    HGB 10.9 (L) 09/16/2021 11:02 AM    PLATELET 969 (H) 36/23/4624 11:02 AM     Lab Results   Component Value Date/Time    Sodium 134 (L) 09/16/2021 11:02 AM    Potassium 3.9 09/16/2021 11:02 AM    Chloride 103 09/16/2021 11:02 AM    CO2 23 09/16/2021 11:02 AM    BUN 7 09/16/2021 11:02 AM    Creatinine 0.78 09/16/2021 11:02 AM    Calcium 9.4 09/16/2021 11:02 AM      Lab Results   Component Value Date/Time    Alk. phosphatase 117 09/16/2021 11:02 AM    Protein, total 7.8 09/16/2021 11:02 AM    Albumin 3.1 (L) 09/16/2021 11:02 AM    Globulin 4.7 (H) 09/16/2021 11:02 AM     No results found for: INR, PTMR, PTP, PT1, PT2, APTT, INREXT, INREXT   No results found for: IRON, FE, TIBC, IBCT, PSAT, FERR     CT chest 7/23/21:  Spiculated right upper lobe mass with positive right hilar and mediastinal  adenopathy. Probable hepatic and possible left adrenal metastases. Recommend  PET/CT.     MRI brain 8/3/21:  1. 2.5 cm left temporal lobe mass with subacute hemorrhage. Considering  patient's history this is most likely metastasis. 2. Mild nonspecific white matter T2 hyperintensity may be related to mild  chronic small vessel ischemic change. PET-CT 1/70/68:  Hypermetabolic mass lesion right upper lobe with hypermetabolic  mediastinal and right hilar lymphadenopathy. Hypermetabolic right adrenal mass  is concerning for metastatic disease.     MRI cervical spine 9/12/21:  1. C4 vertebral tumor/metastasis extending into the right foramen, without  spinal stenosis. 2. Multilevel cervical degenerative disc disease. 3. No fracture or subluxation. CONTROLLED SUBSTANCES SAFETY ASSESSMENT (IF ON CONTROLLED SUBSTANCES):     Reviewed opioid safety handout:  [x] Yes   [] No  24 hour opioid dose >150mg morphine equivalent/day:  [] Yes   [x] No  Benzodiazepines:  [x] Yes   [] No  Sleep apnea:  [] Yes   [x] No  Urine Toxicology Testing within last 6 months:  [] Yes   [x] No  History of or new aberrant medication taking behaviors:  [] Yes   [] No  Has Narcan been prescribed [] Yes   [x] No          Total time:   Counseling / coordination time:   > 50% counseling / coordination?:     Consent:  He and/or health care decision maker is aware that that he may receive a bill for this telehealth service, depending on his insurance coverage, and has provided verbal consent to proceed: Yes    CPT Codes 69048-82835 for Established Patients may apply to this Telehealth Visit    Pursuant to the emergency declaration under the [facility], [facility] waiver authority and the [employer] and [employer] Act, this Virtual  Visit was conducted, with patient's consent, to reduce the patient's risk of exposure to COVID-19 and provide continuity of care for an established patient. Services were provided through a video synchronous discussion virtually to substitute for in-person clinic visit.

## 2021-10-12 NOTE — TELEPHONE ENCOUNTER
Patient is requesting a refill of her Xanax and also has a question regarding what she needs to be taking to help with constipation. Advised that nurse would call her back to discuss.

## 2021-10-12 NOTE — TELEPHONE ENCOUNTER
Palliative Medicine  Nursing Note  [phone]  Fax [phone]      Telephone Call  Patient Name: Arnaldo Garcia  YOB: 1960    10/12/2021        Primary Decision Maker (Active): John Tobin - [phone]   Advance Care Planning 9/27/2021   Patient's Healthcare Decision Maker is: Named in scanned ACP document   Confirm Advance Directive Yes, on file   Patient Would Like to Complete Advance Directive -   Does the patient have other document types -     10:21am- Call returned to Ms. Fonseca and left voice message to please call back to Palliative at her convenience. 3:40pm- Incoming call from Ms. Fonseca. She shared that she feels constipated. Her last BM was 4 days ago. She is passing a lot of gas and has been using Simethicone daily to ease the pains. She has been taking Senna 2 tabs each night and took 1 Duralax yesterday. She has not been taking her Gentle lax which is polyethylene glycol. She is able to eat and drink without difficulty, but is feeling a bit distended and would like to have a bowel movement. Discussed taking a dose of the Gentle Lax and 2 tabs Senna tonight. Tomorrow morning, take another dose of Gentle lax and 2 more Senna. If no bowel movement by tomorrow afternoon instructed her call Palliative. She is requesting a refill on her Xanax 1mg. She has 4 tabs left. Her GYN- Dr. Alver Klinefelter,  has been prescribing it, but she hasn't seen him in a year she says. She states that she is ok with Dr. Aura Calderón changing it to something different if she wants to. She has an appt with Dr. Aura Calderón tomorrow at 1:30pm and instructed to discuss this with her at that time. She verbalized understanding of above and was appreciative.        Byron Marin RN  Palliative Medicine

## 2021-10-13 NOTE — PROGRESS NOTES
Palliative Medicine Office Visit  Palliative Medicine Nurse Check In  [phone]    Patient Name: Nathan Archuleta  YOB: 1960      Date of Office Visit: 10/13/2021    Patient states: \" what like thoughts on upcoming radiation appt  \"    From Check In Sheet (scanned in Media):  Has a medical provider talked with you about cardiopulmonary resuscitation (CPR)? [x] Yes   [] No   [] Unable to obtain    Nurse reminder to complete or update ACP FlowSheet:    Is ACP on the Problem List?    [x] Yes    [] No  IF ACP Document is ON FILE; Nurse to place ACP on Problem List     Is there an ACP Note in Chart Review/Note? [] Yes    [x] No   If NO: [address] 9/27/2021   Patient's Healthcare Decision Maker is: Named in scanned ACP document   Confirm Advance Directive Yes, on file   Patient Would Like to Complete Advance Directive -   Does the patient have other document types -       Is there anything that we should know about you as a person in order to provide you the best care possible? Have you been to the ER, urgent care clinic since your last visit? [] Yes   [x] No   [] Unable to obtain    Have you been hospitalized since your last visit? [] Yes   [x] No   [] Unable to obtain    Have you seen or consulted any other health care providers outside of the [address] [place] [address] since your last visit?    [] Yes   [x] No   [] Unable to obtain    Functional status (describe):   Last BM: 10/12/2021       accessed (date): 10/13/2021    Bottle review (for opioid pain medication):  Medication 1:   Date filled:   Directions:   # filled:   # left:   # pills taking per day:  Last dose taken:    Medication 2:   Date filled:   Directions:   # filled:   # left:   # pills taking per day:  Last dose taken:    Medication 3:   Date filled:   Directions:   # filled:   # left:   # pills taking per day:  Last dose taken:    Medication 4:   Date filled:   Directions:   # filled: # left:   # pills taking per day:  Last dose taken:

## 2021-10-13 NOTE — PATIENT INSTRUCTIONS
Dear Víctor Wagner ,    It was a pleasure seeing you today via televisit. We will see you again in 4 weeks for a televisit. If labs or imaging tests have been ordered for you today, please call the office  at [phone] 48 hours after completion to obtain the results. Your described symptoms were: Fatigue: 8 Drowsiness: 5   Depression: 5 Pain: 4   Anxiety: 7 Nausea: 0   Anorexia: 0 Dyspnea: 5     Constipation: Yes   Other Problem (Comment): 0       This is the plan we talked about:    1. Pain  -Your pain is under much better control since you started extended-release morphine  -Since then, you've been taking about 2 oxycodone a day  -Continue extended-release morphine 15-mg every 12 hours  -Continue oxycodone 5-mg: take 1 to 2 tabs every 4 as needed  -Continue tylenol 500-mg: take 2 tabs every 6 hours as needed    2. Fatigue  -You continue to feel fatigued most of the time  -You sleep well at night with Xanax  -You've tried Ambien in the past and it was ineffective  -Dr. Sunita Mendez checked your labs last month: your thyroid function is normal and you are mildly anemic (hemoglobin 10)  -Fatigue is a common side effect of immune therapy    3. Constipation  -You've been constipated for a few days  -You moved your bowels after increasing senna  -Continue senna-docusate sodium to 2 tabs at bedtime  -Continue daily gentle-lax (Miralax  -Today we talked about how to increase these medications as needed to avoid constipation    4.  Anxiety  -You've been taking Xanax for years for your anxiety under the care of your gynecologist  -This has worked well for you  -Today we talked about the benefit of continuing Xanax for your anxiety as well as adding anxiolytic medication like Zoloft or Cymbalta   -We also talked about the synergistic effect between benzodiazepines (Xanax) and opioids (morphine, oxycodone) which requires that we work together closely to avoid potential adverse side-effects  -We decided to continue Xanax and to defer starting another medication for now  -I will prescribe Xanax for you  -Continue Xanax 1-mg twice daily as needed    5. Lung cancer  -You're receiving Keytruda under the care of Dr. Constance Mar scheduled for radiation therapy to your neck on 10/19 and 10/21    6. Advance Care Planning/Goals-of-Care (we talked about this at your initial visit)  -You receiving cancer-directed therapy with the goal of palliation of symptoms and prolongation of quality life  -Quality of life is more important to you than quantity of life and you make your health care decisions guided by this value  -You've completed your Advance Medical Directives, naming your friend, Fly Decker, who is a hospice nurse, as your primary medical decision-maker      This is what you have shared with us about Advance Care Planning:      Primary Decision Maker (Active): Ranell Crigler - [address] 9/27/2021   Patient's [employer] is: Named in scanned ACP document   Confirm Advance Directive Yes, on file   Patient Would Like to Complete Advance Directive -   Does the patient have other document types -           The Palliative Medicine Team is here to support you and your family.        Sincerely,      Helen Vega MD and the Palliative Medicine Team

## 2021-10-15 NOTE — TELEPHONE ENCOUNTER
The patient is f/u on status of Xanax refill. She has not received the text from [address] saying it was ready.

## 2021-10-21 PROBLEM — C34.11 MALIGNANT NEOPLASM OF UPPER LOBE OF RIGHT LUNG (HCC): Status: ACTIVE | Noted: 2021-01-01

## 2021-10-21 PROBLEM — C79.51 BONE METASTASIS (HCC): Status: ACTIVE | Noted: 2021-01-01

## 2021-10-28 PROBLEM — F41.9 CHRONIC ANXIETY: Status: ACTIVE | Noted: 2021-01-01

## 2021-10-28 PROBLEM — C79.51 BONE METASTASES (HCC): Status: ACTIVE | Noted: 2021-01-01

## 2021-10-28 PROBLEM — R68.83 CHILLS: Status: ACTIVE | Noted: 2021-01-01

## 2021-10-28 PROBLEM — G89.3 CANCER RELATED PAIN: Status: ACTIVE | Noted: 2021-01-01

## 2021-10-28 PROBLEM — R53.0 NEOPLASTIC MALIGNANT RELATED FATIGUE: Status: ACTIVE | Noted: 2021-01-01

## 2021-10-28 PROBLEM — D64.9 ANEMIA: Status: ACTIVE | Noted: 2021-01-01

## 2021-10-28 PROBLEM — Z51.12 ENCOUNTER FOR ANTINEOPLASTIC IMMUNOTHERAPY: Status: ACTIVE | Noted: 2021-01-01

## 2021-10-28 PROBLEM — C34.90 METASTATIC NON-SMALL CELL LUNG CANCER (HCC): Status: ACTIVE | Noted: 2021-01-01

## 2021-10-28 PROBLEM — Z87.898 HISTORY OF HEADACHE: Status: ACTIVE | Noted: 2021-01-01

## 2021-10-28 PROBLEM — C79.31 BRAIN METASTASES (HCC): Status: ACTIVE | Noted: 2021-01-01

## 2021-10-28 PROBLEM — D75.839 THROMBOCYTOSIS: Status: ACTIVE | Noted: 2021-01-01

## 2021-10-28 PROBLEM — F17.200 SMOKER: Status: ACTIVE | Noted: 2021-01-01

## 2021-10-28 NOTE — PROGRESS NOTES
\[facility]\"" Progress Note    Date: 2021    Name: Praveen Oliva    MRN: [id number]         : 1960    Ms. Fonseca Arrived ambulatory and in no distress for C2D1 of Keytruda Regimen. Assessment was completed, no acute issues at this time, no new complaints voiced. Peripheral IV 24 g established in left AC with positive blood return. Labs obtained and sent for processing. Results are within parameters to treat. Chemotherapy Flowsheet 10/28/2021   Cycle C2   Date 10/28/2021   Drug / Regimen Keytruda   Notes -       Patient proceed to MD appointment. Patient returned with no change in treatment. Ms. Cyn Diaz vitals were reviewed. Visit Vitals  BP 99/60 (BP 1 Location: Right upper arm, BP Patient Position: Sitting)   Pulse 90   Temp 97.5 °F (36.4 °C)   Resp 18   Ht 5' 6\" (1.676 m)   SpO2 100%   Breastfeeding No   BMI 25.50 kg/m²     Patient Vitals for the past 12 hrs:   Temp Pulse Resp BP SpO2   10/28/21 1522  95 18 (!) 101/56    10/28/21 1054 97.5 °F (36.4 °C) 90 18 99/60 100 %       Lab results were obtained and reviewed. Recent Results (from the past 12 hour(s))   CBC WITH AUTOMATED DIFF    Collection Time: 10/28/21 11:08 AM   Result Value Ref Range    WBC 17.7 (H) 3.6 - 11.0 K/uL    RBC 3.23 (L) 3.80 - 5.20 M/uL    HGB 8.9 (L) 11.5 - 16.0 g/dL    HCT 28.5 (L) 35.0 - 47.0 %    MCV 88.2 80.0 - 99.0 FL    MCH 27.6 26.0 - 34.0 PG    MCHC 31.2 30.0 - 36.5 g/dL    RDW 15.2 (H) 11.5 - 14.5 %    PLATELET 763 (H) 840 - 400 K/uL    MPV 11.0 8.9 - 12.9 FL    NRBC 0.0 0  WBC    ABSOLUTE NRBC 0.00 0.00 - 0.01 K/uL    NEUTROPHILS 79 (H) 32 - 75 %    LYMPHOCYTES 13 12 - 49 %    MONOCYTES 5 5 - 13 %    EOSINOPHILS 2 0 - 7 %    BASOPHILS 0 0 - 1 %    IMMATURE GRANULOCYTES 1 (H) 0.0 - 0.5 %    ABS. NEUTROPHILS 13.9 (H) 1.8 - 8.0 K/UL    ABS. LYMPHOCYTES 2.3 0.8 - 3.5 K/UL    ABS. MONOCYTES 0.9 0.0 - 1.0 K/UL    ABS. EOSINOPHILS 0.4 0.0 - 0.4 K/UL    ABS. BASOPHILS 0.0 0.0 - 0.1 K/UL    ABS. IMM.  GRANS. 0.2 (H) 0.00 - 0.04 K/UL    DF SMEAR SCANNED      RBC COMMENTS ANISOCYTOSIS  1+       METABOLIC PANEL, COMPREHENSIVE    Collection Time: 10/28/21 11:08 AM   Result Value Ref Range    Sodium 132 (L) 136 - 145 mmol/L    Potassium 4.1 3.5 - 5.1 mmol/L    Chloride 103 97 - 108 mmol/L    CO2 22 21 - 32 mmol/L    Anion gap 7 5 - 15 mmol/L    Glucose 106 (H) 65 - 100 mg/dL    BUN 8 6 - 20 MG/DL    Creatinine 0.71 0.55 - 1.02 MG/DL    BUN/Creatinine ratio 11 (L) 12 - 20      GFR est AA >60 >60 ml/min/1.73m2    GFR est non-AA >60 >60 ml/min/1.73m2    Calcium 9.6 8.5 - 10.1 MG/DL    Bilirubin, total 0.2 0.2 - 1.0 MG/DL    ALT (SGPT) 57 12 - 78 U/L    AST (SGOT) 29 15 - 37 U/L    Alk. phosphatase 329 (H) 45 - 117 U/L    Protein, total 8.9 (H) 6.4 - 8.2 g/dL    Albumin 2.5 (L) 3.5 - 5.0 g/dL    Globulin 6.4 (H) 2.0 - 4.0 g/dL    A-G Ratio 0.4 (L) 1.1 - 2.2     TSH 3RD GENERATION    Collection Time: 10/28/21 11:08 AM   Result Value Ref Range    TSH 1.02 0.36 - 3.74 uIU/mL       Medications:  Medications Administered     0.9% sodium chloride infusion     Admin Date  10/28/2021 Action  New Bag Dose  25 mL/hr Rate  25 mL/hr Route  IntraVENous Administered By  Jodie Arreguin RN          pembrolizumab [facility]) 400 mg in 0.9% sodium chloride 100 mL, overfill volume 10 mL IVPB     Admin Date  10/28/2021 Action  New Bag Dose  400 mg Rate  252 mL/hr Route  IntraVENous Administered By  Jodie Arreguin RN                Ms. Lo Harris tolerated treatment well and was discharged from [place] [address] in stable condition at 1525. PIV taken out with no issues, pressure applied, wrapped in 2x2 gauze and coban.  She is to return on     Future Appointments   Date Time Provider Lakeshia Valencia   11/10/2021 12:30 PM Kvng Dai MD [address] [facility] BS AMB   12/9/2021 11:00 AM B3 JERZY MED [place] [facility]    1/20/2022 11:00 AM B3 JERZY  Michael . [facility]

## 2021-10-28 NOTE — PROGRESS NOTES
Chencho Meza is a 63 y.o. female  Chief Complaint   Patient presents with    Chemotherapy     metastatic cancer likely lung primary     1. Have you been to the ER, urgent care clinic since your last visit? Hospitalized since your last visit? No.    2. Have you seen or consulted any other health care providers outside of the [address] [place] [address] since your last visit? Include any pap smears or colon screening. No.    Patient states she needs a refill on Morphine and Spiriva.

## 2021-10-28 NOTE — PROGRESS NOTES
[facility] [place] [address] [address], [address], [place]: [phone]  F: [phone]    Reason for Visit:   Chiquita Ozuna is a 63 y.o. female seen today in office for follow up of Metastatic Cancer likely Lung Primary    Treatment History:   · CT Chest 7/23/21: Spiculated right upper lobe mass with positive right hilar and mediastinal adenopathy. Probable hepatic and possible left adrenal metastases. Recommend PET/CT  · CT Chest 7/30/21: Right upper lobe pulmonary nodule with mediastinal and right hilar lymphadenopathy, highly suspicious for primary pulmonary neoplasm. 2 mm right middle lobe pulmonary nodule. Suspicious masses in the right adrenal gland and liver  · Brain MRI 8/3/21: 2.5 cm left temporal lobe mass with subacute hemorrhage. Considering patient's history this is most likely metastasis. Mild nonspecific white matter T2 hyperintensity may be related to mild chronic small vessel ischemic change  · CT Head 8/11/21: 2.5 cm left temporal lobe enhancing mass  · 8/11/21 PROCEDURE PERFORMED:  BrainLAB-guided stereotactic craniotomy resection of left temporal hemorrhagic metastatic brain tumor, use of operating microscope and stereotaxy  · CT Head 8/12/21: Recent left temporoparietal craniotomy with tumor resection. No operative complications  · PET 6/72/78: Hypermetabolic mass lesion right upper lobe with hypermetabolic mediastinal and right hilar lymphadenopathy. Hypermetabolic right adrenal mass is concerning for metastatic disease  · Mammogram 8/27/21: No mammographic evidence of malignancy  · Gamma Knife with Dr. Ana Da Silva 9/9/21  · MRI C Spine 9/12/21: C4 vertebral tumor/metastasis extending into the right foramen, without spinal stenosis. Multilevel cervical degenerative disc disease.  No fracture or subluxation  · Pembro 9/16/21 - Current  · XRT to C4 done 10/21/21    STAGE: 4 with brain mets    History of Present Illness:   Chiquita Ozuna is a 63 y.o. 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 She is here today for Cycle 2 of Keytruda. She reports that she feels well overall today. She tolerated first cycle of Keytruda well overall with fatigue and occasional chills. Her appetite is good. She continues to have mid back pain. She is taking Oxycodone and Morphine per Palliative Care. She has occasional headaches but not bad. She continues to smoke but has cut back significantly and is using Nicotine gum. She denies fever, mouth sores, cough, SOB, CP, nausea, vomiting, diarrhea, and constipation. CBC, CMP and TSH are still pending today. She wants treatment today. She is here alone today. FamHx:  Brother cancer H+N cancer    Past Medical History:   Diagnosis Date    Cancer [facility]) 2021    LUNGS AND BRAIN AND LYMPH NODES    Hypercholesterolemia     Metastatic carcinoma ([facility] [address] 8/19/2021      Past Surgical History:   Procedure Laterality Date    HX BREAST AUGMENTATION      HX HERNIA REPAIR      IMPLANT BREAST SILICONE/EQ Bilateral 0903    sub-pectoral      Social History     Tobacco Use    Smoking status: Current Every Day Smoker     Packs/day: 0.75     Years: 40.00     Pack years: 30.00    Smokeless tobacco: Never Used   Substance Use Topics    Alcohol use: Yes     Alcohol/week: 3.0 standard drinks     Types: 3 Cans of beer per week     Comment: socially      Family History   Problem Relation Age of Onset    Depression Mother     Obesity Mother     Heart Disease Mother     MS Sister         END STAGE    Cancer Brother         TONSIL, WITH METS TO LIVER AND BONES    Heart Disease Maternal Grandfather     Cancer Paternal Grandmother     Anesth Problems Neg Hx      Current Outpatient Medications   Medication Sig    ALPRAZolam (XANAX) 1 mg tablet TAKE 1 TABLET BY MOUTH EVERY 4 HOURS AS NEEDED FOR ANXIETY    senna (Senna) 8.6 mg tablet Take 2 Tablets by mouth daily.     OTHER Gentle lax    morphine CR (MS CONTIN) 15 mg CR tablet Take 1 Tablet by mouth every twelve (12) hours for 30 days. Max Daily Amount: 30 mg.    naloxone (Narcan) 4 mg/actuation nasal spray Use 1 spray intranasally, then discard. Repeat with new spray every 2 min as needed for opioid overdose symptoms, alternating nostrils.  levETIRAcetam (KEPPRA) 500 mg tablet Take 1 Tablet by mouth two (2) times a day.  polyethylene glycol (Miralax) 17 gram/dose powder Take 17 g by mouth as needed for Constipation.  simethicone (GAS-X) 125 mg capsule Take 125 mg by mouth daily as needed for Flatulence.  acetaminophen (TYLENOL) 325 mg tablet Take 2 Tablets by mouth every six (6) hours as needed for Pain.  tiotropium (Spiriva with HandiHaler) 18 mcg inhalation capsule Take 1 Capsule by inhalation daily.  medroxyPROGESTERone (Provera) 5 mg tablet Take 5 mg by mouth daily.  rosuvastatin (CRESTOR) 10 mg tablet TAKE 1 TABLET BY MOUTH EVERY DAY    dexAMETHasone (DECADRON) 4 mg tablet Take 1 tab PO every 12 hours tonight then 1/2 a tab PO every 12 hours x 2 days then 1/2 a tab daily x 2 days then stop. (Patient not taking: Reported on 9/27/2021)    varenicline (Chantix) 1 mg tablet Take 1 mg by mouth two (2) times daily (after meals). (Patient not taking: Reported on 9/27/2021)    estradioL (Estrace) 0.5 mg tablet Take 0.5 mg by mouth daily. (Patient not taking: Reported on 10/13/2021)     No current facility-administered medications for this visit. No Known Allergies     Review of Systems:  A complete review of systems was obtained, negative except as described above and as reported on ROS sheet scanned into system.      Physical Exam:     Visit Vitals  BP 99/60 (BP 1 Location: Left upper arm, BP Patient Position: Sitting)   Pulse 96   Temp 97.5 °F (36.4 °C) (Temporal)   Ht 5' 6\" (1.676 m)   Wt 154 lb 6.4 oz (70 kg)   SpO2 98%   BMI 24.92 kg/m²     ECOG PS:  1  General: No distress  Eyes: Anicteric sclerae  HENT: Atraumatic, wearing a mask  Neck: Supple  Respiratory: CTAB, normal respiratory effort  CV: Normal rate, regular rhythm, no murmurs, no peripheral edema  GI: Soft, nontender, nondistended, no masses  MS: Normal gait and station. Digits without clubbing or cyanosis. Skin: No rashes, ecchymoses, or petechiae. Normal temperature, turgor, and texture. Psych: Alert, oriented, appropriate affect, normal judgment/insight  Breast: Not examined today. Prior Visit: No masses b/l, implants b/l   Neuro: Non focal    Results:     Lab Results   Component Value Date/Time    WBC 17.7 (H) 10/28/2021 11:08 AM    HGB 8.9 (L) 10/28/2021 11:08 AM    HCT 28.5 (L) 10/28/2021 11:08 AM    PLATELET 417 (H) 78/52/1196 11:08 AM    MCV 88.2 10/28/2021 11:08 AM    ABS. NEUTROPHILS 13.9 (H) 10/28/2021 11:08 AM     Lab Results   Component Value Date/Time    Sodium 132 (L) 10/28/2021 11:08 AM    Potassium 4.1 10/28/2021 11:08 AM    Chloride 103 10/28/2021 11:08 AM    CO2 22 10/28/2021 11:08 AM    Glucose 106 (H) 10/28/2021 11:08 AM    BUN 8 10/28/2021 11:08 AM    Creatinine 0.71 10/28/2021 11:08 AM    GFR est AA >60 10/28/2021 11:08 AM    GFR est non-AA >60 10/28/2021 11:08 AM    Calcium 9.6 10/28/2021 11:08 AM    Glucose (POC) 109 08/16/2021 08:39 AM     Lab Results   Component Value Date/Time    Bilirubin, total 0.2 10/28/2021 11:08 AM    ALT (SGPT) 57 10/28/2021 11:08 AM    Alk. phosphatase 329 (H) 10/28/2021 11:08 AM    Protein, total 8.9 (H) 10/28/2021 11:08 AM    Albumin 2.5 (L) 10/28/2021 11:08 AM    Globulin 6.4 (H) 10/28/2021 11:08 AM     CT Chest 7/23/21  IMPRESSION:  Spiculated right upper lobe mass with positive right hilar and mediastinal  adenopathy. Probable hepatic and possible left adrenal metastases. Recommend  PET/CT. CT Chest 7/30/21  IMPRESSION:  Right upper lobe pulmonary nodule with mediastinal and right hilar  lymphadenopathy, highly suspicious for primary pulmonary neoplasm. 2 mm right  middle lobe pulmonary nodule.  Suspicious masses in the right adrenal gland and  liver. Brain MRI 8/3/21  IMPRESSION:  1. 2.5 cm left temporal lobe mass with subacute hemorrhage. Considering  patient's history this is most likely metastasis. 2. Mild nonspecific white matter T2 hyperintensity may be related to mild  chronic small vessel ischemic change. CT Head 8/11/21  IMPRESSION:  2.5 cm left temporal lobe enhancing mass. 8/11/21  FINAL PATHOLOGIC DIAGNOSIS:   1.  Brain, left temporal tumor, biopsy:        Metastatic poorly differentiated carcinoma   See comment   2.  Brain, left temporal tumor, excision:        Metastatic poorly differentiated carcinoma     Comment   Immunohistochemical stains reveal reactivity for CK7 and NATHANIEL-3, with   patchy/focal p40 staining. ER, MO, HER2, TTF-1, MART-1, SOX-10, CK20, and   CDX-2 stains are negative in the malignant cells.     The patient's recent lung/mediastinal lymph node biopsies are reviewed and   the current tumor shares some morphologic characteristics with the   malignant cells in the 4R lymph node cell block ([id number]), however   evaluation is limited by extensive necrosis. A subset of pulmonary   adenocarcinomas show NATHANIEL-3 expression (approximately 12%), therefore   metastatic carcinoma from a lung primary is still a possibility, however   NATHANIEL-3 expression is much more commonly seen in breast and urothelial   primaries, which should be reasonably excluded clinically. Note is made that the patient's recent lung biopsy was sent for molecular   testing which is pending, therefore additional testing is not requested on   this specimen. If needed, tumor material is available in block 2A of the   current specimen for ancillary testing. These findings were discussed with Dr. Reymundo Estes via [employer]   at 2:30 p.m. on 8/13/2021. CT Head 8/12/21  IMPRESSION:  Recent left temporoparietal craniotomy with tumor resection.  No  operative complications    PET Results (most recent):  Results from [facility] [place] Encounter encounter on 08/16/21    PET/CT TUMOR IMAGE SKULL THIGH W (INI)    Addendum 8/24/2021  2:23 PM  Addendum: There is a focus of increased tracer activity corresponding to a 5 mm  lytic lesion in the C4 vertebral body, compatible with metastatic disease. Narrative  PET/CT SCAN    PROCEDURE: Following IV injection of 9.98 mCi 18 Fluoro 2 deoxyglucose (FDG) and  a standard uptake delay, PET imaging is performed from the skull vertex to mid  thigh and axial, sagittal and coronal images were acquired. Unenhanced CT is  obtained for anatomic localization, and attenuation correction of the PET scan. Patient preprocedure blood glucose level: 109mg/dL. CORRELATIVE IMAGING STUDIES: Chest CT 7/30/2021. COMPARISON PET: None    HISTORY: The study is requested for staging metastatic poorly differentiated  carcinoma. Biopsy confirmed diagnosis. FINDINGS:    HEAD/NECK: Photopenia corresponds to the left frontal postoperative changes. CHEST: Increased tracer activity corresponds to the lesion in the right upper  lobe, maximum SUV is 7.3. Additionally, there is hypermetabolic right  paratracheal, precarinal, and right hilar lymphadenopathy. ABDOMEN/PELVIS: Hypermetabolic right adrenal mass, maximum SUV is 9.3. There is  no abnormal uptake to correspond to the small hypodense lesion in the liver. SKELETON: No foci of abnormal hypermetabolism in the axial and visualized  appendicular skeleton. Impression  Hypermetabolic mass lesion right upper lobe with hypermetabolic  mediastinal and right hilar lymphadenopathy. Hypermetabolic right adrenal mass  is concerning for metastatic disease. [facility] Results (most recent):  Results from [place] encounter on 08/27/21    [facility] 3D HEBER W MAMMO BI SCREENING INCL CAD    Narrative  STUDY: Bilateral digital screening mammogram with 3-D tomosynthesis    INDICATION:  Screening.     COMPARISON: 2013    BREAST COMPOSITION: There are scattered areas of fibroglandular density. FINDINGS: Bilateral digital screening mammography was performed and is  interpreted in conjunction with a computer assisted detection (CAD) system. Additionally, tomosynthesis of both breasts in the CC and MLO projections was  performed. Bilateral implant displaced views are performed. Bilateral  submuscular implants are stable. No suspicious masses or calcifications are  identified. There has been no significant change. Impression  BI-RADS 2: Benign. No mammographic evidence of malignancy. RECOMMENDATIONS:  Next screening mammogram is recommended in one year. The patient will be notified of these results. MRI Results (most recent):  Results from [place] encounter on 09/12/21    MRI CERV SPINE W WO CONT    Narrative  EXAM: MRI CERV SPINE W WO CONT    INDICATION: Met lung cancer, + c spine lesion on PET, further eval. Malignant  neoplasm of unspecified part of unspecified bronchus or lung    COMPARISON: PET/CT 8/16/2021. TECHNIQUE: MR imaging of the cervical spine was performed using the following  sequences: sagittal T1, T2, STIR;  axial T2, T1 prior to and following contrast  administration. CONTRAST: 15 mL of ProHance. FINDINGS:  C4 vertebra shows abnormal marrow signal and enhancement compatible with  tumor/metastasis. There is extension into the right foramina and transverse  process surrounding the patent vertebral artery. There is no significant  intraspinal extension and no cord compression. There is normal alignment of the cervical spine. Vertebral body heights are maintained    The craniocervical junction is intact. The course, caliber, enhancement, and  signal intensity of the spinal cord are normal.    The paraspinal soft tissues are within normal limits. C2-C3: No herniation or stenosis. C3-C4: No herniation or spinal stenosis. Left foraminal stenosis. C4-C5: No disc herniation. Tumor encroachment on the right foramen. Degenerative  disc/facet disease with mild spinal stenosis and left foraminal stenosis. C5-C6: No disc herniation. Advanced disc degeneration. Mild spinal stenosis and  bilateral foraminal encroachment. C6-C7: No disc herniation. Advanced disc degeneration. Mild spinal stenosis and  bilateral foraminal encroachment. C7-T1: No herniation or stenosis. Impression  1. C4 vertebral tumor/metastasis extending into the right foramen, without  spinal stenosis. 2. Multilevel cervical degenerative disc disease. 3. No fracture or subluxation. Records reviewed and summarized above. Pathology report(s) reviewed above. Radiology report(s) reviewed above. Assessment/PLAN:     1)  Stage 4 PD Carcinoma Nonspecific Path likely Lung Primary with Brain/Bone Mets    Spiculated RUL lung mass on CT 7/21  Post neurosurgery 8/11/21 and path likely lung could be breast  Since likely lung and PDL1 90% and can do immunotherapy alone. Prior molecular testing negative except for PD-L1 90%. PET +in lung and mediastinal LNs/possible adrenal met. She had Gamma Knife with Dr. Vinicius Oro on 9/9/21. She had a mammogram that was negative. Patient started [address] on 9/16/21. She is here today or Cycle 2 of Keytruda. She tolerated first cycle well overall with some fatigue and chills. She is clinically stable today and doing well overall. Labs (CBC, CMP, and TSH) reviewed today. Hgb is 8.9 g/dL and PLTs 744k today. Added anemia work up to labs today. Patient is ready for treatment today. Follow up in 6 weeks with Cycle 3. Patient agrees with plan. Patient does not want COVID vaccine. Reviewed higher risk of getting COVID/delta variant and dying of COVID due to no vaccine. Goal of care palliative. 2) Brain Metastases   Post neurosurgery. She had Gamma Knife with Dr. Vinicius Oro. Follow up Brain MRI next week per patient. 3) C-Spine Bon Metastases   Completed XRT on 10/21/21.      4) Cancer Related Pain   On Oxycodone and Morphine per Palliative Care. Pain is controlled per patient. 5) Smoker  She is using Nicotine gum. She has cut back significantly. Offered smoking cessation class with NN but patient declined at this time. Management per PCP. 6) Chronic Anxiety  She takes Xanax PRN per PCP. 7) Occasional Headaches  Controlled with pain medications. 8) Management of High Risk Medications - Keytruda  Toxicities include Grade 1 Fatigue and Grade 1 Chills. Reviewed side effect management today. Labs (CBC, CMP and TSH) reviewed today. Hgb is 8.9 g/dL and PLTs 744k today. Added anemia work up to labs today. No dose adjustments needed today. Will continue to monitor side effects. 9) Psychosocial  Mood good, coping well with family support. 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. She is  with kids/grandkids, was in [employer]. She has two ex husbands. SW/NN support as needed. She is here alone today. Call if questions. Follow up in 6 weeks with Cycle 3. This patient was seen in conjunction with Lexy Lopez NP. I personally performed a face to face diagnostic evaluation on this patient. I personally reviewed the history and performed the key points on the exam.   I personally reviewed all points in the assessment and created treatment plan with the patient. Specifically, pt seen by me today  Doing well overall  Tolerating immunotherapy with fatigue  Labs personally reviewed today. Anemia altamirano. Proceed with IO as ordered with monitoring. Pt agreeable to plan. I appreciate the opportunity to participate in Ms. Nader Fonseca's care.     Signed By: Lakshmi Mcnair DO

## 2021-11-01 NOTE — PROGRESS NOTES
Attempted to reach patient off mobile phone number listed, no answer. Left a voicemail to give the office a call back!   Megha Pack

## 2021-11-05 NOTE — PROGRESS NOTES
JACINTO Verified. Called pt on mobile phone number listed. Patient was advised to take daily oral OTC iron/MVI daily per lab results. Patient verbalized understanding and expressed she needed a refill on her Oxycodone.   Hemalatha James

## 2021-11-05 NOTE — PROGRESS NOTES
HIPPA Verified. Called patient off mobile phone number listed. Patient was made aware Pain meds are filled by [facility]! She verbalized understanding and was appreciative for this update.   Manuel James

## 2021-11-05 NOTE — PROGRESS NOTES
Pain meds are filled by [facility], Dr. Idris Flores. Please advise patient to contact their office for refills.

## 2021-11-10 NOTE — PROGRESS NOTES
Palliative Medicine Outpatient Services  [place]: [phone] ([phone])    Patient Name: Esa Yates  YOB: 1960    Date of Current Visit: 10/13/21  Location of Current Visit:    [] [facility] Office  [] [facility] Office  [] [facility] Office  [] Home  [x]Synchronous real-time A/V virtual visit    Date of Initial Visit: 9/27/21  Referral from: Gibson Arana DO  Primary Care Physician: None      SUMMARY:   Esa Yates is a 63y.o. 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 Mae Michel for symptom management and psychosocial support. She was diagnosed in 7/2021, underwent surgical resection of left temporal mass 8/11/21. She is currently being treated with immune therapy Melindakendra Rose) and has been referred to Radiation-Oncology for C4 lesion. She is receiving cancer-directed therapy with the goal of palliation of symptoms and prolongation of quality life. Quality of life (rather than quantity) is important to her and guides her health care decisions. The patients social history includes: she is  and lives alone. She works as a respiratory therapist at [employer]. She has an adult daughter, Jose De Jesus Taveras, who has a 11year old [facility]. She also has a 24year old daughter, Michele Adam, who lives in [place] and plans to attend veterinary school. She has a sister in [place], [place]. She has a sister in [address] who has end stage MS, and an 80year old mother in [address], who is 360 lbs, and currently on hospice. The patient takes care of her mother, and her mother's home, several days each week. She has a best friend, Trev Lazo, who works for [employer] [facility] [employer]. Palliative Medicine is addressing the following current patient/family concerns: symptoms related to metastatic carcinoma; support in care decisions consistent with her personal goals and values.     Initial Referral Intake note reviewed   PALLIATIVE DIAGNOSES:       ICD-10-CM ICD-9-CM    1. Pain of right scapula  M89.8X1 733.90 oxyCODONE IR (ROXICODONE) 5 mg immediate release tablet   2. Fatigue, unspecified type  R53.83 780.79    3. Chronic idiopathic constipation  K59.04 564.00    4. Anxiety  F41.9 300.00    5. Depression, unspecified depression type  F32. A 311    6. Non-small cell lung cancer, unspecified laterality (HCC)  C34.90 162.9 oxyCODONE IR (ROXICODONE) 5 mg immediate release tablet          PLAN:   Patient Instructions     Dear Laura Lewis ,    It was a pleasure seeing you today via televisit. We will see you again in 4 weeks for a televisit. If labs or imaging tests have been ordered for you today, please call the office  at [phone] 48 hours after completion to obtain the results. Your described symptoms were: Fatigue: 7 Drowsiness: 0   Depression: 6 Pain: 7   Anxiety: 6 Nausea: 0   Anorexia: 0 (States she does not have lack of debra, just a sore throat) Dyspnea: 0 (flucuates with increased walking)   Best Well-Bein Constipation: No   Other Problem (Comment): 0       This is the plan we talked about:    1. Pain  -You feel your pain is manageable with your current medication regimen  -This allows you to be in control of your pain without experiencing drowsiness or other side effects which may interfere with your responsibilities in caring for your mother  -You take extended-release morphine twice daily  -You typically take 2 oxycodone about 2:30pm and another 1 to 2 tabs if you wake up with pain  -Continue extended-release morphine 15-mg every 12 hours  -Continue oxycodone 5-mg: take 1 to 2 tabs every 4 hours as needed  -Continue tylenol 500-mg: take 2 tabs every 6 hours as needed    2.  Fatigue  -You continue to feel fatigued most of the time  -You sleep well at night with Xanax  -You've tried Ambien in the past and it was ineffective  -Dr. Larwence Baumgarten checked your labs last month: your thyroid function is normal and you are mildly anemic (hemoglobin 10)  -Fatigue is a common side effect of immune therapy    3. Constipation  -You're moving your bowels regularly  -Continue senna-docusate sodium to 2 tabs at bedtime  -Continue daily gentle-lax (Miralax)  -We discussed in prior visits how to increase these medications as needed to avoid constipation    4. Anxiety and depression  -You've been more tearful lately which you typically experience this time of year due to Seasonal Affective Disorder  -You've managed this in the past by going to the gym  -Today we talked about your starting a walking program, going out during the day for 20-23 minute walks 3-4 times a week  -You're also thinking about getting a light which simulates natural sunlight  -You agreed to starting an anti-depressant and I prescribed Zoloft 50-mg milad  -Continue Xanax 1-mg twice daily as needed  -You typically take 1 to 1.5 Xanax daily  -We talked about a referral for Music Therapy and you declined this for now  -At your initial visit we talked about the synergistic effect between benzodiazepines (Xanax) and opioids (morphine, oxycodone) which requires that we work together closely to avoid potential adverse side-effects    5. Lung cancer  -You're receiving Keytruda under the care of Dr. Lucille Schmitt  -Segundo Moncada completed radiation therapy to your neck on 10/19 and 10/21    6.  Advance Care Planning/Goals-of-Care (we talked about this at your initial visit)  -You receiving cancer-directed therapy with the goal of palliation of symptoms and prolongation of quality life  -Quality of life is more important to you than quantity of life and you make your health care decisions guided by this value  -You've completed your Advance Medical Directives, naming your friend, Aziza Salazar, who is a hospice nurse, as your primary medical decision-maker      This is what you have shared with us about Advance Care Planning:      Primary Decision Maker (Active): Liliana Palumbo - [phone]  Advance Care Planning 11/10/2021   Patient's Healthcare Decision Maker is: Named in scanned ACP document   Confirm Advance Directive Yes, on file   Patient Would Like to Complete Advance Directive -   Does the patient have other document types -           The Palliative Medicine Team is here to support you and your family. Sincerely,      Bruna Jacobsen MD and the Palliative Medicine Team           GOALS OF CARE / TREATMENT PREFERENCES:   [====Goals of Care====]  GOALS OF CARE:  Patient / health care proxy stated goals: See Patient Instructions / Summary    TREATMENT PREFERENCES:   Code Status:  [x] Attempt Resuscitation       [] Do Not Attempt Resuscitation    Advance Care Planning:  [x] The [employer] Interdisciplinary Team has updated the ACP Navigator with Decision Maker and Patient Capacity      Primary Decision Maker (Active): Liliana Palumbo  [phone]  Advance Care Planning 11/10/2021   Patient's Healthcare Decision Maker is: Named in scanned ACP document   Confirm Advance Directive Yes, on file   Patient Would Like to Complete Advance Directive -   Does the patient have other document types -       Other:  (If patient appropriate for POST, consider using PALLPOST smart phrase here)    The palliative care team has discussed with patient / health care proxy about goals of care / treatment preferences for patient.  [====Goals of Care====]     PRESCRIPTIONS GIVEN:     Medications Ordered Today   Medications    oxyCODONE IR (ROXICODONE) 5 mg immediate release tablet     Sig: Take 1 Tablet by mouth every four (4) hours as needed for Pain for up to 20 days. Max Daily Amount: 30 mg. Dispense:  120 Tablet     Refill:  0     OK to fill today    sertraline (ZOLOFT) 50 mg tablet     Sig: Take 1 Tablet by mouth daily.      Dispense:  30 Tablet     Refill:  2           FOLLOW UP:     Future Appointments   Date Time Provider Lakeshia Valencia 12/16/2021 10:00 AM A1 [facility] [address]   12/16/2021 10:15 AM Dianna Vaughan  [address] BS AMB   1/27/2022 11:00 AM B3 [facility] [address] [facility]   3/10/2022 11:00 AM C1 [facility] [address] [facility] H   4/21/2022 11:00 AM B3 [facility]            PHYSICIANS INVOLVED IN CARE:   Patient Care Team:  None as PCP - General  Garett Sharma DO (Hematology and Oncology)  Ester Dominguez RN as Benefits Care Manager  Dhaval Villalobos MD as Physician (Palliative Medicine)  Dhaval Villalobos MD as Physician (Palliative Medicine)       HISTORY:   Reviewed patient-completed ESAS and advance care planning form. Reviewed patient record in prescription monitoring program.    CHIEF COMPLAINT:   Chief Complaint   Patient presents with    Shoulder Pain       HPI/SUBJECTIVE:    The patient is: [x] Verbal / [] Nonverbal      See Plan/Patient Instructions for interval history    From IV 9/27/2021:  She had a headache and pain in her right chest when she returned from a trip to [facility] last July. She had a chest X-ray which showed the mass in her lung, then had a CT scan which showed the brain mass. She had gamma knife (Dr. Cooper Davidson), this helped with the headache. She started [place] about 2 weeks ago. Since then, she's had more pain in her right chest and in her abdomen \"where the cancer is. \"  She's also been having neck pain and a burning pain from her back, under her right shoulder blade. She's been taking 1 to 2 oxycodone ~ 4 times a day which helps but wears off after 4 to 4.5 hours. She's also been taking tylenol. .  She's going to start radiation therapy tomorrow for the lesion in her neck. She feels very fatigued this morning. She takes care of her grandson, Saira Collazo, 3 days a week while her daughter works. He spends the night with her, too. She also cares for her 80-year old mother, who lives in her own home with hospice support.   She goes to her mother's house every morning to help her bath, does the laundry. She hasn't been sleeping well since her cancer diagnosis. She takes Xanax at bedtime which helps her go to sleep, then takes melatonin when she wakes up in the middle of the night. She usually goes back to sleep after taking melatonin. She tried Ambien in the past and it made her feel too sedated the following day, interfered with her work. Her appetite is good. She's had trouble with her bowels all of her life. She goes between constipation and diarrhea. She's never seen a doctor for this. She usually takes Metamucil when she gets constipated. She hasn't noticed any difference in her bowels since she started taking oxycodone. She's been taking Xanax for 15 years for \"anger\" related to family issues. She's used it from time-to-time in the past, more since her cancer diagnosis. She usually takes 1/2 tab during the day if she needs it. She's never taken any other medications for anxiety or depression. She wouldn't have had gamma knife but she needed to get her affairs in order. She works as a respiratory therapist in the ICU at [employer] and she's seen how people can get. She doesn't want to be like that. She now has a will and made plans for her cremation. She had a AMD; her friend, Sebastian Beckett, is a hospice RN and is her decision-maker.     Clinical Pain Assessment (nonverbal scale for nonverbal patients):   [++++ Clinical Pain Assessment++++]  [++++Pain Severity++++]: Pain: 7  [++++Pain Character++++]: burning under shoulder blade; sharp, knife-like other areas  [++++Pain Duration++++]: since August 2021  [++++Pain Effect++++]:  [++++Pain Factors++++]: oxycodone helps; wearing abdominal binder helps  [++++Pain Frequency++++]: constant with varying intensity  [++++Pain Location++++]: neck; under right shoulder blade; upper quadrant abdomen; right lower flank  [++++ Clinical Pain Assessment++++]       FUNCTIONAL ASSESSMENT:     Palliative Performance Scale (PPS):  PPS: 70       PSYCHOSOCIAL/SPIRITUAL SCREENING:     Any spiritual / Rastafari concerns:  [] Yes /  [x] No    Caregiver Burnout:  [] Yes /  [] No /  [x] No Caregiver Present      Anticipatory grief assessment:   [x] Normal  / [] Maladaptive       ESAS Anxiety: Anxiety: 6    ESAS Depression: Depression: 6       REVIEW OF SYSTEMS:     The following systems were [x] reviewed / [] unable to be reviewed  Systems: constitutional, ears/nose/mouth/throat, respiratory, gastrointestinal, genitourinary, musculoskeletal, integumentary, neurologic, psychiatric, endocrine. Positive findings noted below. Modified ESAS Completed by: provider   Fatigue: 7 Drowsiness: 0   Depression: 6 Pain: 7   Anxiety: 6 Nausea: 0   Anorexia: 0 (States she does not have lack of debra, just a sore throat) Dyspnea: 0 (flucuates with increased walking)   Best Well-Bein Constipation: No   Other Problem (Comment): 0          PHYSICAL EXAM:     Wt Readings from Last 3 Encounters:   10/28/21 154 lb 6.4 oz (70 kg)   21 158 lb (71.7 kg)   21 157 lb 3.2 oz (71.3 kg)     There were no vitals taken for this visit.   Last bowel movement: See Nursing Note    Constitutional    [x] Appears well-developed and well-nourished in no apparent distress    [] Abnormal:  Mental status  [x] Alert and awake  [x] Oriented to person/place/time  [x] Able to follow commands  [] Abnormal:   Eyes  [x] EOM normal   [x] Sclera normal   [x] No visible ocular discharge  [] Abnormal:   HENT  [x] Normocephalic, atraumatic  [x] Mouth/Throat: Moist mucous membranes   [x] External Ears normal  [] Abnormal:  Neck  [x] No visualized mass  [] Abnormal:  Pulmonary/Chest   [x] Respiratory effort normal  [x] No visualized signs of difficulty breathing or respiratory distress  [] Abnormal:  Musculoskeletal  [x] Normal gait with no signs of ataxia  [x] Normal range of motion of neck  [] Abnormal:  Neurological:   [x] No facial asymmetry (Cranial nerve 7 motor function)  [x] No gaze palsy  [] Abnormal:   Skin  [x] No significant exanthematous lesions or discoloration noted on facial skin  [] Abnormal:                                  Psychiatric  [x] Normal affect  [x] No hallucinations  [] Abnormal:    Other pertinent observable physical exam findings:    Due to this being a TeleHealth evaluation, many elements of the physical examination are unable to be assessed. HISTORY:     Past Medical History:   Diagnosis Date    Cancer [facility]) 2021    LUNGS AND BRAIN AND LYMPH NODES    Hypercholesterolemia     Metastatic carcinoma ([facility] [address] 8/19/2021      Past Surgical History:   Procedure Laterality Date    HX BREAST AUGMENTATION      HX HERNIA REPAIR      IMPLANT BREAST SILICONE/EQ Bilateral 1095    sub-pectoral      Family History   Problem Relation Age of Onset    Depression Mother     Obesity Mother     Heart Disease Mother     Mult Sclerosis Sister         END STAGE    Cancer Brother         TONSIL, WITH METS TO LIVER AND BONES    Heart Disease Maternal Grandfather     Cancer Paternal Grandmother     Anesth Problems Neg Hx       History reviewed, no pertinent family history. Social History     Tobacco Use    Smoking status: Current Every Day Smoker     Packs/day: 0.75     Years: 40.00     Pack years: 30.00    Smokeless tobacco: Never Used   Substance Use Topics    Alcohol use: Yes     Alcohol/week: 3.0 standard drinks     Types: 3 Cans of beer per week     Comment: socially     No Known Allergies   Current Outpatient Medications   Medication Sig    oxyCODONE IR (ROXICODONE) 5 mg immediate release tablet Take 1 Tablet by mouth every four (4) hours as needed for Pain for up to 20 days. Max Daily Amount: 30 mg.    albuterol (PROVENTIL HFA, VENTOLIN HFA, PROAIR HFA) 90 mcg/actuation inhaler Take 2 Puffs by inhalation every four (4) hours as needed for Wheezing.  ferrous sulfate (IRON PO) Take  by mouth.  MULTIVITAMIN PO Take  by mouth.     ALPRAZolam (XANAX) 1 mg tablet TAKE 1 TABLET BY MOUTH EVERY 4 HOURS AS NEEDED FOR ANXIETY    senna (Senna) 8.6 mg tablet Take 2 Tablets by mouth daily.  OTHER Gentle lax    levETIRAcetam (KEPPRA) 500 mg tablet Take 1 Tablet by mouth two (2) times a day.  polyethylene glycol (Miralax) 17 gram/dose powder Take 17 g by mouth as needed for Constipation.  simethicone (GAS-X) 125 mg capsule Take 125 mg by mouth daily as needed for Flatulence.  acetaminophen (TYLENOL) 325 mg tablet Take 2 Tablets by mouth every six (6) hours as needed for Pain.  tiotropium (Spiriva with HandiHaler) 18 mcg inhalation capsule Take 1 Capsule by inhalation daily.  medroxyPROGESTERone (Provera) 5 mg tablet Take 5 mg by mouth daily.  rosuvastatin (CRESTOR) 10 mg tablet TAKE 1 TABLET BY MOUTH EVERY DAY    morphine CR (MS CONTIN) 15 mg CR tablet Take 1 Tablet by mouth every twelve (12) hours for 30 days. Max Daily Amount: 30 mg.    naloxone (Narcan) 4 mg/actuation nasal spray Use 1 spray intranasally, then discard. Repeat with new spray every 2 min as needed for opioid overdose symptoms, alternating nostrils.  dexAMETHasone (DECADRON) 4 mg tablet Take 1 tab PO every 12 hours tonight then 1/2 a tab PO every 12 hours x 2 days then 1/2 a tab daily x 2 days then stop. (Patient not taking: Reported on 9/27/2021)    varenicline (Chantix) 1 mg tablet Take 1 mg by mouth two (2) times daily (after meals). (Patient not taking: Reported on 9/27/2021)    estradioL (Estrace) 0.5 mg tablet Take 0.5 mg by mouth daily. (Patient not taking: Reported on 10/13/2021)     No current facility-administered medications for this visit.           LAB DATA REVIEWED:     Lab Results   Component Value Date/Time    WBC 17.7 (H) 10/28/2021 11:08 AM    HGB 8.9 (L) 10/28/2021 11:08 AM    PLATELET 459 (H) 65/49/3649 11:08 AM     Lab Results   Component Value Date/Time    Sodium 132 (L) 10/28/2021 11:08 AM    Potassium 4.1 10/28/2021 11:08 AM Chloride 103 10/28/2021 11:08 AM    CO2 22 10/28/2021 11:08 AM    BUN 8 10/28/2021 11:08 AM    Creatinine 0.71 10/28/2021 11:08 AM    Calcium 9.6 10/28/2021 11:08 AM      Lab Results   Component Value Date/Time    Alk. phosphatase 329 (H) 10/28/2021 11:08 AM    Protein, total 8.9 (H) 10/28/2021 11:08 AM    Albumin 2.5 (L) 10/28/2021 11:08 AM    Globulin 6.4 (H) 10/28/2021 11:08 AM     No results found for: INR, PTMR, PTP, PT1, PT2, APTT, INREXT, INREXT   Lab Results   Component Value Date/Time    Iron 31 (L) 10/28/2021 11:08 AM    TIBC 205 (L) 10/28/2021 11:08 AM    Iron % saturation 15 (L) 10/28/2021 11:08 AM    Ferritin 1,539 (H) 10/28/2021 11:08 AM        CT chest 7/23/21:  Spiculated right upper lobe mass with positive right hilar and mediastinal  adenopathy. Probable hepatic and possible left adrenal metastases. Recommend  PET/CT. MRI brain 8/3/21:  1. 2.5 cm left temporal lobe mass with subacute hemorrhage. Considering  patient's history this is most likely metastasis. 2. Mild nonspecific white matter T2 hyperintensity may be related to mild  chronic small vessel ischemic change. PET-CT 6/20/44:  Hypermetabolic mass lesion right upper lobe with hypermetabolic  mediastinal and right hilar lymphadenopathy. Hypermetabolic right adrenal mass  is concerning for metastatic disease.     MRI cervical spine 9/12/21:  1. C4 vertebral tumor/metastasis extending into the right foramen, without  spinal stenosis. 2. Multilevel cervical degenerative disc disease. 3. No fracture or subluxation.      CONTROLLED SUBSTANCES SAFETY ASSESSMENT (IF ON CONTROLLED SUBSTANCES):     Reviewed opioid safety handout:  [x] Yes   [] No  24 hour opioid dose >150mg morphine equivalent/day:  [] Yes   [x] No  Benzodiazepines:  [x] Yes   [] No  Sleep apnea:  [] Yes   [x] No  Urine Toxicology Testing within last 6 months:  [] Yes   [x] No  History of or new aberrant medication taking behaviors:  [] Yes   [] No  Has Narcan been prescribed [] Yes   [x] No          Total time:   Counseling / coordination time:   > 50% counseling / coordination?:     Consent:  He and/or health care decision maker is aware that that he may receive a bill for this telehealth service, depending on his insurance coverage, and has provided verbal consent to proceed: Yes    CPT Codes 81077-74243 for Established Patients may apply to this Telehealth Visit    Pursuant to the emergency declaration under the [address] [place] [address] waiver authority and the [employer] Act, this Virtual  Visit was conducted, with patient's consent, to reduce the patient's risk of exposure to COVID-19 and provide continuity of care for an established patient. Services were provided through a video synchronous discussion virtually to substitute for in-person clinic visit.

## 2021-11-10 NOTE — PATIENT INSTRUCTIONS
Dear Magnolia Wagner ,    It was a pleasure seeing you today via televisit. We will see you again in 4 weeks for a televisit. If labs or imaging tests have been ordered for you today, please call the office  at [phone] 48 hours after completion to obtain the results. Your described symptoms were: Fatigue: 7 Drowsiness: 0   Depression: 6 Pain: 7   Anxiety: 6 Nausea: 0   Anorexia: 0 (States she does not have lack of debra, just a sore throat) Dyspnea: 0 (flucuates with increased walking)   [facility] Constipation: No   Other Problem (Comment): 0       This is the plan we talked about:    1. Pain  -You feel your pain is manageable with your current medication regimen  -This allows you to be in control of your pain without experiencing drowsiness or other side effects which may interfere with your responsibilities in caring for your mother  -You take extended-release morphine twice daily  -You typically take 2 oxycodone about 2:30pm and another 1 to 2 tabs if you wake up with pain  -Continue extended-release morphine 15-mg every 12 hours  -Continue oxycodone 5-mg: take 1 to 2 tabs every 4 hours as needed  -Continue tylenol 500-mg: take 2 tabs every 6 hours as needed    2. Fatigue  -You continue to feel fatigued most of the time  -You sleep well at night with Xanax  -You've tried Ambien in the past and it was ineffective  -Dr. David Shipman checked your labs last month: your thyroid function is normal and you are mildly anemic (hemoglobin 10)  -Fatigue is a common side effect of immune therapy    3. Constipation  -You're moving your bowels regularly  -Continue senna-docusate sodium to 2 tabs at bedtime  -Continue daily gentle-lax (Miralax)  -We discussed in prior visits how to increase these medications as needed to avoid constipation    4.  Anxiety and depression  -You've been more tearful lately which you typically experience this time of year due to Seasonal Affective Disorder  -You've managed this in the past by going to the gym  -Today we talked about your starting a walking program, going out during the day for 20-23 minute walks 3-4 times a week  -You're also thinking about getting a light which simulates natural sunlight  -You agreed to starting an anti-depressant and I prescribed Zoloft 50-mg milad  -Continue Xanax 1-mg twice daily as needed  -You typically take 1 to 1.5 Xanax daily  -We talked about a referral for Music Therapy and you declined this for now  -At your initial visit we talked about the synergistic effect between benzodiazepines (Xanax) and opioids (morphine, oxycodone) which requires that we work together closely to avoid potential adverse side-effects    5. Lung cancer  -You're receiving Keytruda under the care of Dr. Tsohia Perez  -Dean Keith completed radiation therapy to your neck on 10/19 and 10/21    6. Advance Care Planning/Goals-of-Care (we talked about this at your initial visit)  -You receiving cancer-directed therapy with the goal of palliation of symptoms and prolongation of quality life  -Quality of life is more important to you than quantity of life and you make your health care decisions guided by this value  -You've completed your Advance Medical Directives, naming your friend, Nohemy Denise, who is a hospice nurse, as your primary medical decision-maker      This is what you have shared with us about Advance Care Planning:      Primary Decision Maker (Active): Magnolia Pimentel - [facility] 11/10/2021   Patient's [address] is: Named in scanned ACP document   Confirm Advance Directive Yes, on file   Patient Would Like to Complete Advance Directive -   Does the patient have other document types -           The Palliative Medicine Team is here to support you and your family.        Sincerely,      Nicki Arriaga MD and the Palliative Medicine Team

## 2021-11-10 NOTE — PROGRESS NOTES
Palliative Medicine Office Visit  Palliative Medicine Nurse Check In  [phone]    Patient Name: Arnaldo Garcia  YOB: 1960      Date of Office Visit:  11/10/2021    Patient states: \" pain level 7  \"    From Check In Sheet (scanned in Media):  Has a medical provider talked with you about cardiopulmonary resuscitation (CPR)? [] Yes   [] No   [] Unable to obtain    Nurse reminder to complete or update ACP FlowSheet:    Is ACP on the Problem List?    [] Yes    [x] No  IF ACP Document is ON FILE; Nurse to place ACP on Problem List     Is there an ACP Note in Chart Review/Note? [x] Yes    [] No   If NO: [address] Planning 10/28/2021   Patient's Healthcare Decision Maker is: Legal Next of Kin   Confirm Advance Directive Yes, on file   Patient Would Like to Complete Advance Directive -   Does the patient have other document types -       Is there anything that we should know about you as a person in order to provide you the best care possible? Have you been to the ER, urgent care clinic since your last visit? [] Yes   [x] No   [] Unable to obtain    Have you been hospitalized since your last visit? [] Yes   [x] No   [] Unable to obtain    Have you seen or consulted any other health care providers outside of the [address] [place] [address] since your last visit?    [] Yes   [x] No   [] Unable to obtain    Functional status (describe):         Last BM: 11/9/2021     accessed (date): 11/10/2021    Bottle review (for opioid pain medication):  Medication 1:   Date filled:   Directions:   # filled:   # left:   # pills taking per day:  Last dose taken:    Medication 2:   Date filled:   Directions:   # filled:   # left:   # pills taking per day:  Last dose taken:    Medication 3:   Date filled:   Directions:   # filled:   # left:   # pills taking per day:  Last dose taken:    Medication 4:   Date filled:   Directions:   # filled:   # left:   # pills taking per day:  Last dose taken:

## 2021-11-29 NOTE — TELEPHONE ENCOUNTER
Triage for Controlled Substance Refill Request    Pain Diagnosis: _Abdominal pain, generalized (R10.84); Metastatic carcinoma (HCC) (C79.9); Neck pain (M54.2)    Last Outpatient Visit: _11/10/2021    Next Outpatient Visit: _12/15/2021    Reason for refill needed outside of office visit? Appointment not scheduled prior to need for scheduled refill      Pharmacy: _[address]    Medication:ALPRAZolam (XANAX) 1 mg tablet       Dose and directionsTAKE 1 TABLET BY MOUTH EVERY 4 HOURS AS NEEDED FOR ANXIETY:  Number dispensed:60  Date filled ( or Pharmacy):10/13/2021  # left:    Medication:morphine CR (MS CONTIN) 15 mg CR tablet      Dose and directions: Take 1 Tablet by mouth every twelve (12) hours for 30 days  Number dispensed:  Date filled ( or Pharmacy):10/29/2021  #left:     reviewed: _yes    Date of Urine Drug Screen:  _    Opioid Safety Handout given:  _yes    Appropriate for refill:  _yes    Action:  _ Medication pending for approval

## 2021-11-29 NOTE — TELEPHONE ENCOUNTER
Patient called and said that she needs a refill on her morphine sulfate prescription, she has 1 pill left. The patient also said that she has 10 pills left of another prescription, she thought the name was xanax. She said that she uses the [email or web address].

## 2021-12-16 NOTE — PROGRESS NOTES
[facility] at [address] [place], [address] [address], [place]: [phone]  F: [phone]    Reason for Visit:   Tamar Gaviria is a 63 y.o. female seen today in office for follow up of Metastatic Cancer likely Lung Primary    Treatment History:   · CT Chest 7/23/21: Spiculated right upper lobe mass with positive right hilar and mediastinal adenopathy. Probable hepatic and possible left adrenal metastases. Recommend PET/CT  · CT Chest 7/30/21: Right upper lobe pulmonary nodule with mediastinal and right hilar lymphadenopathy, highly suspicious for primary pulmonary neoplasm. 2 mm right middle lobe pulmonary nodule. Suspicious masses in the right adrenal gland and liver  · Brain MRI 8/3/21: 2.5 cm left temporal lobe mass with subacute hemorrhage. Considering patient's history this is most likely metastasis. Mild nonspecific white matter T2 hyperintensity may be related to mild chronic small vessel ischemic change  · CT Head 8/11/21: 2.5 cm left temporal lobe enhancing mass  · 8/11/21 PROCEDURE PERFORMED:  BrainLAB-guided stereotactic craniotomy resection of left temporal hemorrhagic metastatic brain tumor, use of operating microscope and stereotaxy  · CT Head 8/12/21: Recent left temporoparietal craniotomy with tumor resection. No operative complications  · PET 0/56/95: Hypermetabolic mass lesion right upper lobe with hypermetabolic mediastinal and right hilar lymphadenopathy. Hypermetabolic right adrenal mass is concerning for metastatic disease  · Mammogram 8/27/21: No mammographic evidence of malignancy  · Gamma Knife with Dr. Iliana Bowen 9/9/21  · MRI C Spine 9/12/21: C4 vertebral tumor/metastasis extending into the right foramen, without spinal stenosis. Multilevel cervical degenerative disc disease.  No fracture or subluxation  · Pembro 9/16/21 - Current  · XRT to C4 done 10/21/21  · MRI Brain 12/1/21: Postsurgical changes related to left posterior lateral temporal lobe mass resection, with minimal curvilinear enhancement but no definite residual or recurrent tumor. No new metastases  · MRI Cervical Spine 12/1/21: Decreased marrow replacement within the C4 vertebral body however there is persistent enhancing tissue within the right foramen and involvement of the right C4-5 facet compatible with extraosseous tumor spread. There is no new epidural component or new cord compression. No new metastatic disease to the cervical spine    STAGE: 4 with brain mets    History of Present Illness:   Carlos Medrano is a 63 y.o. 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 She had a follow up Brain MRI on 12/1/21 that showed postsurgical changes with no definite evidence of redisdual or recurrent tumor and no new metastases. She is here today for Cycle 3 of Keytruda. She reports that she feels well overall today. She is tolerating Keytruda well overall with fatigue, occasional chills, diarrhea and constipation. She states that she always feels cold. Her appetite is low overall but she is eating because she knows she needs to. She is drinking shakes. She continues to have mid back pain but is taking Oxycodone and Morphine per Palliative Care. She has occasional headaches but \"more like eye strain. \" She continues to smoke but has cut back significantly and is using Nicotine gum. She denies fever, mouth sores, cough, SOB, CP, nausea, and, vomiting. CBC, CMP and TSH are still pending today. She wants treatment today. She is here alone today.      FamHx:  Brother cancer H+N cancer    Past Medical History:   Diagnosis Date    Cancer [facility]) 2021    LUNGS AND BRAIN AND LYMPH NODES    Hypercholesterolemia     Metastatic carcinoma ([facility] [address] 8/19/2021      Past Surgical History:   Procedure Laterality Date    HX BREAST AUGMENTATION      HX HERNIA REPAIR      IMPLANT BREAST SILICONE/EQ Bilateral 3174    sub-pectoral      Social History     Tobacco Use    Smoking status: Current Every Day Smoker     Packs/day: 0.75     Years: 40.00     Pack years: 30.00    Smokeless tobacco: Never Used   Substance Use Topics    Alcohol use: Yes     Alcohol/week: 3.0 standard drinks     Types: 3 Cans of beer per week     Comment: socially      Family History   Problem Relation Age of Onset    Depression Mother     Obesity Mother     Heart Disease Mother     Mult Sclerosis Sister         END STAGE    Cancer Brother         TONSIL, WITH METS TO LIVER AND BONES    Heart Disease Maternal Grandfather     Cancer Paternal Grandmother     Anesth Problems Neg Hx      Current Outpatient Medications   Medication Sig    levETIRAcetam (KEPPRA) 500 mg tablet TAKE ONE TABLET BY MOUTH TWO TIMES A DAY    ALPRAZolam (XANAX) 1 mg tablet Take 1 Tablet by mouth two (2) times daily as needed for Anxiety. Max Daily Amount: 2 mg. TAKE 1 TABLET BY MOUTH EVERY 4 HOURS AS NEEDED FOR ANXIETY    morphine CR (MS CONTIN) 15 mg CR tablet Take 1 Tablet by mouth every twelve (12) hours for 30 days. Max Daily Amount: 30 mg.    albuterol (PROVENTIL HFA, VENTOLIN HFA, PROAIR HFA) 90 mcg/actuation inhaler Take 2 Puffs by inhalation every four (4) hours as needed for Wheezing.  ferrous sulfate (IRON PO) Take  by mouth.  MULTIVITAMIN PO Take  by mouth.  sertraline (ZOLOFT) 50 mg tablet Take 1 Tablet by mouth daily.  senna (Senna) 8.6 mg tablet Take 2 Tablets by mouth daily.  OTHER Gentle lax    naloxone (Narcan) 4 mg/actuation nasal spray Use 1 spray intranasally, then discard. Repeat with new spray every 2 min as needed for opioid overdose symptoms, alternating nostrils.  polyethylene glycol (Miralax) 17 gram/dose powder Take 17 g by mouth as needed for Constipation.  simethicone (GAS-X) 125 mg capsule Take 125 mg by mouth daily as needed for Flatulence.  acetaminophen (TYLENOL) 325 mg tablet Take 2 Tablets by mouth every six (6) hours as needed for Pain.     tiotropium (Spiriva with HandiHaler) 18 mcg inhalation capsule Take 1 Capsule by inhalation daily.  medroxyPROGESTERone (Provera) 5 mg tablet Take 5 mg by mouth daily.  rosuvastatin (CRESTOR) 10 mg tablet TAKE 1 TABLET BY MOUTH EVERY DAY    dexAMETHasone (DECADRON) 4 mg tablet Take 1 tab PO every 12 hours tonight then 1/2 a tab PO every 12 hours x 2 days then 1/2 a tab daily x 2 days then stop. (Patient not taking: Reported on 9/27/2021)    varenicline (Chantix) 1 mg tablet Take 1 mg by mouth two (2) times daily (after meals). (Patient not taking: Reported on 9/27/2021)    estradioL (Estrace) 0.5 mg tablet Take 0.5 mg by mouth daily. (Patient not taking: Reported on 10/13/2021)     No current facility-administered medications for this visit. No Known Allergies     Review of Systems:  A complete review of systems was obtained, negative except as described above and as reported on ROS sheet scanned into system. Physical Exam:     Visit Vitals  BP (!) 92/57 (BP 1 Location: Left upper arm, BP Patient Position: Sitting)   Pulse 96   Temp (!) 96.7 °F (35.9 °C) (Temporal)   Ht 5' 6\" (1.676 m)   Wt 144 lb 9.6 oz (65.6 kg)   SpO2 97%   BMI 23.34 kg/m²     ECOG PS:  1  General: No distress  Eyes: Anicteric sclerae  HENT: Atraumatic, wearing a mask  Neck: Supple  Respiratory: CTAB, normal respiratory effort  CV: Normal rate, regular rhythm, no murmurs, no peripheral edema  GI: Soft, nontender, nondistended, no masses  MS: Normal gait and station. Digits without clubbing or cyanosis. Skin: No rashes, ecchymoses, or petechiae. Normal temperature, turgor, and texture. Psych: Alert, oriented, appropriate affect, normal judgment/insight  Breast: Not examined today.  Prior Visit: No masses b/l, implants b/l   Neuro: Non focal    Results:     Lab Results   Component Value Date/Time    WBC 15.5 (H) 12/16/2021 10:09 AM    HGB 10.2 (L) 12/16/2021 10:09 AM    HCT 34.7 (L) 12/16/2021 10:09 AM    PLATELET 300 (H) 89/55/0615 10:09 AM    MCV 86.3 12/16/2021 10:09 AM    ABS. NEUTROPHILS PENDING 12/16/2021 10:09 AM     Lab Results   Component Value Date/Time    Sodium 137 12/16/2021 10:09 AM    Potassium 4.7 12/16/2021 10:09 AM    Chloride 107 12/16/2021 10:09 AM    CO2 25 12/16/2021 10:09 AM    Glucose 79 12/16/2021 10:09 AM    BUN 9 12/16/2021 10:09 AM    Creatinine 0.70 12/16/2021 10:09 AM    GFR est AA >60 12/16/2021 10:09 AM    GFR est non-AA >60 12/16/2021 10:09 AM    Calcium 9.9 12/16/2021 10:09 AM    Glucose (POC) 109 08/16/2021 08:39 AM     Lab Results   Component Value Date/Time    Bilirubin, total 0.3 12/16/2021 10:09 AM    ALT (SGPT) 17 12/16/2021 10:09 AM    Alk. phosphatase 148 (H) 12/16/2021 10:09 AM    Protein, total 7.5 12/16/2021 10:09 AM    Albumin 3.5 12/16/2021 10:09 AM    Globulin 4.0 12/16/2021 10:09 AM     CT Chest 7/23/21  IMPRESSION:  Spiculated right upper lobe mass with positive right hilar and mediastinal  adenopathy. Probable hepatic and possible left adrenal metastases. Recommend  PET/CT. CT Chest 7/30/21  IMPRESSION:  Right upper lobe pulmonary nodule with mediastinal and right hilar  lymphadenopathy, highly suspicious for primary pulmonary neoplasm. 2 mm right  middle lobe pulmonary nodule. Suspicious masses in the right adrenal gland and  liver. Brain MRI 8/3/21  IMPRESSION:  1. 2.5 cm left temporal lobe mass with subacute hemorrhage. Considering  patient's history this is most likely metastasis. 2. Mild nonspecific white matter T2 hyperintensity may be related to mild  chronic small vessel ischemic change. CT Head 8/11/21  IMPRESSION:  2.5 cm left temporal lobe enhancing mass.     8/11/21  FINAL PATHOLOGIC DIAGNOSIS:   1.  Brain, left temporal tumor, biopsy:        Metastatic poorly differentiated carcinoma   See comment   2.  Brain, left temporal tumor, excision:        Metastatic poorly differentiated carcinoma     Comment   Immunohistochemical stains reveal reactivity for CK7 and NATHANIEL-3, with   patchy/focal p40 staining. ER, ID, HER2, TTF-1, MART-1, SOX-10, CK20, and   CDX-2 stains are negative in the malignant cells.     The patient's recent lung/mediastinal lymph node biopsies are reviewed and   the current tumor shares some morphologic characteristics with the   malignant cells in the 4R lymph node cell block ([id number]), however   evaluation is limited by extensive necrosis. A subset of pulmonary   adenocarcinomas show NATHANIEL-3 expression (approximately 12%), therefore   metastatic carcinoma from a lung primary is still a possibility, however   NATHANIEL-3 expression is much more commonly seen in breast and urothelial   primaries, which should be reasonably excluded clinically. Note is made that the patient's recent lung biopsy was sent for molecular   testing which is pending, therefore additional testing is not requested on   this specimen. If needed, tumor material is available in block 2A of the   current specimen for ancillary testing. These findings were discussed with Dr. Theron Bass via [employer]   at 2:30 p.m. on 8/13/2021. CT Head 8/12/21  IMPRESSION:  Recent left temporoparietal craniotomy with tumor resection. No  operative complications    PET Results (most recent):  Results from Hospital Encounter encounter on 08/16/21    PET/CT TUMOR IMAGE SKULL THIGH W (INI)    Addendum 8/24/2021  2:23 PM  Addendum: There is a focus of increased tracer activity corresponding to a 5 mm  lytic lesion in the C4 vertebral body, compatible with metastatic disease. Narrative  PET/CT SCAN    PROCEDURE: Following IV injection of 9.98 mCi 18 Fluoro 2 deoxyglucose (FDG) and  a standard uptake delay, PET imaging is performed from the skull vertex to mid  thigh and axial, sagittal and coronal images were acquired. Unenhanced CT is  obtained for anatomic localization, and attenuation correction of the PET scan. Patient preprocedure blood glucose level: 109mg/dL.     CORRELATIVE IMAGING STUDIES: Chest CT 7/30/2021. COMPARISON PET: None    HISTORY: The study is requested for staging metastatic poorly differentiated  carcinoma. Biopsy confirmed diagnosis. FINDINGS:    HEAD/NECK: Photopenia corresponds to the left frontal postoperative changes. CHEST: Increased tracer activity corresponds to the lesion in the right upper  lobe, maximum SUV is 7.3. Additionally, there is hypermetabolic right  paratracheal, precarinal, and right hilar lymphadenopathy. ABDOMEN/PELVIS: Hypermetabolic right adrenal mass, maximum SUV is 9.3. There is  no abnormal uptake to correspond to the small hypodense lesion in the liver. SKELETON: No foci of abnormal hypermetabolism in the axial and visualized  appendicular skeleton. Impression  Hypermetabolic mass lesion right upper lobe with hypermetabolic  mediastinal and right hilar lymphadenopathy. Hypermetabolic right adrenal mass  is concerning for metastatic disease. [facility] Results (most recent):  Results from [place] encounter on 08/27/21    [facility] 3D HEBER W MAMMO BI SCREENING INCL CAD    Narrative  STUDY: Bilateral digital screening mammogram with 3-D tomosynthesis    INDICATION:  Screening. COMPARISON: 2013    BREAST COMPOSITION: There are scattered areas of fibroglandular density. FINDINGS: Bilateral digital screening mammography was performed and is  interpreted in conjunction with a computer assisted detection (CAD) system. Additionally, tomosynthesis of both breasts in the CC and MLO projections was  performed. Bilateral implant displaced views are performed. Bilateral  submuscular implants are stable. No suspicious masses or calcifications are  identified. There has been no significant change. Impression  BI-RADS 2: Benign. No mammographic evidence of malignancy. RECOMMENDATIONS:  Next screening mammogram is recommended in one year. The patient will be notified of these results. MRI Cervical Spine 9/12/21  IMPRESSION:  1.  C4 vertebral tumor/metastasis extending into the right foramen, without  spinal stenosis. 2. Multilevel cervical degenerative disc disease. 3. No fracture or subluxation. MRI Results (most recent):  Results from [place] encounter on 12/01/21    MRI CERV SPINE W WO CONT    Narrative  EXAM: MRI CERV SPINE W WO CONT    INDICATION: . Secondary malignant neoplasm of bone    Exam:  MRI cervical spine. Comparisons:  8/16/2021, 9/21/2021    Sequences:  Sagittal T1, T2, and STIR. Axial T1, T2. After the intravenous  administration of Field 1  mL of Dotarem sagittal and axial T1-weighted images  were obtained . FINDINGS:  Alignment is normal.  Again demonstrated is abnormal marrow signal  within the C4 vertebral body. This has decreased in size now involving the  posterior half of the vertebral body. There are areas of cortical bone loss. Again demonstrated is extension of tumor into the right C4-C5 foramen and  involvement of the right C4-C5 facets. Overall marrow signal is decreased but no  other areas of definite tumor involvement are demonstrated. There is multilevel  endplate degenerative change. The visualized posterior fossa and cord signal  are normal.  Paraspinous soft tissues are within normal limits. There is mild multilevel degenerative change with disc height loss and spurring  which has not progressed in the interval.    Impression  1. Decreased marrow replacement within the C4 vertebral body however there is  persistent enhancing tissue within the right foramen and involvement of the  right C4-5 facet compatible with extraosseous tumor spread. There is no new  epidural component or new cord compression  2. No new metastatic disease to the cervical spine    Brain MRI 12/1/21  IMPRESSION:  Postsurgical changes related to left posterior lateral temporal lobe mass  resection, with minimal curvilinear enhancement but no definite residual or  recurrent tumor.  No new metastases    Records reviewed and summarized above. Pathology report(s) reviewed above. Radiology report(s) reviewed above. Assessment/PLAN:     1)  Stage 4 PD Carcinoma Nonspecific Path likely Lung Primary with Brain/Bone Mets    Spiculated RUL lung mass on CT 7/21  Post neurosurgery 8/11/21 and path likely lung could be breast  Since likely lung and PDL1 90% and can do immunotherapy alone. Prior molecular testing negative except for PD-L1 90%. PET +in lung and mediastinal LNs/possible adrenal met. She had Gamma Knife with Dr. Marlon Mi on 9/9/21. She had a mammogram that was negative. Patient started [address] on 9/16/21. She is here today or Cycle 3 of Keytruda. She is tolerating treatment well overall with some fatigue, chills, diarrhea/constipation. Side effect management reviewed today. Side effects are tolerable per patient. She is clinically stable today and doing well overall. Labs (CBC, CMP, and TSH) reviewed today. Patient is ready for treatment today. Follow up CTs ordered today to evaluate response to treatment. Follow up in 6 weeks with Cycle 4. Patient agrees with plan. Patient does not want COVID vaccine. Reviewed higher risk of getting COVID/delta variant and dying of COVID due to no vaccine. Goal of care is palliative. 2) Brain Metastases   Post neurosurgery. She had Gamma Knife with Dr. Marlon Mi on 9/9/21. Brain MRI 12/1/21 was stable. Personally reviewed MRI. Discussed MRI with patient today. 3) C-Spine Bon Metastases   Completed XRT on 10/21/21. 4) Cancer Related Pain   On Oxycodone and Morphine per Palliative Care. Pain is controlled per patient. 5) Smoker  She is using Nicotine gum. She has cut back significantly. Offered smoking cessation class with NN but patient declined at this time. Management per PCP. 6) Chronic Anxiety  She takes Xanax PRN per PCP. 7) Occasional Headaches  Controlled with pain medications.      8) Management of High Risk Medications - Keytruda  Toxicities include Grade 1 Fatigue, Grade 1 Chills, Grade 1 Diarrhea/Constipation. Reviewed side effect management today. Labs (CBC, CMP and TSH) reviewed today. No dose adjustments needed today. Will continue to monitor side effects. 9) Psychosocial  Mood good, coping well with family support. 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 She is  with kids/grandkids, was in [employer]. She has two ex husbands. SW/NN support as needed. She is here alone today. Call if questions. Follow up in 6 weeks with Cycle 4. This patient was seen in conjunction with Harvey Siddiqui NP. I personally performed a face to face diagnostic evaluation on this patient. I personally reviewed the history and performed the key points on the exam.   I personally reviewed all points in the assessment and created treatment plan with the patient. Specifically, pt seen by me today  Doing well overall  Tolerating immunotherapy with fatigue  Labs personally reviewed today. Proceed with IO as ordered with monitoring. Pt agreeable to plan. I appreciate the opportunity to participate in Ms. Ely Fonseca's care.     Signed By: Alice Schlatter, DO

## 2021-12-16 NOTE — PROGRESS NOTES
Vargas Riggins is a 63 y.o. female  Chief Complaint   Patient presents with    Follow-up      Metastatic Cancer likely Lung Primary     1. Have you been to the ER, urgent care clinic since your last visit? Hospitalized since your last visit? No.    2. Have you seen or consulted any other health care providers outside of the [address] [place] [address] since your last visit? Include any pap smears or colon screening. No.      Patient states she feels very weak and no energy.

## 2021-12-16 NOTE — PROGRESS NOTES
[facility]fusion [facility] - Chemotherapy Progress Note    1000 Pt admit to [facility] for [address] ambulatory in stable condition. Assessment completed. Pt reports fatigue and 12 lb weight loss since last treatment. No new concerns voiced. PIV access established in L arm with positive blood return. Labs drawn per order and sent. Line flushed, clamped, Curos Cap applied to end clave. Pt upstairs to MD appointment. Patient denies SOB, fever, cough, general not feeling well. Patient denies recent exposure to someone who has tested positive for COVID-19. Patient  denies having contact with anyone who has a pending COVID test.       Visit Vitals  BP (!) 92/57   Pulse 99   Temp (!) 96.7 °F (35.9 °C)   Resp 18   Ht 5' 6\" (1.676 m)   Wt 65.6 kg (144 lb 9.6 oz)   Breastfeeding No   BMI 23.34 kg/m²       Medications Administered     0.9% sodium chloride infusion     Admin Date  12/16/2021 Action  New Bag Dose  25 mL/hr Rate  25 mL/hr Route  IntraVENous Administered By  Yarelis Grover RN          pembrolizumab [facility]) 400 mg in 0.9% sodium chloride 100 mL, overfill volume 10 mL IVPB     Admin Date  12/16/2021 Action  New Bag Dose  400 mg Rate  252 mL/hr Route  IntraVENous Administered By  Yarelis Grover RN                  1330 Pt tolerated treatment well. PIV removed at discharge. D/c home ambulatory in no distress. Pt aware of next [facility] appointment scheduled for 01/27/2022.     Recent Results (from the past 12 hour(s))   CBC WITH AUTOMATED DIFF    Collection Time: 12/16/21 10:09 AM   Result Value Ref Range    WBC 15.5 (H) 3.6 - 11.0 K/uL    RBC 4.02 3.80 - 5.20 M/uL    HGB 10.2 (L) 11.5 - 16.0 g/dL    HCT 34.7 (L) 35.0 - 47.0 %    MCV 86.3 80.0 - 99.0 FL    MCH 25.4 (L) 26.0 - 34.0 PG    MCHC 29.4 (L) 30.0 - 36.5 g/dL    RDW 16.8 (H) 11.5 - 14.5 %    PLATELET 285 (H) 948 - 400 K/uL    MPV 10.3 8.9 - 12.9 FL    NRBC 0.0 0  WBC    ABSOLUTE NRBC 0.00 0.00 - 0.01 K/uL    NEUTROPHILS 76 (H) 32 - 75 % LYMPHOCYTES 15 12 - 49 %    MONOCYTES 6 5 - 13 %    EOSINOPHILS 1 0 - 7 %    BASOPHILS 1 0 - 1 %    IMMATURE GRANULOCYTES 1 (H) 0.0 - 0.5 %    ABS. NEUTROPHILS 11.7 (H) 1.8 - 8.0 K/UL    ABS. LYMPHOCYTES 2.3 0.8 - 3.5 K/UL    ABS. MONOCYTES 0.9 0.0 - 1.0 K/UL    ABS. EOSINOPHILS 0.2 0.0 - 0.4 K/UL    ABS. BASOPHILS 0.2 (H) 0.0 - 0.1 K/UL    ABS. IMM. GRANS. 0.2 (H) 0.00 - 0.04 K/UL    DF SMEAR SCANNED      RBC COMMENTS ANISOCYTOSIS  1+       METABOLIC PANEL, COMPREHENSIVE    Collection Time: 12/16/21 10:09 AM   Result Value Ref Range    Sodium 137 136 - 145 mmol/L    Potassium 4.7 3.5 - 5.1 mmol/L    Chloride 107 97 - 108 mmol/L    CO2 25 21 - 32 mmol/L    Anion gap 5 5 - 15 mmol/L    Glucose 79 65 - 100 mg/dL    BUN 9 6 - 20 MG/DL    Creatinine 0.70 0.55 - 1.02 MG/DL    BUN/Creatinine ratio 13 12 - 20      GFR est AA >60 >60 ml/min/1.73m2    GFR est non-AA >60 >60 ml/min/1.73m2    Calcium 9.9 8.5 - 10.1 MG/DL    Bilirubin, total 0.3 0.2 - 1.0 MG/DL    ALT (SGPT) 17 12 - 78 U/L    AST (SGOT) 13 (L) 15 - 37 U/L    Alk.  phosphatase 148 (H) 45 - 117 U/L    Protein, total 7.5 6.4 - 8.2 g/dL    Albumin 3.5 3.5 - 5.0 g/dL    Globulin 4.0 2.0 - 4.0 g/dL    A-G Ratio 0.9 (L) 1.1 - 2.2     TSH 3RD GENERATION    Collection Time: 12/16/21 10:09 AM   Result Value Ref Range    TSH 1.97 0.36 - 3.74 uIU/mL

## 2021-12-28 NOTE — TELEPHONE ENCOUNTER
12/28/21 at 11:36 am - Called the patient and verified ID x 2. The patient stated that she had an infusion on 12/16/21 and then on 12/17/21 she was \"going up and down\" in the attic because her daughter was coming to visit on 12/18/21 and that it is just \"one little spot\" on her left thigh but it has been going on a week but it feels like it is muscle and believes it is because she was so active when she has not been as active as she was. Asked if there is any swelling or redness and the patient denied any swelling or redness. Informed the patient that Alexis Lynn NP will be made aware of all of the above information and if there are any updates this office will call her back however encouraged the patient to continue to monitor that spot and if the pain gets worse or any swelling or redness occurs to call this office. The patient verbalized understanding and denied any questions or concerns. JOAQUÍN Hou is agreeable to having the patient monitor and call if pain worsens or changes.

## 2021-12-29 NOTE — TELEPHONE ENCOUNTER
Triage for Controlled Substance Refill Request    Pain Diagnosis: _    Last Outpatient Visit: _11/10/2021    Next Outpatient Visit: _1/3/2022    Reason for refill needed outside of office visit? Appointment not scheduled prior to need for scheduled refill      Pharmacy: _[facility] - [address]    Medication:ALPRAZolam (XANAX) 1 mg tablet       Dose and directions: Take 1 Tablet by mouth two (2) times daily as needed for Anxiety. Number dispensed:11/29/2021  Date filled ( or Pharmacy):  # left:    Medication:morphine CR (MS CONTIN) 15 mg CR tablet       Dose and directions: Take 1 Tablet by mouth every twelve (12) hours for 30 days. Number dispensed:60  Date filled ( or Pharmacy):11/29/2021  #left:    Medication:oxyCODONE IR (ROXICODONE) 5 mg immediate release tablet       Dose and directions: Take 1 Tablet by mouth every four (4) hours as needed for Pain for up to 20 days  Number dispensed:120  Date filled ( or Pharmacy):11/10/2021  #left:       reviewed: _yes    Date of Urine Drug Screen:  _    Opioid Safety Handout given:  _yes    Appropriate for refill:  _Medication pending for refill    Action:  _ Pending

## 2021-12-29 NOTE — TELEPHONE ENCOUNTER
The patient called and said that she needs 3 prescriptions filled. She said that she uses the [address]     Xanax - 0 pills left  Morphine - 8 pills left  Oxycodone - 15 pills left

## 2022-01-01 ENCOUNTER — TELEPHONE (OUTPATIENT)
Dept: PALLATIVE CARE | Age: 62
End: 2022-01-01

## 2022-01-01 ENCOUNTER — HOME CARE VISIT (OUTPATIENT)
Dept: SCHEDULING | Facility: HOME HEALTH | Age: 62
End: 2022-01-01
Payer: COMMERCIAL

## 2022-01-01 ENCOUNTER — HOME CARE VISIT (OUTPATIENT)
Dept: HOSPICE | Facility: HOSPICE | Age: 62
End: 2022-01-01
Payer: COMMERCIAL

## 2022-01-01 ENCOUNTER — HOSPITAL ENCOUNTER (OUTPATIENT)
Dept: CT IMAGING | Age: 62
Discharge: HOME | End: 2022-02-23

## 2022-01-01 ENCOUNTER — PATIENT OUTREACH (OUTPATIENT)
Dept: OTHER | Age: 62
End: 2022-01-01

## 2022-01-01 ENCOUNTER — HOSPITAL ENCOUNTER (OUTPATIENT)
Dept: CT IMAGING | Age: 62
Discharge: HOME | End: 2022-02-23
Payer: COMMERCIAL

## 2022-01-01 ENCOUNTER — APPOINTMENT (OUTPATIENT)
Dept: INFUSION THERAPY | Age: 62
End: 2022-01-01

## 2022-01-01 ENCOUNTER — OFFICE VISIT (OUTPATIENT)
Dept: ONCOLOGY | Age: 62
End: 2022-01-01
Payer: COMMERCIAL

## 2022-01-01 ENCOUNTER — HOSPICE ADMISSION (OUTPATIENT)
Dept: HOSPICE | Facility: HOSPICE | Age: 62
End: 2022-01-01

## 2022-01-01 ENCOUNTER — HOME CARE VISIT (OUTPATIENT)
Dept: SCHEDULING | Facility: HOME HEALTH | Age: 62
End: 2022-01-01

## 2022-01-01 ENCOUNTER — HOSPITAL ENCOUNTER (OUTPATIENT)
Dept: CT IMAGING | Age: 62
Discharge: HOME | End: 2022-01-17

## 2022-01-01 ENCOUNTER — TELEPHONE (OUTPATIENT)
Dept: ONCOLOGY | Age: 62
End: 2022-01-01

## 2022-01-01 ENCOUNTER — VIRTUAL VISIT (OUTPATIENT)
Dept: PALLATIVE CARE | Age: 62
End: 2022-01-01
Payer: COMMERCIAL

## 2022-01-01 ENCOUNTER — DOCUMENTATION ONLY (OUTPATIENT)
Dept: ONCOLOGY | Age: 62
End: 2022-01-01

## 2022-01-01 ENCOUNTER — VIRTUAL VISIT (OUTPATIENT)
Dept: ONCOLOGY | Age: 62
End: 2022-01-01
Payer: COMMERCIAL

## 2022-01-01 ENCOUNTER — HOSPICE ADMISSION (OUTPATIENT)
Dept: HOSPICE | Facility: HOSPICE | Age: 62
End: 2022-01-01
Payer: COMMERCIAL

## 2022-01-01 ENCOUNTER — HOSPITAL ENCOUNTER (INPATIENT)
Age: 62
LOS: 1 days | End: 2022-06-16
Attending: FAMILY MEDICINE | Admitting: FAMILY MEDICINE

## 2022-01-01 ENCOUNTER — HOSPITAL ENCOUNTER (OUTPATIENT)
Dept: INFUSION THERAPY | Age: 62
Discharge: HOME | End: 2022-02-28
Payer: COMMERCIAL

## 2022-01-01 ENCOUNTER — HOSPITAL ENCOUNTER (OUTPATIENT)
Dept: INFUSION THERAPY | Age: 62
Discharge: HOME | End: 2022-01-27

## 2022-01-01 ENCOUNTER — HOSPITAL ENCOUNTER (OUTPATIENT)
Dept: INFUSION THERAPY | Age: 62
End: 2022-01-01

## 2022-01-01 VITALS
DIASTOLIC BLOOD PRESSURE: 46 MMHG | DIASTOLIC BLOOD PRESSURE: 46 MMHG | SYSTOLIC BLOOD PRESSURE: 88 MMHG | RESPIRATION RATE: 18 BRPM | HEART RATE: 91 BPM | BODY MASS INDEX: 18.56 KG/M2 | WEIGHT: 115 LBS | RESPIRATION RATE: 20 BRPM | SYSTOLIC BLOOD PRESSURE: 82 MMHG | HEART RATE: 88 BPM

## 2022-01-01 VITALS
OXYGEN SATURATION: 98 % | SYSTOLIC BLOOD PRESSURE: 100 MMHG | WEIGHT: 145.5 LBS | TEMPERATURE: 96.4 F | HEIGHT: 66 IN | DIASTOLIC BLOOD PRESSURE: 50 MMHG | BODY MASS INDEX: 23.38 KG/M2 | HEART RATE: 86 BPM

## 2022-01-01 VITALS — DIASTOLIC BLOOD PRESSURE: 40 MMHG | RESPIRATION RATE: 18 BRPM | SYSTOLIC BLOOD PRESSURE: 84 MMHG | HEART RATE: 78 BPM

## 2022-01-01 VITALS — RESPIRATION RATE: 18 BRPM | SYSTOLIC BLOOD PRESSURE: 90 MMHG | HEART RATE: 82 BPM | DIASTOLIC BLOOD PRESSURE: 54 MMHG

## 2022-01-01 VITALS — RESPIRATION RATE: 18 BRPM | SYSTOLIC BLOOD PRESSURE: 84 MMHG | DIASTOLIC BLOOD PRESSURE: 56 MMHG | HEART RATE: 77 BPM

## 2022-01-01 VITALS — DIASTOLIC BLOOD PRESSURE: 46 MMHG | SYSTOLIC BLOOD PRESSURE: 82 MMHG | HEART RATE: 77 BPM | RESPIRATION RATE: 18 BRPM

## 2022-01-01 VITALS — SYSTOLIC BLOOD PRESSURE: 82 MMHG | DIASTOLIC BLOOD PRESSURE: 40 MMHG | HEART RATE: 88 BPM | RESPIRATION RATE: 18 BRPM

## 2022-01-01 VITALS — HEART RATE: 67 BPM | RESPIRATION RATE: 18 BRPM | DIASTOLIC BLOOD PRESSURE: 42 MMHG | SYSTOLIC BLOOD PRESSURE: 88 MMHG

## 2022-01-01 VITALS
OXYGEN SATURATION: 100 % | SYSTOLIC BLOOD PRESSURE: 100 MMHG | RESPIRATION RATE: 18 BRPM | DIASTOLIC BLOOD PRESSURE: 50 MMHG | HEART RATE: 85 BPM | TEMPERATURE: 96.4 F

## 2022-01-01 VITALS
TEMPERATURE: 96.8 F | RESPIRATION RATE: 18 BRPM | DIASTOLIC BLOOD PRESSURE: 54 MMHG | SYSTOLIC BLOOD PRESSURE: 91 MMHG | HEART RATE: 80 BPM

## 2022-01-01 VITALS
OXYGEN SATURATION: 96 % | HEART RATE: 85 BPM | SYSTOLIC BLOOD PRESSURE: 88 MMHG | DIASTOLIC BLOOD PRESSURE: 62 MMHG | RESPIRATION RATE: 16 BRPM

## 2022-01-01 VITALS — RESPIRATION RATE: 20 BRPM | DIASTOLIC BLOOD PRESSURE: 46 MMHG | SYSTOLIC BLOOD PRESSURE: 82 MMHG | HEART RATE: 63 BPM

## 2022-01-01 VITALS
OXYGEN SATURATION: 57 % | TEMPERATURE: 100.8 F | SYSTOLIC BLOOD PRESSURE: 64 MMHG | HEART RATE: 57 BPM | DIASTOLIC BLOOD PRESSURE: 40 MMHG | RESPIRATION RATE: 16 BRPM

## 2022-01-01 VITALS — HEART RATE: 83 BPM | SYSTOLIC BLOOD PRESSURE: 80 MMHG | RESPIRATION RATE: 18 BRPM | DIASTOLIC BLOOD PRESSURE: 52 MMHG

## 2022-01-01 DIAGNOSIS — R06.02 SHORTNESS OF BREATH: ICD-10-CM

## 2022-01-01 DIAGNOSIS — C79.9 METASTATIC CARCINOMA (HCC): ICD-10-CM

## 2022-01-01 DIAGNOSIS — F41.9 ANXIETY: ICD-10-CM

## 2022-01-01 DIAGNOSIS — Z72.820 POOR SLEEP: ICD-10-CM

## 2022-01-01 DIAGNOSIS — R19.7 DIARRHEA, UNSPECIFIED TYPE: Primary | ICD-10-CM

## 2022-01-01 DIAGNOSIS — C34.90 METASTATIC NON-SMALL CELL LUNG CANCER (HCC): ICD-10-CM

## 2022-01-01 DIAGNOSIS — R93.89 ABNORMAL CT OF THE CHEST: ICD-10-CM

## 2022-01-01 DIAGNOSIS — Z51.5 HOSPICE CARE PATIENT: ICD-10-CM

## 2022-01-01 DIAGNOSIS — K58.2 IRRITABLE BOWEL SYNDROME WITH BOTH CONSTIPATION AND DIARRHEA: ICD-10-CM

## 2022-01-01 DIAGNOSIS — R10.84 ABDOMINAL PAIN, GENERALIZED: Primary | ICD-10-CM

## 2022-01-01 DIAGNOSIS — F32.A DEPRESSION, UNSPECIFIED DEPRESSION TYPE: ICD-10-CM

## 2022-01-01 DIAGNOSIS — M89.8X1 PAIN OF RIGHT SCAPULA: ICD-10-CM

## 2022-01-01 DIAGNOSIS — C34.90 NON-SMALL CELL LUNG CANCER, UNSPECIFIED LATERALITY (HCC): ICD-10-CM

## 2022-01-01 DIAGNOSIS — M54.2 NECK PAIN: ICD-10-CM

## 2022-01-01 DIAGNOSIS — C79.51 BONE METASTASES (HCC): ICD-10-CM

## 2022-01-01 DIAGNOSIS — R10.84 ABDOMINAL PAIN, GENERALIZED: ICD-10-CM

## 2022-01-01 DIAGNOSIS — R19.7 DIARRHEA, UNSPECIFIED TYPE: ICD-10-CM

## 2022-01-01 DIAGNOSIS — C79.31 BRAIN METASTASES (HCC): ICD-10-CM

## 2022-01-01 DIAGNOSIS — F17.200 SMOKER: ICD-10-CM

## 2022-01-01 DIAGNOSIS — R53.83 FATIGUE, UNSPECIFIED TYPE: ICD-10-CM

## 2022-01-01 DIAGNOSIS — R14.0 ABDOMINAL BLOATING: ICD-10-CM

## 2022-01-01 DIAGNOSIS — F32.A DEPRESSION, UNSPECIFIED DEPRESSION TYPE: Primary | ICD-10-CM

## 2022-01-01 DIAGNOSIS — K52.9 COLITIS: Primary | ICD-10-CM

## 2022-01-01 DIAGNOSIS — K52.9 COLITIS: ICD-10-CM

## 2022-01-01 DIAGNOSIS — C34.90 METASTATIC NON-SMALL CELL LUNG CANCER (HCC): Primary | ICD-10-CM

## 2022-01-01 DIAGNOSIS — R45.1 RESTLESSNESS AND AGITATION: ICD-10-CM

## 2022-01-01 DIAGNOSIS — R56.9 SEIZURES (HCC): ICD-10-CM

## 2022-01-01 DIAGNOSIS — G89.3 CANCER RELATED PAIN: ICD-10-CM

## 2022-01-01 DIAGNOSIS — R11.0 NAUSEA: ICD-10-CM

## 2022-01-01 DIAGNOSIS — G89.3 CANCER ASSOCIATED PAIN: ICD-10-CM

## 2022-01-01 DIAGNOSIS — R53.0 NEOPLASTIC MALIGNANT RELATED FATIGUE: ICD-10-CM

## 2022-01-01 DIAGNOSIS — R10.9 RIGHT FLANK PAIN: Primary | ICD-10-CM

## 2022-01-01 DIAGNOSIS — M89.8X1 PAIN OF RIGHT SCAPULA: Primary | ICD-10-CM

## 2022-01-01 DIAGNOSIS — C79.9 METASTATIC CARCINOMA (HCC): Primary | ICD-10-CM

## 2022-01-01 DIAGNOSIS — Z51.12 ENCOUNTER FOR ANTINEOPLASTIC IMMUNOTHERAPY: ICD-10-CM

## 2022-01-01 DIAGNOSIS — R10.9 RIGHT FLANK PAIN: ICD-10-CM

## 2022-01-01 DIAGNOSIS — R63.4 WEIGHT LOSS: ICD-10-CM

## 2022-01-01 LAB
ALBUMIN SERPL-MCNC: 2.6 G/DL (ref 3.5–5)
ALBUMIN SERPL-MCNC: 2.9 G/DL (ref 3.5–5)
ALBUMIN/GLOB SERPL: 0.6 {RATIO} (ref 1.1–2.2)
ALBUMIN/GLOB SERPL: 0.7 {RATIO} (ref 1.1–2.2)
ALP SERPL-CCNC: 127 U/L (ref 45–117)
ALP SERPL-CCNC: 154 U/L (ref 45–117)
ALT SERPL-CCNC: 12 U/L (ref 12–78)
ALT SERPL-CCNC: 24 U/L (ref 12–78)
ANION GAP SERPL CALC-SCNC: 3 MMOL/L (ref 5–15)
ANION GAP SERPL CALC-SCNC: 3 MMOL/L (ref 5–15)
AST SERPL-CCNC: 14 U/L (ref 15–37)
AST SERPL-CCNC: 14 U/L (ref 15–37)
BASOPHILS # BLD: 0 K/UL (ref 0–0.1)
BASOPHILS # BLD: 0 K/UL (ref 0–0.1)
BASOPHILS NFR BLD: 0 % (ref 0–1)
BASOPHILS NFR BLD: 0 % (ref 0–1)
BILIRUB SERPL-MCNC: 0.2 MG/DL (ref 0.2–1)
BILIRUB SERPL-MCNC: 0.2 MG/DL (ref 0.2–1)
BUN SERPL-MCNC: 7 MG/DL (ref 6–20)
BUN SERPL-MCNC: 8 MG/DL (ref 6–20)
BUN/CREAT SERPL: 10 (ref 12–20)
BUN/CREAT SERPL: 12 (ref 12–20)
C DIFF TOX A+B STL QL IA: NEGATIVE
CALCIUM SERPL-MCNC: 9.4 MG/DL (ref 8.5–10.1)
CALCIUM SERPL-MCNC: 9.8 MG/DL (ref 8.5–10.1)
CHLORIDE SERPL-SCNC: 101 MMOL/L (ref 97–108)
CHLORIDE SERPL-SCNC: 107 MMOL/L (ref 97–108)
CO2 SERPL-SCNC: 27 MMOL/L (ref 21–32)
CO2 SERPL-SCNC: 28 MMOL/L (ref 21–32)
CREAT SERPL-MCNC: 0.65 MG/DL (ref 0.55–1.02)
CREAT SERPL-MCNC: 0.68 MG/DL (ref 0.55–1.02)
DIFFERENTIAL METHOD BLD: ABNORMAL
DIFFERENTIAL METHOD BLD: ABNORMAL
EOSINOPHIL # BLD: 0 K/UL (ref 0–0.4)
EOSINOPHIL # BLD: 0.2 K/UL (ref 0–0.4)
EOSINOPHIL NFR BLD: 0 % (ref 0–7)
EOSINOPHIL NFR BLD: 1 % (ref 0–7)
ERYTHROCYTE [DISTWIDTH] IN BLOOD BY AUTOMATED COUNT: 18.6 % (ref 11.5–14.5)
ERYTHROCYTE [DISTWIDTH] IN BLOOD BY AUTOMATED COUNT: 19.8 % (ref 11.5–14.5)
GLOBULIN SER CALC-MCNC: 4 G/DL (ref 2–4)
GLOBULIN SER CALC-MCNC: 4.2 G/DL (ref 2–4)
GLUCOSE SERPL-MCNC: 96 MG/DL (ref 65–100)
GLUCOSE SERPL-MCNC: 98 MG/DL (ref 65–100)
HCT VFR BLD AUTO: 33.6 % (ref 35–47)
HCT VFR BLD AUTO: 37 % (ref 35–47)
HGB BLD-MCNC: 10.3 G/DL (ref 11.5–16)
HGB BLD-MCNC: 11.8 G/DL (ref 11.5–16)
IMM GRANULOCYTES # BLD AUTO: 0 K/UL
IMM GRANULOCYTES # BLD AUTO: 0.2 K/UL (ref 0–0.04)
IMM GRANULOCYTES NFR BLD AUTO: 0 %
IMM GRANULOCYTES NFR BLD AUTO: 1 % (ref 0–0.5)
LYMPHOCYTES # BLD: 3.1 K/UL (ref 0.8–3.5)
LYMPHOCYTES # BLD: 4.1 K/UL (ref 0.8–3.5)
LYMPHOCYTES NFR BLD: 16 % (ref 12–49)
LYMPHOCYTES NFR BLD: 19 % (ref 12–49)
MCH RBC QN AUTO: 25.6 PG (ref 26–34)
MCH RBC QN AUTO: 25.8 PG (ref 26–34)
MCHC RBC AUTO-ENTMCNC: 30.7 G/DL (ref 30–36.5)
MCHC RBC AUTO-ENTMCNC: 31.9 G/DL (ref 30–36.5)
MCV RBC AUTO: 80.3 FL (ref 80–99)
MCV RBC AUTO: 84.2 FL (ref 80–99)
MONOCYTES # BLD: 1.1 K/UL (ref 0–1)
MONOCYTES # BLD: 1.2 K/UL (ref 0–1)
MONOCYTES NFR BLD: 5 % (ref 5–13)
MONOCYTES NFR BLD: 6 % (ref 5–13)
NEUTS SEG # BLD: 15.1 K/UL (ref 1.8–8)
NEUTS SEG # BLD: 16.3 K/UL (ref 1.8–8)
NEUTS SEG NFR BLD: 75 % (ref 32–75)
NEUTS SEG NFR BLD: 77 % (ref 32–75)
NRBC # BLD: 0 K/UL (ref 0–0.01)
NRBC # BLD: 0 K/UL (ref 0–0.01)
NRBC BLD-RTO: 0 PER 100 WBC
NRBC BLD-RTO: 0 PER 100 WBC
PLATELET # BLD AUTO: 511 K/UL (ref 150–400)
PLATELET # BLD AUTO: 711 K/UL (ref 150–400)
PMV BLD AUTO: 10 FL (ref 8.9–12.9)
PMV BLD AUTO: 10.3 FL (ref 8.9–12.9)
POTASSIUM SERPL-SCNC: 4.5 MMOL/L (ref 3.5–5.1)
POTASSIUM SERPL-SCNC: 4.8 MMOL/L (ref 3.5–5.1)
PROT SERPL-MCNC: 6.8 G/DL (ref 6.4–8.2)
PROT SERPL-MCNC: 6.9 G/DL (ref 6.4–8.2)
RBC # BLD AUTO: 3.99 M/UL (ref 3.8–5.2)
RBC # BLD AUTO: 4.61 M/UL (ref 3.8–5.2)
RBC MORPH BLD: ABNORMAL
RBC MORPH BLD: ABNORMAL
SODIUM SERPL-SCNC: 132 MMOL/L (ref 136–145)
SODIUM SERPL-SCNC: 137 MMOL/L (ref 136–145)
TSH SERPL DL<=0.05 MIU/L-ACNC: 3.94 UIU/ML (ref 0.36–3.74)
WBC # BLD AUTO: 19.6 K/UL (ref 3.6–11)
WBC # BLD AUTO: 21.7 K/UL (ref 3.6–11)

## 2022-01-01 PROCEDURE — 0651 HSPC ROUTINE HOME CARE

## 2022-01-01 PROCEDURE — 99215 OFFICE O/P EST HI 40 MIN: CPT | Performed by: INTERNAL MEDICINE

## 2022-01-01 PROCEDURE — HOSPICE MEDICATION HC HH HOSPICE MEDICATION

## 2022-01-01 PROCEDURE — 99214 OFFICE O/P EST MOD 30 MIN: CPT | Performed by: INTERNAL MEDICINE

## 2022-01-01 PROCEDURE — 36415 COLL VENOUS BLD VENIPUNCTURE: CPT

## 2022-01-01 PROCEDURE — 74011250636 HC RX REV CODE- 250/636: Performed by: FAMILY MEDICINE

## 2022-01-01 PROCEDURE — G0299 HHS/HOSPICE OF RN EA 15 MIN: HCPCS

## 2022-01-01 PROCEDURE — 96360 HYDRATION IV INFUSION INIT: CPT

## 2022-01-01 PROCEDURE — 0656 HSPC GENERAL INPATIENT

## 2022-01-01 PROCEDURE — G0155 HHCP-SVS OF CSW,EA 15 MIN: HCPCS

## 2022-01-01 PROCEDURE — 80053 COMPREHEN METABOLIC PANEL: CPT

## 2022-01-01 PROCEDURE — 85025 COMPLETE CBC W/AUTO DIFF WBC: CPT

## 2022-01-01 PROCEDURE — 84443 ASSAY THYROID STIM HORMONE: CPT

## 2022-01-01 PROCEDURE — 74011000250 HC RX REV CODE- 250: Performed by: NURSE PRACTITIONER

## 2022-01-01 PROCEDURE — 71260 CT THORAX DX C+: CPT | Performed by: NURSE PRACTITIONER

## 2022-01-01 PROCEDURE — 74011000250 HC RX REV CODE- 250: Performed by: FAMILY MEDICINE

## 2022-01-01 PROCEDURE — 74011250636 HC RX REV CODE- 250/636: Performed by: NURSE PRACTITIONER

## 2022-01-01 PROCEDURE — 3336500001 HSPC ELECTION

## 2022-01-01 PROCEDURE — 74177 CT ABD & PELVIS W/CONTRAST: CPT | Performed by: NURSE PRACTITIONER

## 2022-01-01 RX ORDER — LORAZEPAM 2 MG/ML
2 INJECTION INTRAMUSCULAR
Status: DISCONTINUED | OUTPATIENT
Start: 2022-01-01 | End: 2022-01-01

## 2022-01-01 RX ORDER — SODIUM CHLORIDE 9 MG/ML
10 INJECTION INTRAMUSCULAR; INTRAVENOUS; SUBCUTANEOUS AS NEEDED
Status: CANCELLED | OUTPATIENT
Start: 2022-01-01

## 2022-01-01 RX ORDER — ZOLPIDEM TARTRATE 12.5 MG/1
12.5 TABLET, FILM COATED, EXTENDED RELEASE ORAL
Qty: 30 TABLET | Refills: 0 | Status: SHIPPED | OUTPATIENT
Start: 2022-01-01 | End: 2022-01-01 | Stop reason: SDUPTHER

## 2022-01-01 RX ORDER — PREDNISONE 20 MG/1
1 TABLET ORAL
Qty: 42 TABLET | Refills: 0 | Status: SHIPPED | OUTPATIENT
Start: 2022-01-01 | End: 2022-01-01 | Stop reason: SDUPTHER

## 2022-01-01 RX ORDER — MORPHINE SULFATE 30 MG/1
30 TABLET, FILM COATED, EXTENDED RELEASE ORAL EVERY 12 HOURS
Qty: 60 TABLET | Refills: 0 | Status: SHIPPED | OUTPATIENT
Start: 2022-01-01 | End: 2022-01-01 | Stop reason: SDUPTHER

## 2022-01-01 RX ORDER — PREDNISONE 20 MG/1
1 TABLET ORAL
Qty: 42 TABLET | Refills: 0 | Status: SHIPPED | OUTPATIENT
Start: 2022-01-01 | End: 2022-01-01

## 2022-01-01 RX ORDER — OXYCODONE HYDROCHLORIDE 5 MG/1
10 TABLET ORAL
Qty: 150 TABLET | Refills: 0 | Status: SHIPPED | OUTPATIENT
Start: 2022-01-01 | End: 2022-01-01

## 2022-01-01 RX ORDER — OXYCODONE HYDROCHLORIDE 5 MG/1
5 TABLET ORAL
Qty: 120 TABLET | Refills: 0 | Status: SHIPPED | OUTPATIENT
Start: 2022-01-01 | End: 2022-01-01

## 2022-01-01 RX ORDER — GLYCOPYRROLATE 0.2 MG/ML
0.2 INJECTION INTRAMUSCULAR; INTRAVENOUS EVERY 4 HOURS
Status: DISCONTINUED | OUTPATIENT
Start: 2022-01-01 | End: 2022-01-01 | Stop reason: HOSPADM

## 2022-01-01 RX ORDER — MORPHINE SULFATE 15 MG/1
15 TABLET, FILM COATED, EXTENDED RELEASE ORAL EVERY 12 HOURS
Qty: 60 TABLET | Refills: 0 | Status: SHIPPED | OUTPATIENT
Start: 2022-01-01 | End: 2022-01-01 | Stop reason: DRUGHIGH

## 2022-01-01 RX ORDER — LORAZEPAM 2 MG/ML
2 INJECTION INTRAMUSCULAR EVERY 4 HOURS
Status: DISCONTINUED | OUTPATIENT
Start: 2022-01-01 | End: 2022-01-01

## 2022-01-01 RX ORDER — ALPRAZOLAM 1 MG/1
1 TABLET ORAL
Qty: 60 TABLET | Refills: 0 | Status: SHIPPED | OUTPATIENT
Start: 2022-01-01 | End: 2022-01-01 | Stop reason: SDUPTHER

## 2022-01-01 RX ORDER — HYDROMORPHONE HYDROCHLORIDE 2 MG/ML
3 INJECTION, SOLUTION INTRAMUSCULAR; INTRAVENOUS; SUBCUTANEOUS EVERY 4 HOURS
Status: DISCONTINUED | OUTPATIENT
Start: 2022-01-01 | End: 2022-01-01

## 2022-01-01 RX ORDER — HEPARIN 100 UNIT/ML
500 SYRINGE INTRAVENOUS AS NEEDED
Status: CANCELLED | OUTPATIENT
Start: 2022-01-01

## 2022-01-01 RX ORDER — KETOROLAC TROMETHAMINE 30 MG/ML
30 INJECTION, SOLUTION INTRAMUSCULAR; INTRAVENOUS
Status: DISCONTINUED | OUTPATIENT
Start: 2022-01-01 | End: 2022-01-01 | Stop reason: HOSPADM

## 2022-01-01 RX ORDER — ZOLPIDEM TARTRATE 12.5 MG/1
12.5 TABLET, FILM COATED, EXTENDED RELEASE ORAL
Qty: 30 TABLET | Refills: 0 | Status: SHIPPED | OUTPATIENT
Start: 2022-01-01

## 2022-01-01 RX ORDER — HYDROMORPHONE HYDROCHLORIDE 2 MG/ML
4 INJECTION, SOLUTION INTRAMUSCULAR; INTRAVENOUS; SUBCUTANEOUS
Status: DISCONTINUED | OUTPATIENT
Start: 2022-01-01 | End: 2022-01-01

## 2022-01-01 RX ORDER — DICYCLOMINE HYDROCHLORIDE 10 MG/1
10 CAPSULE ORAL
Qty: 120 CAPSULE | Refills: 0 | Status: SHIPPED | OUTPATIENT
Start: 2022-01-01 | End: 2022-01-01

## 2022-01-01 RX ORDER — DICYCLOMINE HYDROCHLORIDE 10 MG/1
10 CAPSULE ORAL
Qty: 120 CAPSULE | Refills: 0 | Status: SHIPPED | OUTPATIENT
Start: 2022-01-01 | End: 2022-01-01 | Stop reason: SDUPTHER

## 2022-01-01 RX ORDER — SODIUM CHLORIDE 0.9 % (FLUSH) 0.9 %
5-10 SYRINGE (ML) INJECTION AS NEEDED
Status: CANCELLED | OUTPATIENT
Start: 2022-01-01

## 2022-01-01 RX ORDER — HYDROMORPHONE HYDROCHLORIDE 2 MG/ML
4 INJECTION, SOLUTION INTRAMUSCULAR; INTRAVENOUS; SUBCUTANEOUS EVERY 4 HOURS
Status: DISCONTINUED | OUTPATIENT
Start: 2022-01-01 | End: 2022-01-01

## 2022-01-01 RX ORDER — HYDROMORPHONE HYDROCHLORIDE 2 MG/ML
6 INJECTION, SOLUTION INTRAMUSCULAR; INTRAVENOUS; SUBCUTANEOUS
Status: DISCONTINUED | OUTPATIENT
Start: 2022-01-01 | End: 2022-01-01 | Stop reason: HOSPADM

## 2022-01-01 RX ORDER — LORAZEPAM 2 MG/ML
4 INJECTION INTRAMUSCULAR
Status: DISCONTINUED | OUTPATIENT
Start: 2022-01-01 | End: 2022-01-01 | Stop reason: HOSPADM

## 2022-01-01 RX ORDER — GLYCOPYRROLATE 0.2 MG/ML
0.2 INJECTION INTRAMUSCULAR; INTRAVENOUS
Status: DISCONTINUED | OUTPATIENT
Start: 2022-01-01 | End: 2022-01-01

## 2022-01-01 RX ORDER — SODIUM CHLORIDE 0.9 % (FLUSH) 0.9 %
5-10 SYRINGE (ML) INJECTION AS NEEDED
Status: DISCONTINUED | OUTPATIENT
Start: 2022-01-01 | End: 2022-01-01 | Stop reason: HOSPADM

## 2022-01-01 RX ORDER — HYDROMORPHONE HYDROCHLORIDE 2 MG/ML
3 INJECTION, SOLUTION INTRAMUSCULAR; INTRAVENOUS; SUBCUTANEOUS
Status: DISCONTINUED | OUTPATIENT
Start: 2022-01-01 | End: 2022-01-01

## 2022-01-01 RX ORDER — SODIUM CHLORIDE 9 MG/ML
10 INJECTION INTRAMUSCULAR; INTRAVENOUS; SUBCUTANEOUS AS NEEDED
Status: DISCONTINUED | OUTPATIENT
Start: 2022-01-01 | End: 2022-01-01 | Stop reason: HOSPADM

## 2022-01-01 RX ORDER — DIPHENOXYLATE HYDROCHLORIDE AND ATROPINE SULFATE 2.5; .025 MG/1; MG/1
2 TABLET ORAL
Qty: 14 TABLET | Refills: 0 | Status: SHIPPED | OUTPATIENT
Start: 2022-01-01 | End: 2022-01-01

## 2022-01-01 RX ORDER — ONDANSETRON 4 MG/1
4-8 TABLET, ORALLY DISINTEGRATING ORAL
Qty: 60 TABLET | Refills: 1 | Status: SHIPPED | OUTPATIENT
Start: 2022-01-01

## 2022-01-01 RX ORDER — DICYCLOMINE HYDROCHLORIDE 10 MG/1
10 CAPSULE ORAL
Qty: 120 CAPSULE | Refills: 0 | Status: SHIPPED | OUTPATIENT
Start: 2022-01-01

## 2022-01-01 RX ORDER — MORPHINE SULFATE 30 MG/1
30 TABLET, FILM COATED, EXTENDED RELEASE ORAL EVERY 12 HOURS
Qty: 60 TABLET | Refills: 0 | Status: SHIPPED | OUTPATIENT
Start: 2022-01-01 | End: 2022-01-01

## 2022-01-01 RX ORDER — ALPRAZOLAM 1 MG/1
1 TABLET ORAL
Qty: 60 TABLET | Refills: 0 | Status: SHIPPED | OUTPATIENT
Start: 2022-01-01

## 2022-01-01 RX ORDER — FACIAL-BODY WIPES
10 EACH TOPICAL DAILY PRN
Status: DISCONTINUED | OUTPATIENT
Start: 2022-01-01 | End: 2022-01-01 | Stop reason: HOSPADM

## 2022-01-01 RX ORDER — QUETIAPINE FUMARATE 25 MG/1
TABLET, FILM COATED ORAL
Qty: 30 TABLET | Refills: 0 | Status: SHIPPED | OUTPATIENT
Start: 2022-01-01

## 2022-01-01 RX ORDER — ALPRAZOLAM 1 MG/1
1 TABLET ORAL
Qty: 60 TABLET | Refills: 0 | Status: CANCELLED | OUTPATIENT
Start: 2022-01-01

## 2022-01-01 RX ORDER — ALBUTEROL SULFATE 90 UG/1
2 AEROSOL, METERED RESPIRATORY (INHALATION)
Qty: 18 G | Refills: 1 | Status: SHIPPED | OUTPATIENT
Start: 2022-01-01

## 2022-01-01 RX ORDER — HEPARIN 100 UNIT/ML
500 SYRINGE INTRAVENOUS AS NEEDED
Status: DISCONTINUED | OUTPATIENT
Start: 2022-01-01 | End: 2022-01-01 | Stop reason: HOSPADM

## 2022-01-01 RX ORDER — METHYLPREDNISOLONE 4 MG/1
TABLET ORAL
Qty: 1 DOSE PACK | Refills: 0 | Status: SHIPPED | OUTPATIENT
Start: 2022-01-01 | End: 2022-01-01 | Stop reason: ALTCHOICE

## 2022-01-01 RX ORDER — SERTRALINE HYDROCHLORIDE 50 MG/1
50 TABLET, FILM COATED ORAL DAILY
Qty: 30 TABLET | Refills: 2 | Status: SHIPPED | OUTPATIENT
Start: 2022-01-01

## 2022-01-01 RX ADMIN — HYDROMORPHONE HYDROCHLORIDE 4 MG: 2 INJECTION, SOLUTION INTRAMUSCULAR; INTRAVENOUS; SUBCUTANEOUS at 08:24

## 2022-01-01 RX ADMIN — HYDROMORPHONE HYDROCHLORIDE 4 MG: 2 INJECTION, SOLUTION INTRAMUSCULAR; INTRAVENOUS; SUBCUTANEOUS at 21:29

## 2022-01-01 RX ADMIN — LORAZEPAM 2 MG: 2 INJECTION INTRAMUSCULAR; INTRAVENOUS at 23:14

## 2022-01-01 RX ADMIN — GLYCOPYRROLATE 0.2 MG: 0.2 INJECTION, SOLUTION INTRAMUSCULAR; INTRAVENOUS at 12:17

## 2022-01-01 RX ADMIN — HYDROMORPHONE HYDROCHLORIDE 4 MG: 2 INJECTION, SOLUTION INTRAMUSCULAR; INTRAVENOUS; SUBCUTANEOUS at 04:01

## 2022-01-01 RX ADMIN — HYDROMORPHONE HYDROCHLORIDE 3 MG: 2 INJECTION, SOLUTION INTRAMUSCULAR; INTRAVENOUS; SUBCUTANEOUS at 16:58

## 2022-01-01 RX ADMIN — HYDROMORPHONE HYDROCHLORIDE 4 MG: 2 INJECTION, SOLUTION INTRAMUSCULAR; INTRAVENOUS; SUBCUTANEOUS at 17:32

## 2022-01-01 RX ADMIN — LORAZEPAM 2 MG: 2 INJECTION INTRAMUSCULAR; INTRAVENOUS at 04:02

## 2022-01-01 RX ADMIN — LORAZEPAM 2 MG: 2 INJECTION INTRAMUSCULAR; INTRAVENOUS at 18:48

## 2022-01-01 RX ADMIN — KETOROLAC TROMETHAMINE 30 MG: 30 INJECTION, SOLUTION INTRAMUSCULAR at 08:37

## 2022-01-01 RX ADMIN — LORAZEPAM 2 MG: 2 INJECTION INTRAMUSCULAR; INTRAVENOUS at 00:41

## 2022-01-01 RX ADMIN — HYDROMORPHONE HYDROCHLORIDE 6 MG: 2 INJECTION, SOLUTION INTRAMUSCULAR; INTRAVENOUS; SUBCUTANEOUS at 11:36

## 2022-01-01 RX ADMIN — HYDROMORPHONE HYDROCHLORIDE 6 MG: 2 INJECTION, SOLUTION INTRAMUSCULAR; INTRAVENOUS; SUBCUTANEOUS at 12:16

## 2022-01-01 RX ADMIN — HYDROMORPHONE HYDROCHLORIDE 4 MG: 2 INJECTION, SOLUTION INTRAMUSCULAR; INTRAVENOUS; SUBCUTANEOUS at 00:41

## 2022-01-01 RX ADMIN — LORAZEPAM 2 MG: 2 INJECTION INTRAMUSCULAR; INTRAVENOUS at 21:30

## 2022-01-01 RX ADMIN — LORAZEPAM 4 MG: 2 INJECTION INTRAMUSCULAR; INTRAVENOUS at 10:24

## 2022-01-01 RX ADMIN — SODIUM CHLORIDE, PRESERVATIVE FREE 10 ML: 5 INJECTION INTRAVENOUS at 14:05

## 2022-01-01 RX ADMIN — LORAZEPAM 2 MG: 2 INJECTION INTRAMUSCULAR; INTRAVENOUS at 20:43

## 2022-01-01 RX ADMIN — HYDROMORPHONE HYDROCHLORIDE 4 MG: 2 INJECTION, SOLUTION INTRAMUSCULAR; INTRAVENOUS; SUBCUTANEOUS at 04:48

## 2022-01-01 RX ADMIN — LORAZEPAM 4 MG: 2 INJECTION INTRAMUSCULAR; INTRAVENOUS at 12:16

## 2022-01-01 RX ADMIN — LORAZEPAM 2 MG: 2 INJECTION INTRAMUSCULAR; INTRAVENOUS at 16:57

## 2022-01-01 RX ADMIN — GLYCOPYRROLATE 0.2 MG: 0.2 INJECTION, SOLUTION INTRAMUSCULAR; INTRAVENOUS at 18:52

## 2022-01-01 RX ADMIN — LORAZEPAM 4 MG: 2 INJECTION INTRAMUSCULAR; INTRAVENOUS at 11:35

## 2022-01-01 RX ADMIN — LORAZEPAM 2 MG: 2 INJECTION INTRAMUSCULAR; INTRAVENOUS at 08:25

## 2022-01-01 RX ADMIN — SODIUM CHLORIDE 1000 ML: 9 INJECTION, SOLUTION INTRAVENOUS at 14:11

## 2022-01-01 RX ADMIN — LORAZEPAM 2 MG: 2 INJECTION INTRAMUSCULAR; INTRAVENOUS at 04:47

## 2022-01-01 RX ADMIN — GLYCOPYRROLATE 0.2 MG: 0.2 INJECTION, SOLUTION INTRAMUSCULAR; INTRAVENOUS at 08:25

## 2022-01-01 RX ADMIN — HYDROMORPHONE HYDROCHLORIDE 4 MG: 2 INJECTION, SOLUTION INTRAMUSCULAR; INTRAVENOUS; SUBCUTANEOUS at 20:43

## 2022-01-01 RX ADMIN — HYDROMORPHONE HYDROCHLORIDE 4 MG: 2 INJECTION, SOLUTION INTRAMUSCULAR; INTRAVENOUS; SUBCUTANEOUS at 20:07

## 2022-01-01 RX ADMIN — LORAZEPAM 2 MG: 2 INJECTION INTRAMUSCULAR; INTRAVENOUS at 20:07

## 2022-01-01 RX ADMIN — HYDROMORPHONE HYDROCHLORIDE 4 MG: 2 INJECTION, SOLUTION INTRAMUSCULAR; INTRAVENOUS; SUBCUTANEOUS at 23:14

## 2022-01-01 RX ADMIN — HYDROMORPHONE HYDROCHLORIDE 4 MG: 2 INJECTION, SOLUTION INTRAMUSCULAR; INTRAVENOUS; SUBCUTANEOUS at 18:48

## 2022-01-01 RX ADMIN — HYDROMORPHONE HYDROCHLORIDE 4 MG: 2 INJECTION, SOLUTION INTRAMUSCULAR; INTRAVENOUS; SUBCUTANEOUS at 09:55

## 2022-01-03 NOTE — TELEPHONE ENCOUNTER
Lomotil sent to pharmacy on file. Please advise patient to alternate with Imodium. Please advise her to contact office if no improvement, or if develops fever, chills, or worsening abdominal pain/diarrhea.

## 2022-01-03 NOTE — TELEPHONE ENCOUNTER
Returned patient call, [phone]. ID verified X 2. Per patient, thought she had GI virus, started with diarrhea on Thursday, has been pushing fluids and taking Imodium Q 2 hr since. Reports today has cramping/gas, passed some oily/fatty stool and has noticed bright red blood streaks. Has had night sweats/hot flashes, but denies fever, vomit, other issues. Reviewed PO fluids, encouraged BRAT diet advancing as tolerated. Instructed in pericare with gentle cleansing, pat/air dry, apply barrier cream as needed. States if Provider orders prescription medications, please use [facility] [address]. Update to Provider.

## 2022-01-03 NOTE — PROGRESS NOTES
Palliative Medicine Office Visit  Palliative Medicine Nurse Check In  [phone]    Patient Name: Simón Ivy  YOB: 1960      Date of Office Visit: 1/3/2022    Patient states: \"  \"    From Check In Sheet (scanned in Media):  Has a medical provider talked with you about cardiopulmonary resuscitation (CPR)? [] Yes   [] No   [] Unable to obtain    Nurse reminder to complete or update ACP FlowSheet:    Is ACP on the Problem List?    [x] Yes    [] No  IF ACP Document is ON FILE; Nurse to place ACP on Problem List     Is there an ACP Note in Chart Review/Note? [] Yes    [x] No   If NO: ALERT PROVIDER       Primary Decision Maker (Active): Víctor Montoya - [phone]  Advance Care Planning 1/3/2022   Patient's Healthcare Decision Maker is: Legal Next of Kin   Confirm Advance Directive -   Patient Would Like to Complete Advance Directive -   Does the patient have other document types -       Is there anything that we should know about you as a person in order to provide you the best care possible? Have you been to the ER, urgent care clinic since your last visit? [] Yes   [x] No   [] Unable to obtain    Have you been hospitalized since your last visit? [] Yes   [x] No   [] Unable to obtain    Have you seen or consulted any other health care providers outside of the [address] [place] [address] since your last visit?    [] Yes   [x] No   [] Unable to obtain    Functional status (describe):         Last BM:      accessed (date): 1/3/2022    Bottle review (for opioid pain medication):  Medication 1:   Date filled:   Directions:   # filled:   # left:   # pills taking per day:  Last dose taken:    Medication 2:   Date filled:   Directions:   # filled:   # left:   # pills taking per day:  Last dose taken:    Medication 3:   Date filled:   Directions:   # filled:   # left:   # pills taking per day:  Last dose taken:    Medication 4:   Date filled:   Directions:   # filled:   # left:   # pills taking per day:  Last dose taken:

## 2022-01-03 NOTE — TELEPHONE ENCOUNTER
Call to patient, [phone], ID verified X 2. Updated patient that prescription had been sent in to [facility] pharmacy requested. Instructions provided in how to administer, understanding verbalized and will call if fails to improve or worsens. Update to Provider.

## 2022-01-03 NOTE — TELEPHONE ENCOUNTER
Patient called and would like a call back regarding diarrhea, every 2 hours since Thursday. No other symptoms. Slight blood. Please follow up with the patient.

## 2022-01-03 NOTE — PATIENT INSTRUCTIONS
Deazach Farr ,    It was a pleasure seeing you today via televisit. We will see you again in 4 weeks for a televisit. If labs or imaging tests have been ordered for you today, please call the office  at [phone] 48 hours after completion to obtain the results. Your described symptoms were: Fatigue: 10 Drowsiness: 0   Depression: 0 Pain: 0   Anxiety: 8 Nausea: 0   Anorexia: 7 Dyspnea: 6   Best Well-Bein Constipation: No   Other Problem (Comment): 0       This is the plan we talked about:    1. Diarrhea   -You've had explosive diarrhea for the past 4 nights which has interrupted your sleep  -This is likely due, at least in part, to abruptly stopping morphine  -You've had less diarrhea today  -Dr. Bernardo Hayes office called in Lomotil for you today  -You've been very mindful to drink a lot of fluids so you don't get dehydrated. 2. Pain  -You stopped taking your morphine about 5 days ago as you've been having no pain and wanted to see how you'd do without it  -You're abdominal pain started creeping back up so you restarted your morphine yesterday  -It's important to you to have your pain controlled with the fewest side effects possible (mental fuzziness, fatigue)  -You're scheduled for repeat CT scans on   -We discussed having you continue morphine with oxycodone as needed for now  -We will meet again in 2 weeks, then will discuss how you've been pain-wise, and proceed with a taper of your pain medicines if you wish then to give this another try  -Continue extended-release morphine 15-mg every 12 hours  -Continue oxycodone 5-mg: take 1 to 2 tabs every 4 hours as needed  -Continue tylenol 500-mg: take 2 tabs every 6 hours as needed    2.  Diarrhea   -You've had explosive diarrhea for the past 4 nights which has interrupted your sleep  -This is likely due, at least in part, to abruptly stopping morphine  -You've had less diarrhea today  -Dr. Bernardo Hayes office called in LayerGloss for you today  -You've been very mindful to drink a lot of fluids so you don't get dehydrated. 3. Fatigue/poor sleep  -You continue to feel fatigued most of the time  -You've tried Ambien in the past and it was ineffective  -Dr. Delvin Sales checked your labs last month: your thyroid function is normal and you are mildly anemic (hemoglobin 10)  -Start Seroquel 25-mg: take 1/2 tab at bedtime as needed  -I recommend you hold Xanax at bedtime while taking Seroquel    4. Anxiety and depression  -You're feeling less sad now that the winter holidays are over  -Continue Zoloft 50-mg milad  -Continue Xanax 1-mg twice daily as needed  -You typically take 1 to 1.5 Xanax daily  -At your initial visit we talked about the synergistic effect between benzodiazepines (Xanax) and opioids (morphine, oxycodone) which requires that we work together closely to avoid potential adverse side-effects    5. Lung cancer  -You're receiving Keytruda under the care of Dr. Delvin Sales  -Orantoine Schwarzca completed radiation therapy to your neck on 10/19 and 10/21    6. Advance Care Planning/Goals-of-Care (we talked about this at your initial visit)  -You receiving cancer-directed therapy with the goal of palliation of symptoms and prolongation of quality life  -Quality of life is more important to you than quantity of life and you make your health care decisions guided by this value  -You've completed your Advance Medical Directives, naming your friend, Darylene Frederic, who is a hospice nurse, as your primary medical decision-maker      This is what you have shared with us about Advance Care Planning:      Primary Decision Maker (Active): Primo Carter - [address] 1/3/2022   Patient's [address] is: Legal Next of [facility] Directive -   Patient Would Like to Complete Advance Directive -   Does the patient have other document types -           The Palliative Medicine Team is here to support you and your family. Sincerely,      Mahnaz Uribe MD and the Palliative Medicine Team

## 2022-01-03 NOTE — PROGRESS NOTES
Palliative Medicine Outpatient Services  [place]: [phone] ([phone])    Patient Name: Nia Higginbotham  YOB: 1960    Date of Current Visit: 01/03/22  Location of Current Visit:    [] [facility] Office  [] [facility] Office  [] [address] [facility] Office  [] Home  [x]Synchronous real-time A/V virtual visit    Date of Initial Visit: 9/27/21  Referral from: Ferny Anguiano DO  Primary Care Physician: None      SUMMARY:   Nia Higginbotham is a 63y.o. 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 Ara Espinosa for symptom management and psychosocial support. She was diagnosed in 7/2021, underwent surgical resection of left temporal mass 8/11/21. She is currently being treated with immune therapy Jonathanskyler Hummel) and has been referred to Radiation-Oncology for C4 lesion. She is receiving cancer-directed therapy with the goal of palliation of symptoms and prolongation of quality life. Quality of life (rather than quantity) is important to her and guides her health care decisions. The patients social history includes: she is  and lives alone. 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 She has an adult daughter, Sally Samuels, who has a 11year old [facility]. She also has a 24year old daughter, Tasha Braedn, who lives in [place] and plans to attend veterinary school. She has a sister in [place], [place]. She has a sister in [place], [place] who has end stage MS, and an 80year old mother in [place], [place], who is 360 lbs, and currently on hospice. The patient takes care of her mother, and her mother's home, several days each week. She has a best friend, Demetria Cervantes, who works for [employer]. Palliative Medicine is addressing the following current patient/family concerns: symptoms related to metastatic carcinoma; support in care decisions consistent with her personal goals and values.     Initial Referral Intake note reviewed   PALLIATIVE DIAGNOSES:       ICD-10-CM ICD-9-CM    1. Abdominal pain, generalized  R10.84 789.07    2. Neck pain  M54.2 723.1    3. Diarrhea, unspecified type  R19.7 787.91    4. Bone metastases (HCC)  C79.51 198.5    5. Fatigue, unspecified type  R53.83 780.79    6. Depression, unspecified depression type  F32. A 311    7. Anxiety  F41.9 300.00    8. Non-small cell lung cancer, unspecified laterality ([facility] [address]  C34.90 162.9           PLAN:   Patient Instructions     Dear Anyi Vargas ,    It was a pleasure seeing you today via televisit. We will see you again in 4 weeks for a televisit. If labs or imaging tests have been ordered for you today, please call the office  at [phone] 48 hours after completion to obtain the results. Your described symptoms were: Fatigue: 10 Drowsiness: 0   Depression: 0 Pain: 0   Anxiety: 8 Nausea: 0   Anorexia: 7 Dyspnea: 6   Best Well-Bein Constipation: No   Other Problem (Comment): 0       This is the plan we talked about:    1. Diarrhea   -You've had explosive diarrhea for the past 4 nights which has interrupted your sleep  -This is likely due, at least in part, to abruptly stopping morphine  -You've had less diarrhea today  -Dr. Narayan Maya office called in Lomotil for you today  -You've been very mindful to drink a lot of fluids so you don't get dehydrated.     2. Pain  -You stopped taking your morphine about 5 days ago as you've been having no pain and wanted to see how you'd do without it  -You're abdominal pain started creeping back up so you restarted your morphine yesterday  -It's important to you to have your pain controlled with the fewest side effects possible (mental fuzziness, fatigue)  -You're scheduled for repeat CT scans on   -We discussed having you continue morphine with oxycodone as needed for now  -We will meet again in 2 weeks, then will discuss how you've been pain-wise, and proceed with a taper of your pain medicines if you wish then to give this another try  -Continue extended-release morphine 15-mg every 12 hours  -Continue oxycodone 5-mg: take 1 to 2 tabs every 4 hours as needed  -Continue tylenol 500-mg: take 2 tabs every 6 hours as needed    2. Diarrhea   -You've had explosive diarrhea for the past 4 nights which has interrupted your sleep  -This is likely due, at least in part, to abruptly stopping morphine  -You've had less diarrhea today  -Dr. Mathew Estes office called in Lomotil for you today  -You've been very mindful to drink a lot of fluids so you don't get dehydrated. 3. Fatigue/poor sleep  -You continue to feel fatigued most of the time  -You've tried Ambien in the past and it was ineffective  -Dr. Delvin Sales checked your labs last month: your thyroid function is normal and you are mildly anemic (hemoglobin 10)  -Start Seroquel 25-mg: take 1/2 tab at bedtime as needed  -I recommend you hold Xanax at bedtime while taking Seroquel    4. Anxiety and depression  -You're feeling less sad now that the winter holidays are over  -Continue Zoloft 50-mg milad  -Continue Xanax 1-mg twice daily as needed  -You typically take 1 to 1.5 Xanax daily  -At your initial visit we talked about the synergistic effect between benzodiazepines (Xanax) and opioids (morphine, oxycodone) which requires that we work together closely to avoid potential adverse side-effects    5. Lung cancer  -You're receiving Keytruda under the care of Dr. Delvin Sales  -Ciro San completed radiation therapy to your neck on 10/19 and 10/21    6.  Advance Care Planning/Goals-of-Care (we talked about this at your initial visit)  -You receiving cancer-directed therapy with the goal of palliation of symptoms and prolongation of quality life  -Quality of life is more important to you than quantity of life and you make your health care decisions guided by this value  -You've completed your Advance Medical Directives, naming your friend, Darylene Frederic, who is a hospice nurse, as your primary medical decision-maker      This is what you have shared with us about Advance Care Planning:      Primary Decision Maker (Active): [facility] - [phone]  Advance Care Planning 1/3/2022   Patient's Healthcare Decision Maker is: Legal Next of Kin   Confirm Advance Directive -   Patient Would Like to Complete Advance Directive -   Does the patient have other document types -           The Palliative Medicine Team is here to support you and your family.        Sincerely,      Socorro Pérez MD and the Palliative Medicine Team           GOALS OF CARE / TREATMENT PREFERENCES:   [====Goals of Care====]  GOALS OF CARE:  Patient / health care proxy stated goals: See Patient Instructions / Summary    TREATMENT PREFERENCES:   Code Status:  [x] Attempt Resuscitation       [] Do Not Attempt Resuscitation    Advance Care Planning:  [x] The Pall Med Interdisciplinary Team has updated the ACP Navigator with Decision Maker and Patient Capacity      Primary Decision Maker (Active): [facility] - [phone]  Advance Care Planning 1/3/2022   Patient's Healthcare Decision Maker is: Legal Next of Kin   Confirm Advance Directive -   Patient Would Like to Complete Advance Directive -   Does the patient have other document types -       Other:  (If patient appropriate for POST, consider using PALLPOST smart phrase here)    The palliative care team has discussed with patient / health care proxy about goals of care / treatment preferences for patient.  [====Goals of Care====]     PRESCRIPTIONS GIVEN:     Medications Ordered Today   Medications    QUEtiapine (SEROquel) 25 mg tablet     Sig: Take 12.5-mg at bedtime as needed for sleep     Dispense:  30 Tablet     Refill:  0           FOLLOW UP:     Future Appointments   Date Time Provider Lakeshia Valencia   1/12/2022 10:00 AM BSI CT 1 BSIRCT IMAGINNSBR   1/27/2022 11:00 AM B3 JREZY MED [facility]   1/27/2022 11:15 AM Tatiana Meyer,  [address]  BS AMB   3/10/2022 11:00 AM C1 JERZY MED [facility] H   4/21/2022 11:00 AM B3 JERZY MED TX [facility]           PHYSICIANS INVOLVED IN CARE:   Patient Care Team:  None as PCP - General  Alessandra Guadarrama DO (Hematology and Oncology)  Geremias Cardoza RN as Benefits Care Manager  Gunjan Lang MD as Physician (Palliative Medicine)  Gunjan Lang MD as Physician (Palliative Medicine)       HISTORY:   Reviewed patient-completed ESAS and advance care planning form. Reviewed patient record in prescription monitoring program.    CHIEF COMPLAINT:   Chief Complaint   Patient presents with    Diarrhea       HPI/SUBJECTIVE:    The patient is: [x] Verbal / [] Nonverbal      See Plan/Patient Instructions for interval history    From IV 9/27/2021:  She had a headache and pain in her right chest when she returned from a trip to [facility] last July. She had a chest X-ray which showed the mass in her lung, then had a CT scan which showed the brain mass. She had gamma knife (Dr. Leslie Parsons), this helped with the headache. She started Bobie Pander about 2 weeks ago. Since then, she's had more pain in her right chest and in her abdomen \"where the cancer is. \"  She's also been having neck pain and a burning pain from her back, under her right shoulder blade. She's been taking 1 to 2 oxycodone ~ 4 times a day which helps but wears off after 4 to 4.5 hours. She's also been taking tylenol. .  She's going to start radiation therapy tomorrow for the lesion in her neck. She feels very fatigued this morning. She takes care of her grandson, Isauro Alfonso, 3 days a week while her daughter works. He spends the night with her, too. She also cares for her 80-year old mother, who lives in her own home with hospice support. She goes to her mother's house every morning to help her bath, does the laundry. She hasn't been sleeping well since her cancer diagnosis.   She takes Xanax at bedtime which helps her go to sleep, then takes melatonin when she wakes up in the middle of the night. She usually goes back to sleep after taking melatonin. She tried Ambien in the past and it made her feel too sedated the following day, interfered with her work. Her appetite is good. She's had trouble with her bowels all of her life. She goes between constipation and diarrhea. She's never seen a doctor for this. She usually takes Metamucil when she gets constipated. She hasn't noticed any difference in her bowels since she started taking oxycodone. She's been taking Xanax for 15 years for \"anger\" related to family issues. She's used it from time-to-time in the past, more since her cancer diagnosis. She usually takes 1/2 tab during the day if she needs it. She's never taken any other medications for anxiety or depression. She wouldn't have had gamma knife but she needed to get her affairs in order. She works as a respiratory therapist in the ICU at [employer] and she's seen how people can get. She doesn't want to be like that. She now has a will and made plans for her cremation. She had a AMD; her friend, Bryan Bonilla, is a hospice RN and is her decision-maker.     Clinical Pain Assessment (nonverbal scale for nonverbal patients):   [++++ Clinical Pain Assessment++++]  [++++Pain Severity++++]: Pain: 0  [++++Pain Character++++]: burning under shoulder blade; sharp, knife-like other areas  [++++Pain Duration++++]: since August 2021  [++++Pain Effect++++]:  [++++Pain Factors++++]: oxycodone helps; wearing abdominal binder helps  [++++Pain Frequency++++]: constant with varying intensity  [++++Pain Location++++]: neck; under right shoulder blade; upper quadrant abdomen; right lower flank  [++++ Clinical Pain Assessment++++]       FUNCTIONAL ASSESSMENT:     Palliative Performance Scale (PPS):  PPS: 70       PSYCHOSOCIAL/SPIRITUAL SCREENING:     Any spiritual / Bahai concerns:  [] Yes /  [x] No    Caregiver Burnout:  [] Yes /  [] No /  [x] No Caregiver Present      Anticipatory grief assessment:   [x] Normal  / [] Maladaptive       ESAS Anxiety: Anxiety: 8    ESAS Depression: Depression: 0       REVIEW OF SYSTEMS:     The following systems were [x] reviewed / [] unable to be reviewed  Systems: constitutional, ears/nose/mouth/throat, respiratory, gastrointestinal, genitourinary, musculoskeletal, integumentary, neurologic, psychiatric, endocrine. Positive findings noted below. Modified ESAS Completed by: provider   Fatigue: 10 Drowsiness: 0   Depression: 0 Pain: 0   Anxiety: 8 Nausea: 0   Anorexia: 7 Dyspnea: 6   Best Well-Bein Constipation: No   Other Problem (Comment): 0          PHYSICAL EXAM:     Wt Readings from Last 3 Encounters:   21 144 lb 9.6 oz (65.6 kg)   21 144 lb 9.6 oz (65.6 kg)   10/28/21 154 lb 6.4 oz (70 kg)     There were no vitals taken for this visit.   Last bowel movement: See Nursing Note    Constitutional    [x] Appears well-developed and well-nourished in no apparent distress    [] Abnormal:  Mental status  [x] Alert and awake  [x] Oriented to person/place/time  [x] Able to follow commands  [] Abnormal:   Eyes  [x] EOM normal   [x] Sclera normal   [x] No visible ocular discharge  [] Abnormal:   HENT  [x] Normocephalic, atraumatic  [x] Mouth/Throat: Moist mucous membranes   [x] External Ears normal  [] Abnormal:  Neck  [x] No visualized mass  [] Abnormal:  Pulmonary/Chest   [x] Respiratory effort normal  [x] No visualized signs of difficulty breathing or respiratory distress  [] Abnormal:  Musculoskeletal  [x] Normal gait with no signs of ataxia  [x] Normal range of motion of neck  [] Abnormal:  Neurological:   [x] No facial asymmetry (Cranial nerve 7 motor function)  [x] No gaze palsy  [] Abnormal:   Skin  [x] No significant exanthematous lesions or discoloration noted on facial skin  [] Abnormal:                                  Psychiatric  [x] Normal affect  [x] No hallucinations  [] Abnormal:    Other pertinent observable physical exam findings:    Due to this being a TeleHealth evaluation, many elements of the physical examination are unable to be assessed. HISTORY:     Past Medical History:   Diagnosis Date    Cancer [facility]) 2021    LUNGS AND BRAIN AND LYMPH NODES    Hypercholesterolemia     Metastatic carcinoma ([facility] [address] 8/19/2021      Past Surgical History:   Procedure Laterality Date    HX BREAST AUGMENTATION      HX HERNIA REPAIR      IMPLANT BREAST SILICONE/EQ Bilateral 8959    sub-pectoral      Family History   Problem Relation Age of Onset    Depression Mother     Obesity Mother     Heart Disease Mother     Mult Sclerosis Sister         END STAGE    Cancer Brother         TONSIL, WITH METS TO LIVER AND BONES    Heart Disease Maternal Grandfather     Cancer Paternal Grandmother     Anesth Problems Neg Hx       History reviewed, no pertinent family history. Social History     Tobacco Use    Smoking status: Current Every Day Smoker     Packs/day: 0.75     Years: 40.00     Pack years: 30.00    Smokeless tobacco: Never Used   Substance Use Topics    Alcohol use: Yes     Alcohol/week: 3.0 standard drinks     Types: 3 Cans of beer per week     Comment: socially     No Known Allergies   Current Outpatient Medications   Medication Sig    QUEtiapine (SEROquel) 25 mg tablet Take 12.5-mg at bedtime as needed for sleep    ALPRAZolam (XANAX) 1 mg tablet Take 1 Tablet by mouth two (2) times daily as needed for Anxiety. Max Daily Amount: 2 mg. TAKE 1 TABLET BY MOUTH EVERY 4 HOURS AS NEEDED FOR ANXIETY    morphine CR (MS CONTIN) 15 mg CR tablet Take 1 Tablet by mouth every twelve (12) hours for 30 days. Max Daily Amount: 30 mg.    oxyCODONE IR (ROXICODONE) 5 mg immediate release tablet Take 1 Tablet by mouth every four (4) hours as needed for Pain for up to 20 days.  Max Daily Amount: 30 mg.    levETIRAcetam (KEPPRA) 500 mg tablet TAKE ONE TABLET BY MOUTH TWO TIMES A DAY    albuterol (PROVENTIL HFA, VENTOLIN HFA, PROAIR HFA) 90 mcg/actuation inhaler Take 2 Puffs by inhalation every four (4) hours as needed for Wheezing.  ferrous sulfate (IRON PO) Take  by mouth.  MULTIVITAMIN PO Take  by mouth.  sertraline (ZOLOFT) 50 mg tablet Take 1 Tablet by mouth daily.  simethicone (GAS-X) 125 mg capsule Take 125 mg by mouth daily as needed for Flatulence.  acetaminophen (TYLENOL) 325 mg tablet Take 2 Tablets by mouth every six (6) hours as needed for Pain.  tiotropium (Spiriva with HandiHaler) 18 mcg inhalation capsule Take 1 Capsule by inhalation daily.  medroxyPROGESTERone (Provera) 5 mg tablet Take 5 mg by mouth daily.  estradioL (Estrace) 0.5 mg tablet Take 0.5 mg by mouth daily.  rosuvastatin (CRESTOR) 10 mg tablet TAKE 1 TABLET BY MOUTH EVERY DAY    diphenoxylate-atropine (LomotiL) 2.5-0.025 mg per tablet Take 2 Tablets by mouth two (2) times daily as needed for Diarrhea. Max Daily Amount: 4 Tablets. (Patient not taking: Reported on 1/3/2022)    senna (Senna) 8.6 mg tablet Take 2 Tablets by mouth daily. (Patient not taking: Reported on 1/3/2022)    OTHER Gentle lax (Patient not taking: Reported on 1/3/2022)    naloxone (Narcan) 4 mg/actuation nasal spray Use 1 spray intranasally, then discard. Repeat with new spray every 2 min as needed for opioid overdose symptoms, alternating nostrils.  polyethylene glycol (Miralax) 17 gram/dose powder Take 17 g by mouth as needed for Constipation. (Patient not taking: Reported on 1/3/2022)    dexAMETHasone (DECADRON) 4 mg tablet Take 1 tab PO every 12 hours tonight then 1/2 a tab PO every 12 hours x 2 days then 1/2 a tab daily x 2 days then stop. (Patient not taking: Reported on 9/27/2021)    varenicline (Chantix) 1 mg tablet Take 1 mg by mouth two (2) times daily (after meals). (Patient not taking: Reported on 9/27/2021)     No current facility-administered medications for this visit. LAB DATA REVIEWED:     Lab Results   Component Value Date/Time    WBC 15.5 (H) 12/16/2021 10:09 AM    HGB 10.2 (L) 12/16/2021 10:09 AM    PLATELET 465 (H) 02/37/9489 10:09 AM     Lab Results   Component Value Date/Time    Sodium 137 12/16/2021 10:09 AM    Potassium 4.7 12/16/2021 10:09 AM    Chloride 107 12/16/2021 10:09 AM    CO2 25 12/16/2021 10:09 AM    BUN 9 12/16/2021 10:09 AM    Creatinine 0.70 12/16/2021 10:09 AM    Calcium 9.9 12/16/2021 10:09 AM      Lab Results   Component Value Date/Time    Alk. phosphatase 148 (H) 12/16/2021 10:09 AM    Protein, total 7.5 12/16/2021 10:09 AM    Albumin 3.5 12/16/2021 10:09 AM    Globulin 4.0 12/16/2021 10:09 AM     No results found for: INR, PTMR, PTP, PT1, PT2, APTT, INREXT, INREXT   Lab Results   Component Value Date/Time    Iron 31 (L) 10/28/2021 11:08 AM    TIBC 205 (L) 10/28/2021 11:08 AM    Iron % saturation 15 (L) 10/28/2021 11:08 AM    Ferritin 1,539 (H) 10/28/2021 11:08 AM        CT chest 7/23/21:  Spiculated right upper lobe mass with positive right hilar and mediastinal  adenopathy. Probable hepatic and possible left adrenal metastases. Recommend  PET/CT. MRI brain 8/3/21:  1. 2.5 cm left temporal lobe mass with subacute hemorrhage. Considering  patient's history this is most likely metastasis. 2. Mild nonspecific white matter T2 hyperintensity may be related to mild  chronic small vessel ischemic change. PET-CT 5/43/39:  Hypermetabolic mass lesion right upper lobe with hypermetabolic  mediastinal and right hilar lymphadenopathy. Hypermetabolic right adrenal mass  is concerning for metastatic disease.     MRI cervical spine 9/12/21:  1. C4 vertebral tumor/metastasis extending into the right foramen, without  spinal stenosis. 2. Multilevel cervical degenerative disc disease. 3. No fracture or subluxation.      CONTROLLED SUBSTANCES SAFETY ASSESSMENT (IF ON CONTROLLED SUBSTANCES):     Reviewed opioid safety handout:  [x] Yes   [] No  24 hour opioid dose >150mg morphine equivalent/day:  [] Yes   [x] No  Benzodiazepines:  [x] Yes   [] No  Sleep apnea:  [] Yes   [x] No  Urine Toxicology Testing within last 6 months:  [] Yes   [x] No  History of or new aberrant medication taking behaviors:  [] Yes   [] No  Has Narcan been prescribed [] Yes   [x] No          Total time:   Counseling / coordination time:   > 50% counseling / coordination?:     Consent:  He and/or health care decision maker is aware that that he may receive a bill for this telehealth service, depending on his insurance coverage, and has provided verbal consent to proceed: Yes    CPT Codes 37527-42104 for Established Patients may apply to this Telehealth Visit    Pursuant to the emergency declaration under the [address] and the [place] [address] waiver authority and the [employer] Act, this Virtual  Visit was conducted, with patient's consent, to reduce the patient's risk of exposure to COVID-19 and provide continuity of care for an established patient. Services were provided through a video synchronous discussion virtually to substitute for in-person clinic visit.

## 2022-01-05 NOTE — TELEPHONE ENCOUNTER
Patient returning phone call. Advised PSR calling to schedule 4 week VV. Appt scheduled for 1/31/2022 at 11:30am with Dr. Celina Ferguson. PSR notified.

## 2022-01-10 NOTE — TELEPHONE ENCOUNTER
Patient called to say that she is still having diarrhea for now 9 days. It hurst to sit or to stand or comfort in lying down. She also want to know if she should be drinking the ensure drink with probitic in it. She already drink the protein ones. She did describe the diarrhea being very mucous.        # [phone]

## 2022-01-11 NOTE — TELEPHONE ENCOUNTER
Returned call to patient, ID verified X 2. [phone]. Left VM on self identifying phone line requesting cb to RN. Contact info/hours supplied.

## 2022-01-12 NOTE — TELEPHONE ENCOUNTER
Follow up call to patient, Left VM to contact RN. [phone]. Scans scheduled for today were cancelled by patient. Patient's parent has contacted office regarding diarrhea, but parent is not listed on PHI. To Provider.

## 2022-01-12 NOTE — TELEPHONE ENCOUNTER
Patients mother called and stated that she would like for the nurse to know her daughter has had diarhea for 2 weeks now. Please advise.     CB# [phone]

## 2022-01-12 NOTE — TELEPHONE ENCOUNTER
Call to patient, [phone]. ID verified X 2. Provider message relayed regarding C-diff collection, steroids, scan reschedule. Requests orders be faxed to [address] [place] [address] and she will notify daughter to  medication at [address] [facility] and specimen cup at [facility].  Agreed to reschedule scans, verified that she has the phone number to call. States that she is staying hydrated in spite of diarrhea, has been taking Lomotil AM/PM and Imodium in between doses. States pushing fluids, drinking PediaLyte. Understands office had contacted [facility] to evaluate and hydrate patient. Patient states is voiding and has refused their services. Understands that mother/daughter have contacted office for assistance with patient and that no one is listed on her PHI, unable to discuss care with them. Update to Provider.

## 2022-01-12 NOTE — TELEPHONE ENCOUNTER
Discussed with Dr. Rayne Griffiths - will order Cdiff test and Medrol Dose Pack. Please let patient know. Fax Cdiff lab slip to patient's [employer] of choice. She also needs to reschedule CTs when able.

## 2022-01-13 NOTE — TELEPHONE ENCOUNTER
Follow up call to patient, ID verified X 2. [phone]. Patient reports received steroids yesterday and started last night. Diarrhea continues. Has submitted stool specimen to [facility] this day. Reports having up to 12 BM's per 24 hour period. States will be either explosive diarrhea or mucus with gas and cramping. Denies blood in stool. Diet assessed - had instructed 1/3/22 in [address] [place] [address] and how to advance as tolerated. Per patient she has had today a bologna sandwich, boiled eggs, cheese toast.  Again requested patient start BRAT diet, diet reviewed. States having seven 12 oz brady per day, 2 PediaLytes and Gatorade. Again states does not feel as if dehydrated as continues to void clear. Encouraged to reschedule CT scans, plans to wait until 1/17/22 to call. Update to Provider/Dietician.

## 2022-01-14 NOTE — TELEPHONE ENCOUNTER
[facility] [place] [address], [address] [place] [address] [address]: [phone]  F: [phone]    Medical Nutrition Therapy  Nutrition Referral:    Referral received from [facility] KELSEA, RN regarding diarrhea. Called patient, no answer. Left a message, explaining that RD is available to address nutrition throughout the spectrum of care. Will send her Zulu message with information about managing loose stools. Contact information provided.      Chemotherapy Flowsheet 12/16/2021   Cycle C3   Date 12/16/2021   Drug / Regimen Keytruda   Notes -       Signed By: Kun Kuo, [address] [employer]

## 2022-01-17 NOTE — PROGRESS NOTES
Attempted to reach out to patient off mobile phone number listed. No answer, left a voicemail to give the office a call back!   Dari Caro

## 2022-01-17 NOTE — TELEPHONE ENCOUNTER
Patient said she was returning a calll to RN - asked did she know what it was regarding and she said she has been having difficulty with her bowels for weeks and thought maybe this was the reason.

## 2022-01-17 NOTE — PROGRESS NOTES
CTs overall look good/ possible immunotherapy reaction effect  Hold immunotherapy/ steroids/ monitor diarrhea

## 2022-01-17 NOTE — TELEPHONE ENCOUNTER
Returned call to patient, ID verified X 2. [phone]. Patient updated RN that she has completed steroids and Lomotil. Diarrhea has slowed from 12 stools per day to 8. Continues to push fluids, not really eating other than the BRAT diet and reports being sick of that. Plans to try some chicken noodle soup today. Had CT scans today. Declined refill on medications, wanted to update NP that she would like to stay off of immunotherapy for awhile longer, understands that Dietician phoned her and would like to speak with her, and will be updated regarding scan results after Provider reviews. Update to Provider.

## 2022-01-17 NOTE — TELEPHONE ENCOUNTER
Please let patient know that CTs show likely inflammatory process in sigmoid colon/rectum, possibly related to drug reaction from Pembrolizumab. High dose Prednisone (60 mg PO daily) sent to [facility],. Please advise her to start today. Please advise her to let us know if there is no improvement in diarrhea in the next 2-3 days. The rest of the CT report is still awaiting review by Dr. Ancelmo Bryant.

## 2022-01-17 NOTE — TELEPHONE ENCOUNTER
Call to patient, [phone], ID verified X 2. Updated patient with Provider message, understanding verbalized. States very tired post scans today and is not able to get to [facility] pharmacy for meds. Would like prescription transferred to [facility] [place] and [address] [place] [address]. Update to Provider.

## 2022-01-18 NOTE — TELEPHONE ENCOUNTER
[facility] [place] at [facility]   [address], [address]  [place]: [phone]  F: [phone]    Medical Nutrition Therapy  Nutrition Referral:    Called patient and spoke with her. She reports that so far today she has had toast and 2 bananas. She reports 4 or 5 bowel movements. Reports sometimes bowel movements are just gas and pressure to use the bathroom, but nothing comes out. She states she is finished with the lomotil. We discussed foods she can and cannot have. Discussed foods to help thicken the stool. Encouraged her to open mychart message with additional information on diarrhea management and specifics on food. Encouraged her to avoid greasy and fried foods as she indicated she wants a cheeseburger. She is hopeful the steroids will help. Encouraged her to reach out for any additional questions or concerns.      Signed By: Helen Omer, [address] [employer]

## 2022-01-19 NOTE — PROGRESS NOTES
JACINTO Verified. Called pt on mobile phone number listed. Patient was made aware of most recent C diff testing being negative. Patient states she started taking her medication prescribed for her diarrhea yesterday. Patient stated she will update us if it does not help her. Patient had a few questions in regards to CT scan! MA made her aware she may discuss this at her next visit on 1/27/22. She was very appreciative for checking in!   Quyen Mcintyre

## 2022-01-25 NOTE — TELEPHONE ENCOUNTER
Patient is calling to get a refill on her morphine 15 mg,  Her Xanax and her oxycodone. Wants to  from [facility] pharmacy on 1/27/2022.

## 2022-01-25 NOTE — TELEPHONE ENCOUNTER
Triage for Controlled Substance Refill Request    Pain Diagnosis: _Anxiety (F41.9) Abdominal pain, generalized (R10.84); Metastatic carcinoma (HCC) (C79.9); Neck pain (M54.2    Last Outpatient Visit: _1/3/2022    Next Outpatient Visit: _1/31/2022    Reason for refill needed outside of office visit? Appointment not scheduled prior to need for scheduled refill      Pharmacy: _[address]    Medication:morphine CR (MS CONTIN) 15 mg CR tablet      Dose and directions: Take 1 Tablet by mouth every twelve (12) hours for 30 days  Number dispensed:60  Date filled ( or Pharmacy):12/9/2022  # left:    Medication:oxyCODONE IR (ROXICODONE) 5 mg immediate release tablet       Dose and directions: Take 1 Tablet by mouth every four (4) hours as needed for Pain for up to 20 days. Number dispensed:120  Date filled ( or Pharmacy):12/29/2021  #left:    Medcation:ALPRAZolam (XANAX) 1 mg tablet       Dose and directions: Take 1 Tablet by mouth two (2) times daily as needed for Anxiety.   Number dispensed:60  Date filled: 12/29/2021  Left:     reviewed: _yes    Date of Urine Drug Screen:  _    Opioid Safety Handout given:  _yes    Appropriate for refill:  _yes    Action:  _ Medication pending

## 2022-01-27 NOTE — PROGRESS NOTES
Bowen Yu is a 63 y.o. female  Chief Complaint   Patient presents with    Chemotherapy     Metastatic Cancer likely Lung Primary     1. Have you been to the ER, urgent care clinic since your last visit? Hospitalized since your last visit? No.    2. Have you seen or consulted any other health care providers outside of the [address] [place] [address] since your last visit? Include any pap smears or colon screening. No.    Patient states she is feeling pain in her Adrenal glands, lower back pain.

## 2022-01-27 NOTE — PROGRESS NOTES
[facility] [place] [facility] [place] [address] [address], [address], [place]: [phone]  F: [phone]    Reason for Visit:   Shayne Blanco is a 63 y.o. female seen today in office for follow up of Metastatic Cancer likely Lung Primary    Treatment History:   · CT Chest 7/23/21: Spiculated right upper lobe mass with positive right hilar and mediastinal adenopathy. Probable hepatic and possible left adrenal metastases. Recommend PET/CT  · CT Chest 7/30/21: Right upper lobe pulmonary nodule with mediastinal and right hilar lymphadenopathy, highly suspicious for primary pulmonary neoplasm. 2 mm right middle lobe pulmonary nodule. Suspicious masses in the right adrenal gland and liver  · Brain MRI 8/3/21: 2.5 cm left temporal lobe mass with subacute hemorrhage. Considering patient's history this is most likely metastasis. Mild nonspecific white matter T2 hyperintensity may be related to mild chronic small vessel ischemic change  · CT Head 8/11/21: 2.5 cm left temporal lobe enhancing mass  · 8/11/21 PROCEDURE PERFORMED:  BrainLAB-guided stereotactic craniotomy resection of left temporal hemorrhagic metastatic brain tumor, use of operating microscope and stereotaxy  · CT Head 8/12/21: Recent left temporoparietal craniotomy with tumor resection. No operative complications  · PET 1/70/39: Hypermetabolic mass lesion right upper lobe with hypermetabolic mediastinal and right hilar lymphadenopathy. Hypermetabolic right adrenal mass is concerning for metastatic disease  · Mammogram 8/27/21: No mammographic evidence of malignancy  · Gamma Knife with Dr. Stepan Tuttle 9/9/21  · MRI C Spine 9/12/21: C4 vertebral tumor/metastasis extending into the right foramen, without spinal stenosis. Multilevel cervical degenerative disc disease.  No fracture or subluxation  · Pembro 9/16/21 - Current  · XRT to C4 done 10/21/21  · MRI Brain 12/1/21: Postsurgical changes related to left posterior lateral temporal lobe mass resection, with minimal curvilinear enhancement but no definite residual or recurrent tumor. No new metastases  · MRI Cervical Spine 12/1/21: Decreased marrow replacement within the C4 vertebral body however there is persistent enhancing tissue within the right foramen and involvement of the right C4-5 facet compatible with extraosseous tumor spread. There is no new epidural component or new cord compression. No new metastatic disease to the cervical spine    STAGE: 4 with brain mets    History of Present Illness:   Nia Higginbotham is a 63 y.o. 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 CTs with ? Progression vs immunotherapy reaction. Had steroids due to possible immunotherapy reaction. Pt adjusted steroid dose due to side effects. Pt is feeling better overall. Seeing palliative care for meds. Diarrhea better. Breathing ok. Hold immunotherapy today. No fevers/ chills/ chest pain/ SOB/ nausea/ vomiting/        Last visit:  She had a follow up Brain MRI on 12/1/21 that showed postsurgical changes with no definite evidence of redisdual or recurrent tumor and no new metastases. She is here today for Cycle 3 of Keytruda. She reports that she feels well overall today. She is tolerating Keytruda well overall with fatigue, occasional chills, diarrhea and constipation. She states that she always feels cold. Her appetite is low overall but she is eating because she knows she needs to. She is drinking shakes. She continues to have mid back pain but is taking Oxycodone and Morphine per Palliative Care. She has occasional headaches but \"more like eye strain. \" She continues to smoke but has cut back significantly and is using Nicotine gum. She denies fever, mouth sores, cough, SOB, CP, nausea, and, vomiting. CBC, CMP and TSH are still pending today. She wants treatment today. She is here alone today.      FamHx:  Brother cancer H+N cancer    Past Medical History:   Diagnosis Date    Cancer [facility]) 2021    LUNGS AND BRAIN AND LYMPH NODES    Hypercholesterolemia     Metastatic carcinoma ([facility] [address] 8/19/2021      Past Surgical History:   Procedure Laterality Date    HX BREAST AUGMENTATION      HX HERNIA REPAIR      IMPLANT BREAST SILICONE/EQ Bilateral 3383    sub-pectoral      Social History     Tobacco Use    Smoking status: Current Every Day Smoker     Packs/day: 0.75     Years: 40.00     Pack years: 30.00    Smokeless tobacco: Never Used   Substance Use Topics    Alcohol use: Yes     Alcohol/week: 3.0 standard drinks     Types: 3 Cans of beer per week     Comment: socially      Family History   Problem Relation Age of Onset    Depression Mother     Obesity Mother     Heart Disease Mother     Mult Sclerosis Sister         END STAGE    Cancer Brother         TONSIL, WITH METS TO LIVER AND BONES    Heart Disease Maternal Grandfather     Cancer Paternal Grandmother     Anesth Problems Neg Hx      Current Outpatient Medications   Medication Sig    ALPRAZolam (XANAX) 1 mg tablet Take 1 Tablet by mouth two (2) times daily as needed for Anxiety. Max Daily Amount: 2 mg. TAKE 1 TABLET BY MOUTH EVERY 4 HOURS AS NEEDED FOR ANXIETY    morphine CR (MS CONTIN) 15 mg CR tablet Take 1 Tablet by mouth every twelve (12) hours for 30 days. Max Daily Amount: 30 mg.    oxyCODONE IR (ROXICODONE) 5 mg immediate release tablet Take 1 Tablet by mouth every four (4) hours as needed for Pain for up to 20 days. Max Daily Amount: 30 mg.  predniSONE (DELTASONE) 20 mg tablet Take 60 mg by mouth daily (with breakfast).  diphenoxylate-atropine (LomotiL) 2.5-0.025 mg per tablet Take 2 Tablets by mouth two (2) times daily as needed for Diarrhea. Max Daily Amount: 4 Tablets.     QUEtiapine (SEROquel) 25 mg tablet Take 12.5-mg at bedtime as needed for sleep    levETIRAcetam (KEPPRA) 500 mg tablet TAKE ONE TABLET BY MOUTH TWO TIMES A DAY    albuterol (PROVENTIL HFA, VENTOLIN HFA, PROAIR HFA) 90 mcg/actuation inhaler Take 2 Puffs by inhalation every four (4) hours as needed for Wheezing.  ferrous sulfate (IRON PO) Take  by mouth.  MULTIVITAMIN PO Take  by mouth.  sertraline (ZOLOFT) 50 mg tablet Take 1 Tablet by mouth daily.  naloxone (Narcan) 4 mg/actuation nasal spray Use 1 spray intranasally, then discard. Repeat with new spray every 2 min as needed for opioid overdose symptoms, alternating nostrils.  polyethylene glycol (Miralax) 17 gram/dose powder Take 17 g by mouth as needed for Constipation.  simethicone (GAS-X) 125 mg capsule Take 125 mg by mouth daily as needed for Flatulence.  acetaminophen (TYLENOL) 325 mg tablet Take 2 Tablets by mouth every six (6) hours as needed for Pain.  tiotropium (Spiriva with HandiHaler) 18 mcg inhalation capsule Take 1 Capsule by inhalation daily.  medroxyPROGESTERone (Provera) 5 mg tablet Take 5 mg by mouth daily.  estradioL (Estrace) 0.5 mg tablet Take 0.5 mg by mouth daily.  rosuvastatin (CRESTOR) 10 mg tablet TAKE 1 TABLET BY MOUTH EVERY DAY    dexAMETHasone (DECADRON) 4 mg tablet Take 1 tab PO every 12 hours tonight then 1/2 a tab PO every 12 hours x 2 days then 1/2 a tab daily x 2 days then stop. (Patient not taking: Reported on 9/27/2021)    varenicline (Chantix) 1 mg tablet Take 1 mg by mouth two (2) times daily (after meals). (Patient not taking: Reported on 9/27/2021)     No current facility-administered medications for this visit. No Known Allergies     Review of Systems:  A complete review of systems was obtained, negative except as described above and as reported on ROS sheet scanned into system.      Physical Exam:     Visit Vitals  BP (!) 100/50 (BP 1 Location: Left upper arm, BP Patient Position: Sitting)   Pulse 86   Temp (!) 96.4 °F (35.8 °C) (Temporal)   Ht 5' 6\" (1.676 m)   Wt 145 lb 8 oz (66 kg)   SpO2 98%   BMI 23.48 kg/m²     ECOG PS:  1  General: No distress  Eyes: Anicteric sclerae  HENT: Atraumatic, wearing a mask  Neck: Supple  Respiratory: CTAB, normal respiratory effort  CV: Normal rate, regular rhythm, no murmurs, no peripheral edema  GI: Soft, nontender, nondistended, no masses  MS: Normal gait and station. Digits without clubbing or cyanosis. Skin: No rashes, ecchymoses, or petechiae. Normal temperature, turgor, and texture. Psych: Alert, oriented, appropriate affect, normal judgment/insight  Neuro: Non focal    Results:     Lab Results   Component Value Date/Time    WBC 21.7 (H) 01/27/2022 11:11 AM    HGB 10.3 (L) 01/27/2022 11:11 AM    HCT 33.6 (L) 01/27/2022 11:11 AM    PLATELET 090 (H) 23/03/7779 11:11 AM    MCV 84.2 01/27/2022 11:11 AM    ABS. NEUTROPHILS 16.3 (H) 01/27/2022 11:11 AM     Lab Results   Component Value Date/Time    Sodium 137 01/27/2022 11:11 AM    Potassium 4.8 01/27/2022 11:11 AM    Chloride 107 01/27/2022 11:11 AM    CO2 27 01/27/2022 11:11 AM    Glucose 96 01/27/2022 11:11 AM    BUN 8 01/27/2022 11:11 AM    Creatinine 0.65 01/27/2022 11:11 AM    GFR est AA >60 01/27/2022 11:11 AM    GFR est non-AA >60 01/27/2022 11:11 AM    Calcium 9.4 01/27/2022 11:11 AM    Glucose (POC) 109 08/16/2021 08:39 AM     Lab Results   Component Value Date/Time    Bilirubin, total 0.2 01/27/2022 11:11 AM    ALT (SGPT) 24 01/27/2022 11:11 AM    Alk. phosphatase 127 (H) 01/27/2022 11:11 AM    Protein, total 6.8 01/27/2022 11:11 AM    Albumin 2.6 (L) 01/27/2022 11:11 AM    Globulin 4.2 (H) 01/27/2022 11:11 AM     CT Chest 7/23/21  IMPRESSION:  Spiculated right upper lobe mass with positive right hilar and mediastinal  adenopathy. Probable hepatic and possible left adrenal metastases. Recommend  PET/CT. CT Chest 7/30/21  IMPRESSION:  Right upper lobe pulmonary nodule with mediastinal and right hilar  lymphadenopathy, highly suspicious for primary pulmonary neoplasm. 2 mm right  middle lobe pulmonary nodule. Suspicious masses in the right adrenal gland and  liver.     Brain MRI 8/3/21  IMPRESSION:  1. 2.5 cm left temporal lobe mass with subacute hemorrhage. Considering  patient's history this is most likely metastasis. 2. Mild nonspecific white matter T2 hyperintensity may be related to mild  chronic small vessel ischemic change. CT Head 8/11/21  IMPRESSION:  2.5 cm left temporal lobe enhancing mass. 8/11/21  FINAL PATHOLOGIC DIAGNOSIS:   1.  Brain, left temporal tumor, biopsy:        Metastatic poorly differentiated carcinoma   See comment   2.  Brain, left temporal tumor, excision:        Metastatic poorly differentiated carcinoma     Comment   Immunohistochemical stains reveal reactivity for CK7 and NATHANIEL-3, with   patchy/focal p40 staining. ER, HI, HER2, TTF-1, MART-1, SOX-10, CK20, and   CDX-2 stains are negative in the malignant cells.     The patient's recent lung/mediastinal lymph node biopsies are reviewed and   the current tumor shares some morphologic characteristics with the   malignant cells in the 4R lymph node cell block ([id number]), however   evaluation is limited by extensive necrosis. A subset of pulmonary   adenocarcinomas show NATHANIEL-3 expression (approximately 12%), therefore   metastatic carcinoma from a lung primary is still a possibility, however   NATHANIEL-3 expression is much more commonly seen in breast and urothelial   primaries, which should be reasonably excluded clinically. Note is made that the patient's recent lung biopsy was sent for molecular   testing which is pending, therefore additional testing is not requested on   this specimen. If needed, tumor material is available in block 2A of the   current specimen for ancillary testing. These findings were discussed with Dr. Brit Holliday via [employer]   at 2:30 p.m. on 8/13/2021. CT Head 8/12/21  IMPRESSION:  Recent left temporoparietal craniotomy with tumor resection.  No  operative complications    PET Results (most recent):  Results from Hospital Encounter encounter on 08/16/21    PET/CT TUMOR IMAGE SKULL THIGH W (INI)    Addendum 8/24/2021  2:23 PM  Addendum: There is a focus of increased tracer activity corresponding to a 5 mm  lytic lesion in the C4 vertebral body, compatible with metastatic disease. Narrative  PET/CT SCAN    PROCEDURE: Following IV injection of 9.98 mCi 18 Fluoro 2 deoxyglucose (FDG) and  a standard uptake delay, PET imaging is performed from the skull vertex to mid  thigh and axial, sagittal and coronal images were acquired. Unenhanced CT is  obtained for anatomic localization, and attenuation correction of the PET scan. Patient preprocedure blood glucose level: 109mg/dL. CORRELATIVE IMAGING STUDIES: Chest CT 7/30/2021. COMPARISON PET: None    HISTORY: The study is requested for staging metastatic poorly differentiated  carcinoma. Biopsy confirmed diagnosis. FINDINGS:    HEAD/NECK: Photopenia corresponds to the left frontal postoperative changes. CHEST: Increased tracer activity corresponds to the lesion in the right upper  lobe, maximum SUV is 7.3. Additionally, there is hypermetabolic right  paratracheal, precarinal, and right hilar lymphadenopathy. ABDOMEN/PELVIS: Hypermetabolic right adrenal mass, maximum SUV is 9.3. There is  no abnormal uptake to correspond to the small hypodense lesion in the liver. SKELETON: No foci of abnormal hypermetabolism in the axial and visualized  appendicular skeleton. Impression  Hypermetabolic mass lesion right upper lobe with hypermetabolic  mediastinal and right hilar lymphadenopathy. Hypermetabolic right adrenal mass  is concerning for metastatic disease. [facility] Results (most recent):  Results from [place] encounter on 08/27/21    [facility] 3D HEBER W MAMMO BI SCREENING INCL CAD    Narrative  STUDY: Bilateral digital screening mammogram with 3-D tomosynthesis    INDICATION:  Screening. COMPARISON: 2013    BREAST COMPOSITION: There are scattered areas of fibroglandular density.     FINDINGS: Bilateral digital screening mammography was performed and is  interpreted in conjunction with a computer assisted detection (CAD) system. Additionally, tomosynthesis of both breasts in the CC and MLO projections was  performed. Bilateral implant displaced views are performed. Bilateral  submuscular implants are stable. No suspicious masses or calcifications are  identified. There has been no significant change. Impression  BI-RADS 2: Benign. No mammographic evidence of malignancy. RECOMMENDATIONS:  Next screening mammogram is recommended in one year. The patient will be notified of these results. MRI Cervical Spine 9/12/21  IMPRESSION:  1. C4 vertebral tumor/metastasis extending into the right foramen, without  spinal stenosis. 2. Multilevel cervical degenerative disc disease. 3. No fracture or subluxation. MRI Results (most recent):  Results from [place] encounter on 12/01/21    MRI CERV SPINE W WO CONT    Narrative  EXAM: MRI CERV SPINE W WO CONT    INDICATION: . Secondary malignant neoplasm of bone    Exam:  MRI cervical spine. Comparisons:  8/16/2021, 9/21/2021    Sequences:  Sagittal T1, T2, and STIR. Axial T1, T2. After the intravenous  administration of Field 1  mL of Dotarem sagittal and axial T1-weighted images  were obtained . FINDINGS:  Alignment is normal.  Again demonstrated is abnormal marrow signal  within the C4 vertebral body. This has decreased in size now involving the  posterior half of the vertebral body. There are areas of cortical bone loss. Again demonstrated is extension of tumor into the right C4-C5 foramen and  involvement of the right C4-C5 facets. Overall marrow signal is decreased but no  other areas of definite tumor involvement are demonstrated. There is multilevel  endplate degenerative change. The visualized posterior fossa and cord signal  are normal.  Paraspinous soft tissues are within normal limits.         There is mild multilevel degenerative change with disc height loss and spurring  which has not progressed in the interval.    Impression  1. Decreased marrow replacement within the C4 vertebral body however there is  persistent enhancing tissue within the right foramen and involvement of the  right C4-5 facet compatible with extraosseous tumor spread. There is no new  epidural component or new cord compression  2. No new metastatic disease to the cervical spine    Brain MRI 12/1/21  IMPRESSION:  Postsurgical changes related to left posterior lateral temporal lobe mass  resection, with minimal curvilinear enhancement but no definite residual or  recurrent tumor. No new metastases    CT Results (most recent):  Results from Hospital Encounter encounter on 01/17/22    CT ABD PELV W CONT    Narrative  EXAM: CT ABD PELV W CONT, CT CHEST W CONT    INDICATION: Met lung cancer, on Pembro, eval response, use RECIST    COMPARISON: CT chest 7/23/2021    CONTRAST: 100 mL of Isovue-370. TECHNIQUE:  Following the uneventful intravenous administration of contrast, thin axial  images were obtained through the chest, abdomen and pelvis. Coronal and sagittal  reconstructions were generated. Oral contrast was  administered for optimal  bowel opacification and visualization. . CT dose reduction was achieved through  use of a standardized protocol tailored for this examination and automatic  exposure control for dose modulation. FINDINGS:  The thyroid gland is unremarkable. There are bilateral breast implants. There is no axillary adenopathy. Mediastinum:    On axial image 27 there is a pretracheal lymph node measuring 1.4 x 1.1 cm. Previously this was seen on image 24 measuring 2.8 x 1.6 cm. [place]. Just inferior to the right upper lobe bronchus on axial image 31 is a 2.1 x 2.1  cm lymph node. Previously this was seen on image 28 and measured 3.6 x 2.9 cm. No new areas of adenopathy. Small pericardial effusion is noted. No pleural effusions.     The lungs demonstrate a spiculated pulmonary nodule in the apical segment of the  right upper lobe. On image 18 this measures 2.2 x 2.1 cm. Previously on image 12  is measured 2.4 x 2.1 cm. Posteriorly in the right upper lobe at the apex on axial image 13 is a new 6 mm  nodular density. This was not present previously. Additionally, on axial image 12 is a new 3 mm nodule in the right upper lobe. On  axial image 30 there is a new 3 mm nodule in the superior segment of the right  lower lobe. There is also a new nodule inferiorly in the right upper lobe on  image 30 measuring approximately 3 mm. On axial image 40 is a new 3 mm nodule anteriorly in the right lower lobe. There is mild linear scarring in the left lower lobe. There are mild dependent  areas of atelectasis in both lower lobes. .    Abdomen and pelvis:      LIVER: No mass. BILIARY TREE: Gallbladder is within normal limits. CBD is not dilated. SPLEEN: within normal limits. PANCREAS: No mass or ductal dilatation. ADRENALS: There is a necrotic mass in the right adrenal gland. On axial image 61  this measures 3.5 x 3.4 cm. Previously on axial image 60 this measured 1.7 x 1.1  cm. This right adrenal lesion has increased significantly in size and has some  mass effect on the posterior aspect of the inferior vena cava without evidence  for obstruction. Medial to the inferior vena cava and superior to the left renal vein is a new 11  mm area of adenopathy. There is also a new lymph node measuring slightly less  than 1 cm in the interaortocaval space on image 70. KIDNEYS: No mass, calculus, or hydronephrosis. STOMACH: Unremarkable. SMALL BOWEL: No dilatation or wall thickening. COLON: No dilatation or wall thickening. The sigmoid colon and rectum are under  distended but there appears to be dense enhancing mucosa. This suggest possible  infectious or inflammatory process or drug reaction. Exact etiology is  uncertain.  Clinical correlation is suggested. The appendix is normal.    PERITONEUM: No ascites or pneumoperitoneum. RETROPERITONEUM: No lymphadenopathy or aortic aneurysm. REPRODUCTIVE ORGANS: The uterus and ovaries are unremarkable. URINARY BLADDER: No mass or calculus. BONES: No destructive bone lesion. ABDOMINAL WALL: No mass or hernia. ADDITIONAL COMMENTS: N/A    Impression  1. The spiculated pulmonary nodule in the right upper lobe persists and has  minimally decreased. The right-sided mediastinal adenopathy and right hilar  adenopathy persists but has decreased. 2. There are new small pulmonary nodules present in the lung fields as described  above. Largest is in the right upper lobe measuring 6 mm. These are nonspecific  but could be due to metastases. 3. The right adrenal gland has increased significantly in size and is now  heterogeneous appearance with areas of decreased attenuation consistent with  necrosis. There is new areas of adenopathy just medial to the inferior vena cava  at the level just superior to the left renal vein. There is also a new but less  than 1 cm lymph node in the aorta caval space inferior to the level of the left  renal vein. .  4. Apparent enhancement of the mucosa of the sigmoid colon and rectum suggests  infectious or inflammatory process. Drug reaction is a consideration. Evaluation  is somewhat limited due to lack of distention. Clinical correlation is  suggested. Records reviewed and summarized above. Pathology report(s) reviewed above. Radiology report(s) reviewed above. Assessment/PLAN:     1)  Stage 4 PD Carcinoma Nonspecific Path likely Lung Primary with Brain/Bone Mets    Spiculated RUL lung mass on CT 7/21  Post neurosurgery 8/11/21 and path likely lung could be breast  Since likely lung and PDL1 90% and can do immunotherapy alone. Prior molecular testing negative except for PD-L1 90%. PET +in lung and mediastinal LNs/possible adrenal met.    She had Gamma Knife with Dr. Karen Helton on 9/9/21. She had a mammogram that was negative. Patient started Dion Nasima on 9/16/21. Seen today for fu. Had CTs done and ? Immunotherapy reaction. Started steroids and pt feeling better. Pt decreased steroids due to side effects. Will continue taper to off. Will monitor symptoms. Hold [place] today. Reviewed scans with pt today. Will review at cancer conference. Likely will do fu CT in a month. 2) Brain Metastases   Post neurosurgery. She had Gamma Knife with Dr. Fernand Favre on 9/9/21. Brain MRI 12/1/21 was stable. 3) C-Spine Bon Metastases   Completed XRT on 10/21/21. 4) Cancer Related Pain    per Palliative Care. Pain is controlled per patient. 5) Smoker  She is using Nicotine gum. She has cut back significantly. Offered smoking cessation class with NN but patient declined at this time. Management per PCP. 6) Chronic Anxiety   Xanax PRN per PCP. 7) Occasional Headaches  Controlled with pain medications. 8) Management of High Risk Medications - Keytruda  Toxicities include ? Pneumonitis on CT/ inflammatory changes. Hold [place] today. On steroids. Will continue to monitor side effects. 9) Psychosocial  Mood good, coping well with family support. 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 Patient does not want COVID vaccine. Reviewed higher risk of getting COVID/delta variant and dying of COVID due to no vaccine. She is  with kids/grandkids, was in [employer]. SW/NN support as needed. She is here alone today. Call if questions. Follow up in 4 weeks     I appreciate the opportunity to participate in Ms. Milla Fonseca's care.     Signed By: Gwen Cardoza, DO

## 2022-01-27 NOTE — PROGRESS NOTES
[facility] SHORT VISIT NOTE    [id number]. Pt arrived to [address] [place] [address] ambulatory and in no distress for Peripheral Labs. Denies any new complaints. Peripheral labs drawn in Left AC x 1 stick. Labs sent for processing. Please see [facility] for final results. Patient Vitals for the past 12 hrs:   Temp Pulse Resp BP SpO2   01/27/22 1109 (!) 96.4 °F (35.8 °C) 85 18 (!) 100/50 100 %     1115. Discharged home ambulatory and in no distress. Tolerated treatment well.  Next appointment 3/10/22 at 11:00 am

## 2022-01-31 PROBLEM — R10.84 ABDOMINAL PAIN, GENERALIZED: Status: ACTIVE | Noted: 2022-01-01

## 2022-01-31 NOTE — PROGRESS NOTES
Palliative Medicine Outpatient Services  [place]: [phone])    Patient Name: Charanjit Pool  YOB: 1960    Date of Current Visit: 01/31/22  Location of Current Visit:    [] [facility] Office  [] [facility] Office  [] [facility] Office  [] Home  [x]Synchronous real-time A/V virtual visit    Date of Initial Visit: 9/27/21  Referral from: Loreto Chavez DO  Primary Care Physician: None      SUMMARY:   Charanjit Pool is a 63y.o. 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 Shawna Venegas for symptom management and psychosocial support. She was diagnosed in 7/2021, underwent surgical resection of left temporal mass 8/11/21. She is currently being treated with immune therapy Zabrina Nichols) and has been referred to Radiation-Oncology for C4 lesion. She is receiving cancer-directed therapy with the goal of palliation of symptoms and prolongation of quality life. Quality of life (rather than quantity) is important to her and guides her health care decisions. The patients social history includes: she is  and lives alone. 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 She has an adult daughter, Shell Marrero, who has a 11year old [facility]. She also has a 24year old daughter, Talib, who lives in [place] and plans to attend veterinary school. She has a sister in [place], [place]. She has a sister in [place], [place] who has end stage MS, and an 80year old mother in [place], [place], who is 360 lbs, and currently on hospice. The patient takes care of her mother, and her mother's home, several days each week. She has a best friend, Anabel Romo, who works for [employer]. Palliative Medicine is addressing the following current patient/family concerns: symptoms related to metastatic carcinoma; support in care decisions consistent with her personal goals and values.     Initial Referral Intake note reviewed   PALLIATIVE DIAGNOSES:       ICD-10-CM ICD-9-CM    1. Pain of right scapula  M89.8X1 733.90    2. Abdominal pain, generalized  R10.84 789.07    3. Abdominal bloating  R14.0 787.3    4. Irritable bowel syndrome with both constipation and diarrhea  K58.2 564.1    5. Fatigue, unspecified type  R53.83 780.79    6. Depression, unspecified depression type  F32. A 311    7. Non-small cell lung cancer, unspecified [facility]  C34.90 162.9           PLAN:   Patient Instructions     Dear James Fernandez ,    It was a pleasure seeing you today via televisit. We will see you again in 4 weeks for a televisit. If labs or imaging tests have been ordered for you today, please call the office  at [phone] 48 hours after completion to obtain the results. Your described symptoms were: Fatigue: 10 Drowsiness: 2   Depression: 4 Pain: 7   Anxiety: 6 Nausea: 1   Anorexia: 1 Dyspnea: 4   Best Well-Bein Constipation: Yes   Other Problem (Comment): 0       This is the plan we talked about:      1. Back and neck pain  -Continue extended-release morphine 15-mg every 12 hours  -Continue oxycodone 5-mg: take 1 to 2 tabs every 4 hours as needed  -Continue tylenol 500-mg: take 2 tabs every 6 hours as needed    2. Abdominal pain/gas/bloating/diarrhea/constipation   -Start dicyclomine (bentyl) 20-mg four times daily as needed  -Continue Miralax as needed    3. Fatigue/poor sleep  -Start Ambien CR 12.5-mg at bedtime as needed  -Do not take Xanax with Ambien    4. Anxiety and depression  -Continue Zoloft 50-mg daily  -Continue Xanax 1-mg twice daily as needed  -You typically take 1 to 1.5 Xanax daily  -At your initial visit we talked about the synergistic effect between benzodiazepines (Xanax) and opioids (morphine, oxycodone) which requires that we work together closely to avoid potential adverse side-effects    5.  Lung cancer  -You're receiving Keytruda under the care of Dr. Staci Wan completed radiation therapy to your neck on 10/19 and 10/21    6. Advance Care Planning/Goals-of-Care (we talked about this at your initial visit)  -You receiving cancer-directed therapy with the goal of palliation of symptoms and prolongation of quality life  -Quality of life is more important to you than quantity of life and you make your health care decisions guided by this value  -You've completed your Advance Medical Directives, naming your friend, Alexa Madsen, who is a hospice nurse, as your primary medical decision-maker      This is what you have shared with us about Advance Care Planning:      Primary Decision Maker (Active): Lashell Mayfield - [address] 1/31/2022   Patient's [place] is: Named in scanned ACP document   Confirm Advance Directive Yes, on file   Patient Would Like to Complete Advance Directive -   Does the patient have other document types -           The Palliative Medicine Team is here to support you and your family.        Sincerely,      Nelson Zuniga MD and the Palliative Medicine Team           GOALS OF CARE / TREATMENT PREFERENCES:   [====Goals of Care====]  GOALS OF CARE:  Patient / health care proxy stated goals: See Patient Instructions / Summary    TREATMENT PREFERENCES:   Code Status:  [x] Attempt Resuscitation       [] Do Not Attempt Resuscitation    Advance Care Planning:  [x] [facility] Interdisciplinary Team has updated the ACP Navigator with Decision Maker and Patient Capacity      Primary Decision Maker (Active): Lashell Mayfield - [phone]  Advance Care Planning 1/31/2022   Patient's Healthcare Decision Maker is: Named in scanned ACP document   Confirm Advance Directive Yes, on file   Patient Would Like to Complete Advance Directive -   Does the patient have other document types -       Other:  (If patient appropriate for POST, consider using PALLPOST smart phrase here)    The palliative care team has discussed with patient / health care proxy about goals of care / treatment preferences for patient.  [====Goals of Care====]     PRESCRIPTIONS GIVEN:     Medications Ordered Today   Medications    dicyclomine (BENTYL) 10 mg capsule     Sig: Take 1 Capsule by mouth four (4) times daily as needed for Abdominal Cramps. Dispense:  120 Capsule     Refill:  0    zolpidem CR (AMBIEN CR) 12.5 mg tablet     Sig: Take 1 Tablet by mouth nightly as needed for Sleep. Max Daily Amount: 12.5 mg.     Dispense:  30 Tablet     Refill:  0           FOLLOW UP:     Future Appointments   Date Time Provider Lakeshia Annie   2/24/2022 11:30 AM H3 [facility]   2/24/2022 11:45 AM Miladys Cerda,  [address]  AMB           PHYSICIANS INVOLVED IN CARE:   Patient Care Team:  None as PCP - General  Tina Baptiste DO (Hematology and Oncology)  Brenna Paige RN as Benefits Care Manager  Rafia Bui MD as Physician (Palliative Medicine)  Rafia Bui MD as Physician (Palliative Medicine)       HISTORY:   Reviewed patient-completed ESAS and advance care planning form. Reviewed patient record in prescription monitoring program.    CHIEF COMPLAINT:   Chief Complaint   Patient presents with    Back Pain       HPI/SUBJECTIVE:    The patient is: [x] Verbal / [] Nonverbal      See Plan/Patient Instructions for interval history    From  9/27/2021:  She had a headache and pain in her right chest when she returned from a trip to [facility] last July. She had a chest X-ray which showed the mass in her lung, then had a CT scan which showed the brain mass. She had gamma knife (Dr. Susan Colvin), this helped with the headache. She started [place] about 2 weeks ago. Since then, she's had more pain in her right chest and in her abdomen \"where the cancer is. \"  She's also been having neck pain and a burning pain from her back, under her right shoulder blade.   She's been taking 1 to 2 oxycodone ~ 4 times a day which helps but wears off after 4 to 4.5 hours. She's also been taking tylenol. .  She's going to start radiation therapy tomorrow for the lesion in her neck. She feels very fatigued this morning. She takes care of her grandson, Katya French, 3 days a week while her daughter works. He spends the night with her, too. She also cares for her 80-year old mother, who lives in her own home with hospice support. She goes to her mother's house every morning to help her bath, does the laundry. She hasn't been sleeping well since her cancer diagnosis. She takes Xanax at bedtime which helps her go to sleep, then takes melatonin when she wakes up in the middle of the night. She usually goes back to sleep after taking melatonin. She tried Ambien in the past and it made her feel too sedated the following day, interfered with her work. Her appetite is good. She's had trouble with her bowels all of her life. She goes between constipation and diarrhea. She's never seen a doctor for this. She usually takes Metamucil when she gets constipated. She hasn't noticed any difference in her bowels since she started taking oxycodone. She's been taking Xanax for 15 years for \"anger\" related to family issues. She's used it from time-to-time in the past, more since her cancer diagnosis. She usually takes 1/2 tab during the day if she needs it. She's never taken any other medications for anxiety or depression. She wouldn't have had gamma knife but she needed to get her affairs in order. She works as a respiratory therapist in the ICU at [employer] and she's seen how people can get. She doesn't want to be like that. She now has a will and made plans for her cremation. She had a AMD; her friend, Paul Luis Enrique, is a hospice RN and is her decision-maker.     Clinical Pain Assessment (nonverbal scale for nonverbal patients):   [++++ Clinical Pain Assessment++++]  [++++Pain Severity++++]: Pain: 7  [++++Pain Character++++]: burning under shoulder blade; sharp, knife-like other areas  [++++Pain Duration++++]: since 2021  [++++Pain Effect++++]:  [++++Pain Factors++++]: oxycodone helps; wearing abdominal binder helps  [++++Pain Frequency++++]: constant with varying intensity  [++++Pain Location++++]: neck; under right shoulder blade; upper quadrant abdomen; right lower flank  [++++ Clinical Pain Assessment++++]       FUNCTIONAL ASSESSMENT:     Palliative Performance Scale (PPS):  PPS: 70       PSYCHOSOCIAL/SPIRITUAL SCREENING:     Any spiritual / Islam concerns:  [] Yes /  [x] No    Caregiver Burnout:  [] Yes /  [] No /  [x] No Caregiver Present      Anticipatory grief assessment:   [x] Normal  / [] Maladaptive       ESAS Anxiety: Anxiety: 6    ESAS Depression: Depression: 4       REVIEW OF SYSTEMS:     The following systems were [x] reviewed / [] unable to be reviewed  Systems: constitutional, ears/nose/mouth/throat, respiratory, gastrointestinal, genitourinary, musculoskeletal, integumentary, neurologic, psychiatric, endocrine. Positive findings noted below. Modified ESAS Completed by: provider   Fatigue: 10 Drowsiness: 2   Depression: 4 Pain: 7   Anxiety: 6 Nausea: 1   Anorexia: 1 Dyspnea: 4   Best Well-Bein Constipation: Yes   Other Problem (Comment): 0          PHYSICAL EXAM:     Wt Readings from Last 3 Encounters:   22 145 lb 8 oz (66 kg)   21 144 lb 9.6 oz (65.6 kg)   21 144 lb 9.6 oz (65.6 kg)     There were no vitals taken for this visit.   Last bowel movement: See Nursing Note    Constitutional    [x] Appears well-developed and well-nourished in no apparent distress    [] Abnormal:  Mental status  [x] Alert and awake  [x] Oriented to person/place/time  [x] Able to follow commands  [] Abnormal:   Eyes  [x] EOM normal   [x] Sclera normal   [x] No visible ocular discharge  [] Abnormal:   HENT  [x] Normocephalic, atraumatic  [x] Mouth/Throat: Moist mucous membranes   [x] External Ears normal  [] Abnormal:  Neck  [x] No visualized mass  [] Abnormal:  Pulmonary/Chest   [x] Respiratory effort normal  [x] No visualized signs of difficulty breathing or respiratory distress  [] Abnormal:  Musculoskeletal  [x] Normal gait with no signs of ataxia  [x] Normal range of motion of neck  [] Abnormal:  Neurological:   [x] No facial asymmetry (Cranial nerve 7 motor function)  [x] No gaze palsy  [] Abnormal:   Skin  [x] No significant exanthematous lesions or discoloration noted on facial skin  [] Abnormal:                                  Psychiatric  [x] Normal affect  [x] No hallucinations  [] Abnormal:    Other pertinent observable physical exam findings:    Due to this being a TeleHealth evaluation, many elements of the physical examination are unable to be assessed. HISTORY:     Past Medical History:   Diagnosis Date    Cancer [facility]) 2021    LUNGS AND BRAIN AND LYMPH NODES    Hypercholesterolemia     Metastatic carcinoma ([facility] [address] 8/19/2021      Past Surgical History:   Procedure Laterality Date    HX BREAST AUGMENTATION      HX HERNIA REPAIR      IMPLANT BREAST SILICONE/EQ Bilateral 0655    sub-pectoral      Family History   Problem Relation Age of Onset    Depression Mother     Obesity Mother     Heart Disease Mother     Mult Sclerosis Sister         END STAGE    Cancer Brother         TONSIL, WITH METS TO LIVER AND BONES    Heart Disease Maternal Grandfather     Cancer Paternal Grandmother     Anesth Problems Neg Hx       History reviewed, no pertinent family history. Social History     Tobacco Use    Smoking status: Current Every Day Smoker     Packs/day: 0.75     Years: 40.00     Pack years: 30.00    Smokeless tobacco: Never Used   Substance Use Topics    Alcohol use:  Yes     Alcohol/week: 3.0 standard drinks     Types: 3 Cans of beer per week     Comment: socially     No Known Allergies   Current Outpatient Medications   Medication Sig    ALPRAZolam (XANAX) 1 mg tablet Take 1 Tablet by mouth two (2) times daily as needed for Anxiety. Max Daily Amount: 2 mg. TAKE 1 TABLET BY MOUTH EVERY 4 HOURS AS NEEDED FOR ANXIETY    morphine CR (MS CONTIN) 15 mg CR tablet Take 1 Tablet by mouth every twelve (12) hours for 30 days. Max Daily Amount: 30 mg.    oxyCODONE IR (ROXICODONE) 5 mg immediate release tablet Take 1 Tablet by mouth every four (4) hours as needed for Pain for up to 20 days. Max Daily Amount: 30 mg.    diphenoxylate-atropine (LomotiL) 2.5-0.025 mg per tablet Take 2 Tablets by mouth two (2) times daily as needed for Diarrhea. Max Daily Amount: 4 Tablets.  levETIRAcetam (KEPPRA) 500 mg tablet TAKE ONE TABLET BY MOUTH TWO TIMES A DAY    albuterol (PROVENTIL HFA, VENTOLIN HFA, PROAIR HFA) 90 mcg/actuation inhaler Take 2 Puffs by inhalation every four (4) hours as needed for Wheezing.  ferrous sulfate (IRON PO) Take  by mouth.  sertraline (ZOLOFT) 50 mg tablet Take 1 Tablet by mouth daily.  polyethylene glycol (Miralax) 17 gram/dose powder Take 17 g by mouth as needed for Constipation.  simethicone (GAS-X) 125 mg capsule Take 125 mg by mouth daily as needed for Flatulence.  acetaminophen (TYLENOL) 325 mg tablet Take 2 Tablets by mouth every six (6) hours as needed for Pain.  tiotropium (Spiriva with HandiHaler) 18 mcg inhalation capsule Take 1 Capsule by inhalation daily.  medroxyPROGESTERone (Provera) 5 mg tablet Take 5 mg by mouth daily.  estradioL (Estrace) 0.5 mg tablet Take 0.5 mg by mouth daily.  rosuvastatin (CRESTOR) 10 mg tablet TAKE 1 TABLET BY MOUTH EVERY DAY    predniSONE (DELTASONE) 20 mg tablet Take 60 mg by mouth daily (with breakfast). (Patient not taking: Reported on 1/31/2022)    QUEtiapine (SEROquel) 25 mg tablet Take 12.5-mg at bedtime as needed for sleep (Patient not taking: Reported on 1/31/2022)    MULTIVITAMIN PO Take  by mouth.  naloxone (Narcan) 4 mg/actuation nasal spray Use 1 spray intranasally, then discard.  Repeat with new spray every 2 min as needed for opioid overdose symptoms, alternating nostrils.  dexAMETHasone (DECADRON) 4 mg tablet Take 1 tab PO every 12 hours tonight then 1/2 a tab PO every 12 hours x 2 days then 1/2 a tab daily x 2 days then stop. (Patient not taking: Reported on 9/27/2021)    varenicline (Chantix) 1 mg tablet Take 1 mg by mouth two (2) times daily (after meals). (Patient not taking: Reported on 9/27/2021)     No current facility-administered medications for this visit. LAB DATA REVIEWED:     Lab Results   Component Value Date/Time    WBC 21.7 (H) 01/27/2022 11:11 AM    HGB 10.3 (L) 01/27/2022 11:11 AM    PLATELET 908 (H) 24/84/7266 11:11 AM     Lab Results   Component Value Date/Time    Sodium 137 01/27/2022 11:11 AM    Potassium 4.8 01/27/2022 11:11 AM    Chloride 107 01/27/2022 11:11 AM    CO2 27 01/27/2022 11:11 AM    BUN 8 01/27/2022 11:11 AM    Creatinine 0.65 01/27/2022 11:11 AM    Calcium 9.4 01/27/2022 11:11 AM      Lab Results   Component Value Date/Time    Alk. phosphatase 127 (H) 01/27/2022 11:11 AM    Protein, total 6.8 01/27/2022 11:11 AM    Albumin 2.6 (L) 01/27/2022 11:11 AM    Globulin 4.2 (H) 01/27/2022 11:11 AM     No results found for: INR, PTMR, PTP, PT1, PT2, APTT, INREXT, INREXT   Lab Results   Component Value Date/Time    Iron 31 (L) 10/28/2021 11:08 AM    TIBC 205 (L) 10/28/2021 11:08 AM    Iron % saturation 15 (L) 10/28/2021 11:08 AM    Ferritin 1,539 (H) 10/28/2021 11:08 AM        CT chest 7/23/21:  Spiculated right upper lobe mass with positive right hilar and mediastinal  adenopathy. Probable hepatic and possible left adrenal metastases. Recommend  PET/CT. MRI brain 8/3/21:  1. 2.5 cm left temporal lobe mass with subacute hemorrhage. Considering  patient's history this is most likely metastasis. 2. Mild nonspecific white matter T2 hyperintensity may be related to mild  chronic small vessel ischemic change.     PET-CT 4/78/57:  Hypermetabolic mass lesion right upper lobe with hypermetabolic  mediastinal and right hilar lymphadenopathy. Hypermetabolic right adrenal mass  is concerning for metastatic disease.     MRI cervical spine 9/12/21:  1. C4 vertebral tumor/metastasis extending into the right foramen, without  spinal stenosis. 2. Multilevel cervical degenerative disc disease. 3. No fracture or subluxation. CONTROLLED SUBSTANCES SAFETY ASSESSMENT (IF ON CONTROLLED SUBSTANCES):     Reviewed opioid safety handout:  [x] Yes   [] No  24 hour opioid dose >150mg morphine equivalent/day:  [] Yes   [x] No  Benzodiazepines:  [x] Yes   [] No  Sleep apnea:  [] Yes   [x] No  Urine Toxicology Testing within last 6 months:  [] Yes   [x] No  History of or new aberrant medication taking behaviors:  [] Yes   [] No  Has Narcan been prescribed [] Yes   [x] No          Total time:   Counseling / coordination time:   > 50% counseling / coordination?:     Consent:  He and/or health care decision maker is aware that that he may receive a bill for this telehealth service, depending on his insurance coverage, and has provided verbal consent to proceed: Yes    CPT Codes 70833-66967 for Established Patients may apply to this Telehealth Visit    Pursuant to the emergency declaration under the [facility] waiver authority and the [employer] Act, this Virtual  Visit was conducted, with patient's consent, to reduce the patient's risk of exposure to COVID-19 and provide continuity of care for an established patient. Services were provided through a video synchronous discussion virtually to substitute for in-person clinic visit.

## 2022-01-31 NOTE — PATIENT INSTRUCTIONS
Dear Shelley Ledbetter ,    It was a pleasure seeing you today via televisit. We will see you again in 4 weeks for a televisit. If labs or imaging tests have been ordered for you today, please call the office  at [phone] 48 hours after completion to obtain the results. Your described symptoms were: Fatigue: 10 Drowsiness: 2   Depression: 4 Pain: 7   Anxiety: 6 Nausea: 1   Anorexia: 1 Dyspnea: 4   Best Well-Bein Constipation: Yes   Other Problem (Comment): 0       This is the plan we talked about:      1. Back and neck pain  -Continue extended-release morphine 15-mg every 12 hours  -Continue oxycodone 5-mg: take 1 to 2 tabs every 4 hours as needed  -Continue tylenol 500-mg: take 2 tabs every 6 hours as needed    2. Abdominal pain/gas/bloating/diarrhea/constipation   -Start dicyclomine (bentyl) 20-mg four times daily as needed  -Continue Miralax as needed    3. Fatigue/poor sleep  -Start Ambien CR 12.5-mg at bedtime as needed  -Do not take Xanax with Ambien    4. Anxiety and depression  -Continue Zoloft 50-mg daily  -Continue Xanax 1-mg twice daily as needed  -You typically take 1 to 1.5 Xanax daily  -At your initial visit we talked about the synergistic effect between benzodiazepines (Xanax) and opioids (morphine, oxycodone) which requires that we work together closely to avoid potential adverse side-effects    5. Lung cancer  -You're receiving Keytruda under the care of Dr. Tato Spence completed radiation therapy to your neck on 10/19 and 10/21    6.  Advance Care Planning/Goals-of-Care (we talked about this at your initial visit)  -You receiving cancer-directed therapy with the goal of palliation of symptoms and prolongation of quality life  -Quality of life is more important to you than quantity of life and you make your health care decisions guided by this value  -You've completed your Advance Medical Directives, naming your friend, Ethelda Bence, who is a hospice nurse, as your primary medical decision-maker      This is what you have shared with us about Advance Care Planning:      Primary Decision Maker (Active): Ion [facility] - [phone]  Advance Care Planning 1/31/2022   Patient's Healthcare Decision Maker is: Named in scanned ACP document   Confirm Advance Directive Yes, on file   Patient Would Like to Complete Advance Directive -   Does the patient have other document types -           The Palliative Medicine Team is here to support you and your family.        Sincerely,      Soy Torres MD and the Palliative Medicine Team

## 2022-01-31 NOTE — PROGRESS NOTES
Palliative Medicine Office Visit  Palliative Medicine Nurse Check In  [phone]    Patient Name: Alessandra Cat  YOB: 1960      Date of Office Visit: 1/31/2022    Patient states: \" To discuss initiating ambien ER  \"    From Check In Sheet (scanned in Media):  Has a medical provider talked with you about cardiopulmonary resuscitation (CPR)? [x] Yes   [] No   [] Unable to obtain    Nurse reminder to complete or update ACP FlowSheet:    Is ACP on the Problem List?    [x] Yes    [] No  IF ACP Document is ON FILE; Nurse to place ACP on Problem List     Is there an ACP Note in Chart Review/Note? [x] Yes    [] No   If NO: ALERT PROVIDER          Primary Decision Maker (Active): Edd Yi  [phone]  Advance Care Planning 1/31/2022   Patient's Healthcare Decision Maker is: Named in scanned ACP document   Confirm Advance Directive Yes, on file   Patient Would Like to Complete Advance Directive -   Does the patient have other document types -       Is there anything that we should know about you as a person in order to provide you the best care possible? Have you been to the ER, urgent care clinic since your last visit? [] Yes   [x] No   [] Unable to obtain    Have you been hospitalized since your last visit? [] Yes   [x] No   [] Unable to obtain    Have you seen or consulted any other health care providers outside of the [address] [place] [address] since your last visit?    [] Yes   [x] No   [] Unable to obtain    Functional status (describe):         Last BM: 1/31/2022     accessed (date): 1/31/2022    Bottle review (for opioid pain medication):  Medication 1:   Date filled:   Directions:   # filled:   # left:   # pills taking per day:  Last dose taken:    Medication 2:   Date filled:   Directions:   # filled:   # left:   # pills taking per day:  Last dose taken:    Medication 3:   Date filled:   Directions:   # filled:   # left:   # pills taking per day:  Last dose taken:     Medication 4:   Date filled:   Directions:   # filled:   # left:   # pills taking per day:  Last dose taken:

## 2022-02-09 NOTE — TELEPHONE ENCOUNTER
Pt has questions about her [facility] paperwork and needs an order for her PET scan.  Fax number for [facility] paperwork is [phone]    CB# is [phone]

## 2022-02-09 NOTE — TELEPHONE ENCOUNTER
Per Dr. Dasilva Fairly last office note - will repeat CTs in 1 month. CTs C/A/P ordered. Please let patient know.

## 2022-02-09 NOTE — TELEPHONE ENCOUNTER
2/9/22 AT 4:40 PM - Called the patient and left a message on self identified voicemail that per [place] NP orders for CT scans were placed and to schedule those to be done in 1 month and to call this office back and verify the paperwork she is following up on because paperwork to continue disability was faxed a little over 1 week ago.

## 2022-02-11 NOTE — TELEPHONE ENCOUNTER
The patient states [facility] sent a request for medical records, but they have not been received.

## 2022-02-11 NOTE — TELEPHONE ENCOUNTER
Palliative Medicine  Nursing Note  [phone]  Fax [phone]      Telephone Call  Patient Name: Shayne Blanco  YOB: [birth year]        Primary Decision Maker (Active): Argentina Nicholas - [phone]   Advance Care Planning 1/31/2022   Patient's Healthcare Decision Maker is: Named in scanned ACP document   Confirm Advance Directive Yes, on file   Patient Would Like to Complete Advance Directive -   Does the patient have other document types -       Call placed to Ms. Fonseca and shared that records would come from the medical records dept and they would send them to requester. Encouraged her to have [employer] re-fax request to medical record dept. She verbalized understanding.     Nathan Joyce, RN  Palliative Medicine

## 2022-02-14 NOTE — TELEPHONE ENCOUNTER
Follow up call to patient, [phone]. Left VM on self identifying phone line with update that Provider had ordered CT scans, supplied [employer] phone number. Inquired if had followed up with [facility] to confirm receipt of paperwork faxed by this office. Update to Provider.

## 2022-02-15 NOTE — TELEPHONE ENCOUNTER
Calling regarding some paperwork for long term cancer benefits that she needs MD to  fax records. Advised we have not received anything as of today. Also records need to go to [place] [address] or they will close her claim by 2/23/2022. Provided MD's PSR information so she can send to her for nurse to follow up and send records electronically thru computer.

## 2022-02-15 NOTE — TELEPHONE ENCOUNTER
Returned patient call, ID verified X 2. Updated patient that [employer] forms had not been received by office. Recent submission to [facility] is noted. Per patient she will email to RN, supplied [address] [place] [address] email address. Update to Provider.

## 2022-02-15 NOTE — TELEPHONE ENCOUNTER
Pt is calling to see if records were sent to [facility] [employer]. If it is not sent by 2/23 her claim will be denied.     CB# for pt is [phone]

## 2022-02-16 NOTE — TELEPHONE ENCOUNTER
Palliative Medicine  Nursing Note  [phone]  Fax [phone]      Telephone Call  Patient Name: Keenan Toribio  YOB: [birth year]        Primary Decision Maker (Active): Guillermina Do - [phone]   Advance Care Planning 1/31/2022   Patient's Healthcare Decision Maker is: Named in scanned ACP document   Confirm Advance Directive Yes, on file   Patient Would Like to Complete Advance Directive -   Does the patient have other document types -     As requested from Ms. Fonseca, per [facility], records related to Dr. Jenise Pitts visits from start date of 9/27/21-1/31/22  (4 visits) faxed to [facility] at [phone] for long term disability review. Verification of receipt noted. Call placed to Ms. Fonseca to inform of the above. She was appreciative.     Fawad Skaggs, RN  Palliative Medicine

## 2022-02-16 NOTE — TELEPHONE ENCOUNTER
Call to patient, ID verified X 2. Updated patient that forms to be sent via email for [facility] were not received by RN. Per patient she will sent form via [facility] to RN. Update to Provider.

## 2022-02-18 NOTE — TELEPHONE ENCOUNTER
Returned call from patient, [phone]. Left VM on self identifying phone line that [facility] [employer] forms had not been received by RN/office. Supplied office phone and fax number, as well as RN bshsi email if wishes to email instead. Update to Provider.

## 2022-02-18 NOTE — TELEPHONE ENCOUNTER
Follow up call to patient, see Telephone Encounter, that RN has not received [employer] forms via fax, email, or [facility]. Update to Provider.

## 2022-02-23 NOTE — PROGRESS NOTES
Tell pt slight increase in lung nodules and adrenal mass/ stable LAD  Can continue or hold immunotherapy and will discuss at visit

## 2022-02-26 NOTE — PROGRESS NOTES
Palliative Medicine Outpatient Services  [place]: [phone] ([phone])    Patient Name: Laury Grayson  YOB: 1960    Date of Current Visit: 02/28/22  Location of Current Visit:    [] [address] [place] [address] Office  [] [facility] Office  [] [facility] Office  [] Home  [x]Synchronous real-time A/V virtual visit    Date of Initial Visit: 9/27/21  Referral from: Shaina Dunn DO  Primary Care Physician: None      SUMMARY:   Laury Grasyon is a 63y.o. 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 Krishan Hui for symptom management and psychosocial support. She was diagnosed in 7/2021, underwent surgical resection of left temporal mass 8/11/21. She is currently being treated with immune therapy Zachary Ruiz) and has been referred to Radiation-Oncology for C4 lesion. She is receiving cancer-directed therapy with the goal of palliation of symptoms and prolongation of quality life. Quality of life (rather than quantity) is important to her and guides her health care decisions. The patients social history includes: she is  and lives alone. 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 She has an adult daughter, Ernesto Lagunas, who has a 11year old [facility]. She also has a 24year old daughter, Bailey Painting, who lives in [place] and plans to attend veterinary school. She has a sister in [place], [place]. She has a sister in [place], [place] who has end stage MS, and an 80year old mother in [place], [place], who is 360 lbs, and currently on hospice. The patient takes care of her mother, and her mother's home, several days each week. She has a best friend, Tanisha Méndez, who works for [employer].        Palliative Medicine is addressing the following current patient/family concerns: symptoms related to metastatic carcinoma; support in care decisions consistent with her personal goals and values. Initial Referral Intake note reviewed   PALLIATIVE DIAGNOSES:       ICD-10-CM ICD-9-CM    1. Right flank pain  R10.9 789.09 morphine CR (MS CONTIN) 30 mg CR tablet      oxyCODONE IR (ROXICODONE) 5 mg immediate release tablet   2. Pain of right scapula  M89.8X1 733.90 morphine CR (MS CONTIN) 30 mg CR tablet      oxyCODONE IR (ROXICODONE) 5 mg immediate release tablet   3. Irritable bowel syndrome with both constipation and diarrhea  K58.2 564.1    4. Fatigue, unspecified type  R53.83 780.79    5. Poor sleep  Z72.820 V69.4 zolpidem CR (AMBIEN CR) 12.5 mg tablet   6. Depression, unspecified depression type  F32. A 311    7. Anxiety  F41.9 300.00    8. Non-small cell lung cancer, unspecified laterality (HCC)  C34.90 162.9 morphine CR (MS CONTIN) 30 mg CR tablet      oxyCODONE IR (ROXICODONE) 5 mg immediate release tablet   9. Metastatic carcinoma (HCC)  C79.9 199.1 morphine CR (MS CONTIN) 30 mg CR tablet      oxyCODONE IR (ROXICODONE) 5 mg immediate release tablet          PLAN:   Patient Instructions     Dear Jacky Nails ,    It was a pleasure seeing you today via televisit. We will see you again in 4 weeks for a televisit. If labs or imaging tests have been ordered for you today, please call the office  at [phone] 48 hours after completion to obtain the results. Your described symptoms were: Fatigue: 10 Drowsiness: 5   Depression: 5 Pain: 10   Anxiety: 8 Nausea: 4   Anorexia: 8 Dyspnea: 2   Best Well-Bein Constipation: No   Other Problem (Comment): 10 (diarrhea)       This is the plan we talked about:      1.  Back and neck pain  -You've been having more pain in your right back \"where the kidney and adrenal gland\" are located  -You've been taking 1 to 2 oxycodone tabs every 3 to 4 hours, averaging 8 to 9 pills/24 hours  -Increase extended-release morphine to 30-mg every 12 hours  -Increase oxycodone 5-mg to 2 tabs every 4 hours as needed  -Continue tylenol 500-mg: take 2 tabs every 6 hours as needed    2. Abdominal pain/gas/bloating/diarrhea/constipation   -You've had life-long issues with constipation and diarrhea consistent with irritable bowel syndrome  -Continue dicyclomine (bentyl) 20-mg four times daily as needed for intestinal spasms  -Today we talked about your starting a FODMAP diet (see below)  -I recommend you avoid the foods listed below for the next 4 weeks until we meet again    3. Fatigue/poor sleep  -Continue Ambien CR 12.5-mg at bedtime as needed  -Do not take Xanax with Ambien    4. Anxiety and depression  -Continue Zoloft 50-mg daily  -Continue Xanax 1-mg twice daily as needed  -You typically take 1 to 1.5 Xanax daily  -At your initial visit we talked about the synergistic effect between benzodiazepines (Xanax) and opioids (morphine, oxycodone) which requires that we work together closely to avoid potential adverse side-effects    5. Lung cancer  -You completed radiation therapy to your neck on 10/19 and 10/21/21  -Your most recent scans in late February showed slight disease progression on Keytruda  -You see Dr. Lisseth Greenwood today to discuss further treatment plans    6.  Advance Care Planning/Goals-of-Care (we talked about this at your initial visit)  -You receiving cancer-directed therapy with the goal of palliation of symptoms and prolongation of quality life  -Quality of life is more important to you than quantity of life and you make your health care decisions guided by this value  -You've completed your Advance Medical Directives, naming your friend, Ralf Cruz, who is a hospice nurse, as your primary medical decision-maker      This is what you have shared with us about Advance Care Planning:      Primary Decision Maker (Active): Shavonne Melton - [facility]   Patient's [employer] is: Named in scanned ACP document   Confirm Advance Directive Yes, on file   Patient Would Like to Complete Advance Directive -   Does the patient have other document types -           The Palliative Medicine Team is here to support you and your family. Sincerely,      Claudeen Course, MD and the Palliative Medicine Team         Learning About the Low FODMAP Diet for Irritable Bowel Syndrome (IBS)  What is the low-FODMAP diet? A low-FODMAP diet is a way to find out what foods give you digestion problems. You stop eating certain high-FODMAP foods for about 2 months. Then you add them back to see how your body reacts. This is called a \"challenge diet. \" A dietitian or doctor can help you follow this diet. FODMAPs are carbohydrates. They are in many types of foods. FODMAP stands for:  · F ermentable. · O ligosaccharides. · D isaccharides. · M onosaccharides. · A nd p olyols. If you have digestive problems, some of these foods can make your symptoms worse. When you are on this diet, you can still eat certain fruits and vegetables. You can also eat certain grains, meats, fish, and lactose-free milks. What is it used for? If you have irritable bowel syndrome (IBS), you can ease your symptoms by not eating some types of foods. Some people also use this diet for inflammatory bowel disease (IBD) or some food intolerances. High-FODMAP foods can be hard to digest. They pull more fluid into your intestines. They are also easily fermented. This can lead to bloating, belly pain, gas, and diarrhea. The low-FODMAP diet can help you figure out what foods to avoid. And it can help you find foods that are easier to digest.  This diet can help with symptoms of some digestive diseases. But it's not a cure. You will still need to manage your condition. How does it work? You will work with a doctor or dietitian when you start the diet. At first, you won't eat any high-FODMAP foods for a few weeks. Go to www.[employer]. MXP4 to learn more about this diet. Stefanie Hernandez also find links to an debra for your phone or other device.  You'll find low-FODMAP cookbooks there too. After 6 to 8 weeks, you will start to try high-FODMAP foods again. You will add those foods back to your diet, one group at a time. Your doctor or dietitian will probably have you wait a few days before you add each new group of those foods. Keep a food diary. You can write down the foods you try and note how they make you feel. After a few weeks, you may have a better idea of what foods you should avoid and what foods make you feel your best.  What are the risks? There is some risk of not getting all of the vitamins and nutrients you need on the low-FODMAP diet. These include:  · Folate. · Thiamin. · Vitamin B6.  · Calcium. · Vitamin D. Your dietitian or doctor can help you find other sources of these if needed. This diet may limit your fiber intake. Try to plan your meals to include other sources of fiber. What foods are on the low-FODMAP diet? Here is a guide to foods that you can eat, plus the foods that you should avoid, when you are on the low-FODMAP diet. Grains  Okay to eat: Foods made from grains like arrowroot, buckwheat, corn, millet, and oats. You can also eat potato, quinoa, rice, sorghum, tapioca, and teff. Cereals, pasta, breads, corn tortillas and baked goods made from these grains are also okay. (These grains may be labeled \"gluten-free. \")  Avoid: Grains like wheat, barley, and rye. Avoid ingredients such as bulgur, couscous, durum, and semolina. And avoid cereals, breads, and pastas made from these grains. Avoid chickpea, lentil, and pea flour. Proteins  Okay to eat: Most meat, fish, and eggs without high-FODMAP sauces. You can have small amounts of almonds or hazelnuts (10 nuts). Macadamia nuts, peanuts, pecans, pine nuts, and walnuts are also okay. You can also eat isidro and pumpkin seeds, tofu, and tempeh. Avoid: Beans, chickpeas, lentils, and soybeans. Avoid pistachio and cashew nuts. And some sausages may have high-FODMAP ingredients.   Dairy  Okay to eat: Lactose-free dairy milks. Rice milk and almond milk are okay. So are lactose-free yogurts, kefirs, ice creams, and sorbet from low-FODMAP fruits and sweeteners. (These are often labeled \"lactose-free. \") You can have small amounts (2 Tbsp) of cottage, cream, or ricotta cheese. Hard cheeses like cheddar, [place], ROSS, and Swiss are okay. So are small amounts (1 oz) of aged or ripened cheeses like Brie, blue, and feta. Avoid: Milk, including cow, goat, and sheep. Avoid condensed or evaporated milk, buttermilk, custard, cream, sour cream, yogurt, and ice cream. Avoid soy milk. (Check sauces for dairy ingredients.)  Vegetables  Okay to eat: Bamboo shoots, bell peppers, bok joshua, up to ½ cup of broccoli or cabbage (red or white), and cucumbers. Eggplant, green beans, lettuce, olives, parsnips, and potatoes are okay to eat. So are pumpkin, rutabaga, seaweed, sprouts, Swiss chard, and spinach. You can eat scallions (green part only) and squash (not butternut). You can eat tomatoes, turnips, watercress, yams, and zucchini. You can also have small amounts of artichoke hearts (from can, 1 oz), carrots, corn (½ cob), and sweet potato (½ cup). Avoid: Artichokes, asparagus, [place] sprouts, luis manuel cabbage, cauliflower, and celery. And avoid garlic, leeks, mushrooms, okra, onions, scallions (white part), shallots, and peas. Fruits  Okay to eat: Bananas, blueberries, cantaloupe, coconut, grapes, and honeydew. Kiwi, neris, limes, oranges, passion fruit, papaya, and pineapple are also okay. You can eat plantain, raspberries, rhubarb, star fruit, strawberries, tangelo, and tangerine. You can also have small amounts of dried banana chips (up to 10 chips), dried cranberries (1 Tbsp), and shredded coconut (up to ¼ cup). Avoid: Apples, applesauce, apricots, avocados, blackberries, boysenberries, and cherries. Also avoid dates, figs, grapefruit, guava, lychee, and mangoes.  Don't eat nectarines, peaches, pears, persimmon, plums, prunes, tamarillo, or watermelon. And limit most canned and dried fruits. Oils, spices, condiments, and sweeteners  Okay to eat: Vegetable oils (including garlic infused), butter, ghee, lard, and margarine (no trans fat). You can have most fresh herbs like basil, chives, coriander, yee, parsley, rosemary and thyme. You can have salt, jams made from low-FODMAP fruits, mayonnaise, and mustard. Soy sauce, hot sauce (no garlic), tamari, and vinegar are also okay. Sweeteners that are okay include sugar (sucrose), powdered (confectioner's) sugar, brown sugar, glucose, and maple syrup. You can also have some artificial sweeteners like aspartame, saccharine, and stevia. Avoid: Chutneys, hummus, jellies, garlic sauces, and gravies made with onion or garlic. Avoid pickles, relish, some salad dressings and soup stocks, salsa, and tomato paste. And avoid sauces and other foods with high fructose corn syrup, honey, molasses, and agave. Avoid artificial sweeteners (isomalt, mannitol, malitol, sorbitol, and xylitol). Avoid corn syrup solids, fructose, fruit juice concentrate, and polydextrose. Other foods and drinks  Okay to have: Water, soda water, tonic, soft drinks sweetened with sugar, ½ cup of low-FODMAP fruit juice, and most teas and alcohols. You can also eat foods made with baking powder and soda, cocoa, and gelatin. Avoid: Juices from high-FODMAP fruits and vegetables. And avoid fortified luzmaria, chamomile and fennel teas, chicory-based drinks and coffee substitutes, and bouillon cubes. Follow-up care is a key part of your treatment and safety. Be sure to make and go to all appointments, and call your doctor if you are having problems. It's also a good idea to know your test results and keep a list of the medicines you take. Where can you learn more?   Go to http://www.gray.com/  Enter L235 in the search box to learn more about \"Learning About the Low FODMAP Diet for Irritable Bowel Syndrome (IBS). \"  Current as of: December 17, 2020               Content Version: 13.0  © 2006-2021 [employer]. Care instructions adapted under license by [employer] ([place]) (which disclaims liability or warranty for this information). If you have questions about a medical condition or this instruction, always ask your healthcare professional. [facility] [address] any warranty or liability for your use of this information. GOALS OF CARE / TREATMENT PREFERENCES:   [====Goals of Care====]  GOALS OF CARE:  Patient / health care proxy stated goals: See Patient Instructions / Summary    TREATMENT PREFERENCES:   Code Status:  [x] Attempt Resuscitation       [] Do Not Attempt Resuscitation    Advance Care Planning:  [x] The [email or web address] Interdisciplinary Team has updated the ACP Navigator with Decision Maker and Patient Capacity      Primary Decision Maker (Active): Shavonne Melton - [phone]  Advance Care Planning 2/28/2022   Patient's Healthcare Decision Maker is: Named in scanned ACP document   Confirm Advance Directive Yes, on file   Patient Would Like to Complete Advance Directive -   Does the patient have other document types -       Other:  (If patient appropriate for POST, consider using PALLPOST smart phrase here)    The palliative care team has discussed with patient / health care proxy about goals of care / treatment preferences for patient.  [====Goals of Care====]     PRESCRIPTIONS GIVEN:     Medications Ordered Today   Medications    zolpidem CR (AMBIEN CR) 12.5 mg tablet     Sig: Take 1 Tablet by mouth nightly as needed for Sleep. Max Daily Amount: 12.5 mg.     Dispense:  30 Tablet     Refill:  0    morphine CR (MS CONTIN) 30 mg CR tablet     Sig: Take 1 Tablet by mouth every twelve (12) hours for 30 days. Max Daily Amount: 60 mg.      Dispense:  60 Tablet     Refill:  0     Discontinue extended-release morphine 15-mg every 12 hours    oxyCODONE IR (ROXICODONE) 5 mg immediate release tablet     Sig: Take 2 Tablets by mouth every four (4) hours as needed for Pain for up to 10 days. Max Daily Amount: 60 mg. Dispense:  150 Tablet     Refill:  0           FOLLOW UP:     Future Appointments   Date Time Provider Lakeshia Valencia   2/28/2022  1:30 PM [facility]3 [facility]   2/28/2022  1:45 PM Loretta Cerda,  [address]  [facility]           PHYSICIANS INVOLVED IN CARE:   Patient Care Team:  None as PCP - General  aKtie Shah DO (Hematology and Oncology)  Mulugeta Long, GUALBERTO as Benefits Care Manager  Nelson Zuniga MD as Physician (Palliative Medicine)  Nelson Zuniga MD as Physician (Palliative Medicine)       HISTORY:   Reviewed patient-completed ESAS and advance care planning form. Reviewed patient record in prescription monitoring program.    CHIEF COMPLAINT:   Chief Complaint   Patient presents with    Flank Pain       HPI/SUBJECTIVE:    The patient is: [x] Verbal / [] Nonverbal      See Plan/Patient Instructions for interval history    From IV 9/27/2021:  She had a headache and pain in her right chest when she returned from a trip to [facility] last July. She had a chest X-ray which showed the mass in her lung, then had a CT scan which showed the brain mass. She had gamma knife (Dr. Augustina Naidu), this helped with the headache. She started [place] about 2 weeks ago. Since then, she's had more pain in her right chest and in her abdomen \"where the cancer is. \"  She's also been having neck pain and a burning pain from her back, under her right shoulder blade. She's been taking 1 to 2 oxycodone ~ 4 times a day which helps but wears off after 4 to 4.5 hours. She's also been taking tylenol. .  She's going to start radiation therapy tomorrow for the lesion in her neck. She feels very fatigued this morning. She takes care of her grandson, Sheila Hamilton, 3 days a week while her daughter works. He spends the night with her, too.   She also cares for her 80-year old mother, who lives in her own home with hospice support. She goes to her mother's house every morning to help her bath, does the laundry. She hasn't been sleeping well since her cancer diagnosis. She takes Xanax at bedtime which helps her go to sleep, then takes melatonin when she wakes up in the middle of the night. She usually goes back to sleep after taking melatonin. She tried Ambien in the past and it made her feel too sedated the following day, interfered with her work. Her appetite is good. She's had trouble with her bowels all of her life. She goes between constipation and diarrhea. She's never seen a doctor for this. She usually takes Metamucil when she gets constipated. She hasn't noticed any difference in her bowels since she started taking oxycodone. She's been taking Xanax for 15 years for \"anger\" related to family issues. She's used it from time-to-time in the past, more since her cancer diagnosis. She usually takes 1/2 tab during the day if she needs it. She's never taken any other medications for anxiety or depression. She wouldn't have had gamma knife but she needed to get her affairs in order. 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 and she's seen how people can get. She doesn't want to be like that. She now has a will and made plans for her cremation. She had a AMD; her friend, Best Traylor, is a hospice RN and is her decision-maker.     Clinical Pain Assessment (nonverbal scale for nonverbal patients):   [++++ Clinical Pain Assessment++++]  [++++Pain Severity++++]: Pain: 10  [++++Pain Character++++]: burning under shoulder blade; sharp, knife-like other areas  [++++Pain Duration++++]: since August 2021  [++++Pain Effect++++]:  [++++Pain Factors++++]: oxycodone helps; wearing abdominal binder helps  [++++Pain Frequency++++]: constant with varying intensity  [++++Pain Location++++]: neck; under right shoulder blade; upper quadrant abdomen; right lower flank  [++++ Clinical Pain Assessment++++]       FUNCTIONAL ASSESSMENT:     Palliative Performance Scale (PPS):  PPS: 70       PSYCHOSOCIAL/SPIRITUAL SCREENING:     Any spiritual / Restorationism concerns:  [] Yes /  [x] No    Caregiver Burnout:  [] Yes /  [] No /  [x] No Caregiver Present      Anticipatory grief assessment:   [x] Normal  / [] Maladaptive       ESAS Anxiety: Anxiety: 8    ESAS Depression: Depression: 5       REVIEW OF SYSTEMS:     The following systems were [x] reviewed / [] unable to be reviewed  Systems: constitutional, ears/nose/mouth/throat, respiratory, gastrointestinal, genitourinary, musculoskeletal, integumentary, neurologic, psychiatric, endocrine. Positive findings noted below. Modified ESAS Completed by: provider   Fatigue: 10 Drowsiness: 5   Depression: 5 Pain: 10   Anxiety: 8 Nausea: 4   Anorexia: 8 Dyspnea: 2   Best Well-Bein Constipation: No   Other Problem (Comment): 10 (diarrhea)          PHYSICAL EXAM:     Wt Readings from Last 3 Encounters:   22 145 lb 8 oz (66 kg)   21 144 lb 9.6 oz (65.6 kg)   21 144 lb 9.6 oz (65.6 kg)     There were no vitals taken for this visit.   Last bowel movement: See Nursing Note    Constitutional    [x] Appears well-developed and well-nourished in no apparent distress; appears fatigued    [] Abnormal:  Mental status  [x] Alert and awake  [x] Oriented to person/place/time  [x] Able to follow commands  [] Abnormal:   Eyes  [x] EOM normal   [x] Sclera normal   [x] No visible ocular discharge  [] Abnormal:   HENT  [x] Normocephalic, atraumatic  [x] Mouth/Throat: Moist mucous membranes   [x] External Ears normal  [] Abnormal:  Neck  [x] No visualized mass  [] Abnormal:  Pulmonary/Chest   [x] Respiratory effort normal  [x] No visualized signs of difficulty breathing or respiratory distress  [] Abnormal:  Musculoskeletal  [x] Normal gait with no signs of ataxia  [x] Normal range of motion of neck  [] Abnormal:  Neurological:   [x] No facial asymmetry (Cranial nerve 7 motor function)  [x] No gaze palsy  [] Abnormal:   Skin  [x] No significant exanthematous lesions or discoloration noted on facial skin  [] Abnormal:                                  Psychiatric  [x] Normal affect  [x] No hallucinations  [] Abnormal:    Other pertinent observable physical exam findings:    Due to this being a TeleHealth evaluation, many elements of the physical examination are unable to be assessed. HISTORY:     Past Medical History:   Diagnosis Date    Cancer [facility]) 2021    LUNGS AND BRAIN AND LYMPH NODES    Hypercholesterolemia     Metastatic carcinoma ([facility] [address] 8/19/2021      Past Surgical History:   Procedure Laterality Date    HX BREAST AUGMENTATION      HX HERNIA REPAIR      IMPLANT BREAST SILICONE/EQ Bilateral 6648    sub-pectoral      Family History   Problem Relation Age of Onset    Depression Mother     Obesity Mother     Heart Disease Mother     Mult Sclerosis Sister         END STAGE    Cancer Brother         TONSIL, WITH METS TO LIVER AND BONES    Heart Disease Maternal Grandfather     Cancer Paternal Grandmother     Anesth Problems Neg Hx       History reviewed, no pertinent family history. Social History     Tobacco Use    Smoking status: Current Every Day Smoker     Packs/day: 0.75     Years: 40.00     Pack years: 30.00    Smokeless tobacco: Never Used   Substance Use Topics    Alcohol use: Yes     Alcohol/week: 3.0 standard drinks     Types: 3 Cans of beer per week     Comment: socially     No Known Allergies   Current Outpatient Medications   Medication Sig    zolpidem CR (AMBIEN CR) 12.5 mg tablet Take 1 Tablet by mouth nightly as needed for Sleep. Max Daily Amount: 12.5 mg.    morphine CR (MS CONTIN) 30 mg CR tablet Take 1 Tablet by mouth every twelve (12) hours for 30 days.  Max Daily Amount: 60 mg.    oxyCODONE IR (ROXICODONE) 5 mg immediate release tablet Take 2 Tablets by mouth every four (4) hours as needed for Pain for up to 10 days. Max Daily Amount: 60 mg.    dicyclomine (BENTYL) 10 mg capsule Take 1 Capsule by mouth four (4) times daily as needed for Abdominal Cramps.  ALPRAZolam (XANAX) 1 mg tablet Take 1 Tablet by mouth two (2) times daily as needed for Anxiety. Max Daily Amount: 2 mg. TAKE 1 TABLET BY MOUTH EVERY 4 HOURS AS NEEDED FOR ANXIETY    diphenoxylate-atropine (LomotiL) 2.5-0.025 mg per tablet Take 2 Tablets by mouth two (2) times daily as needed for Diarrhea. Max Daily Amount: 4 Tablets.  levETIRAcetam (KEPPRA) 500 mg tablet TAKE ONE TABLET BY MOUTH TWO TIMES A DAY    albuterol (PROVENTIL HFA, VENTOLIN HFA, PROAIR HFA) 90 mcg/actuation inhaler Take 2 Puffs by inhalation every four (4) hours as needed for Wheezing.  ferrous sulfate (IRON PO) Take  by mouth.  MULTIVITAMIN PO Take  by mouth.  sertraline (ZOLOFT) 50 mg tablet Take 1 Tablet by mouth daily.  simethicone (GAS-X) 125 mg capsule Take 125 mg by mouth daily as needed for Flatulence.  acetaminophen (TYLENOL) 325 mg tablet Take 2 Tablets by mouth every six (6) hours as needed for Pain.  tiotropium (Spiriva with HandiHaler) 18 mcg inhalation capsule Take 1 Capsule by inhalation daily.  medroxyPROGESTERone (Provera) 5 mg tablet Take 5 mg by mouth daily.  estradioL (Estrace) 0.5 mg tablet Take 0.5 mg by mouth daily.  rosuvastatin (CRESTOR) 10 mg tablet TAKE 1 TABLET BY MOUTH EVERY DAY    predniSONE (DELTASONE) 20 mg tablet Take 60 mg by mouth daily (with breakfast). (Patient not taking: Reported on 1/31/2022)    QUEtiapine (SEROquel) 25 mg tablet Take 12.5-mg at bedtime as needed for sleep (Patient not taking: Reported on 1/31/2022)    naloxone (Narcan) 4 mg/actuation nasal spray Use 1 spray intranasally, then discard. Repeat with new spray every 2 min as needed for opioid overdose symptoms, alternating nostrils.     polyethylene glycol (Miralax) 17 gram/dose powder Take 17 g by mouth as needed for Constipation. (Patient not taking: Reported on 2/28/2022)    dexAMETHasone (DECADRON) 4 mg tablet Take 1 tab PO every 12 hours tonight then 1/2 a tab PO every 12 hours x 2 days then 1/2 a tab daily x 2 days then stop. (Patient not taking: Reported on 9/27/2021)    varenicline (Chantix) 1 mg tablet Take 1 mg by mouth two (2) times daily (after meals). (Patient not taking: Reported on 9/27/2021)     No current facility-administered medications for this visit. LAB DATA REVIEWED:     Lab Results   Component Value Date/Time    WBC 21.7 (H) 01/27/2022 11:11 AM    HGB 10.3 (L) 01/27/2022 11:11 AM    PLATELET 140 (H) 61/30/6379 11:11 AM     Lab Results   Component Value Date/Time    Sodium 137 01/27/2022 11:11 AM    Potassium 4.8 01/27/2022 11:11 AM    Chloride 107 01/27/2022 11:11 AM    CO2 27 01/27/2022 11:11 AM    BUN 8 01/27/2022 11:11 AM    Creatinine 0.65 01/27/2022 11:11 AM    Calcium 9.4 01/27/2022 11:11 AM      Lab Results   Component Value Date/Time    Alk. phosphatase 127 (H) 01/27/2022 11:11 AM    Protein, total 6.8 01/27/2022 11:11 AM    Albumin 2.6 (L) 01/27/2022 11:11 AM    Globulin 4.2 (H) 01/27/2022 11:11 AM     No results found for: INR, PTMR, PTP, PT1, PT2, APTT, INREXT, INREXT   Lab Results   Component Value Date/Time    Iron 31 (L) 10/28/2021 11:08 AM    TIBC 205 (L) 10/28/2021 11:08 AM    Iron % saturation 15 (L) 10/28/2021 11:08 AM    Ferritin 1,539 (H) 10/28/2021 11:08 AM        CT chest 7/23/21:  Spiculated right upper lobe mass with positive right hilar and mediastinal  adenopathy. Probable hepatic and possible left adrenal metastases. Recommend  PET/CT. MRI brain 8/3/21:  1. 2.5 cm left temporal lobe mass with subacute hemorrhage. Considering  patient's history this is most likely metastasis. 2. Mild nonspecific white matter T2 hyperintensity may be related to mild  chronic small vessel ischemic change.     PET-CT 1/74/39:  Hypermetabolic mass lesion right upper lobe with hypermetabolic  mediastinal and right hilar lymphadenopathy. Hypermetabolic right adrenal mass  is concerning for metastatic disease.     MRI cervical spine 9/12/21:  1. C4 vertebral tumor/metastasis extending into the right foramen, without  spinal stenosis. 2. Multilevel cervical degenerative disc disease. 3. No fracture or subluxation. CT chest/abdomen/pelvis  2/23/22:  1. Interval slight increase in pulmonary nodule burden, including slight  increase in size of a 2.8 cm x 2.2 cm dominant right upper lobe nodule. 2.  Interval increase in size of necrotic right adrenal lesion. 3.  Stable mild mediastinal, right hilar, and upper retroperitoneal  lymphadenopathy. 4.  Slightly increasing small to moderate pericardial effusion.          CONTROLLED SUBSTANCES SAFETY ASSESSMENT (IF ON CONTROLLED SUBSTANCES):     Reviewed opioid safety handout:  [x] Yes   [] No  24 hour opioid dose >150mg morphine equivalent/day:  [] Yes   [x] No  Benzodiazepines:  [x] Yes   [] No  Sleep apnea:  [] Yes   [x] No  Urine Toxicology Testing within last 6 months:  [] Yes   [x] No  History of or new aberrant medication taking behaviors:  [] Yes   [] No  Has Narcan been prescribed [] Yes   [x] No          Total time:   Counseling / coordination time:   > 50% counseling / coordination?:     Consent:  He and/or health care decision maker is aware that that he may receive a bill for this telehealth service, depending on his insurance coverage, and has provided verbal consent to proceed: Yes    CPT Codes 63899-09182 for Established Patients may apply to this Telehealth Visit    Pursuant to the emergency declaration under the [address] [place] [address], [facility] waiver authority and the [employer] Act, this Virtual  Visit was conducted, with patient's consent, to reduce the patient's risk of exposure to COVID-19 and provide continuity of care for an established patient. Services were provided through a video synchronous discussion virtually to substitute for in-person clinic visit.

## 2022-02-28 PROBLEM — R68.83 CHILLS: Status: RESOLVED | Noted: 2021-01-01 | Resolved: 2022-01-01

## 2022-02-28 NOTE — PROGRESS NOTES
OPIC Short Note                 1400 Pt admit to [facility] for labs ambulatory in stable condition. Assessment completed. No new concerns voiced. Valentin Lott NP notified of pt's BP. Orders received for 1L NS over 1 hour. Ms. Glo Thomas vitals were reviewed prior to and after treatment. Patient Vitals for the past 12 hrs:   Temp Pulse Resp BP   02/28/22 1517  80  (!) 91/54   02/28/22 1403  91  (!) 88/52   02/28/22 1359 96.8 °F (36 °C) 99 18 (!) 88/48       Lab results were obtained and reviewed. Recent Results (from the past 12 hour(s))   CBC WITH AUTOMATED DIFF    Collection Time: 02/28/22  2:20 PM   Result Value Ref Range    WBC 19.6 (H) 3.6 - 11.0 K/uL    RBC 4.61 3.80 - 5.20 M/uL    HGB 11.8 11.5 - 16.0 g/dL    HCT 37.0 35.0 - 47.0 %    MCV 80.3 80.0 - 99.0 FL    MCH 25.6 (L) 26.0 - 34.0 PG    MCHC 31.9 30.0 - 36.5 g/dL    RDW 18.6 (H) 11.5 - 14.5 %    PLATELET 134 (H) 381 - 400 K/uL    MPV 10.0 8.9 - 12.9 FL    NRBC 0.0 0  WBC    ABSOLUTE NRBC 0.00 0.00 - 0.01 K/uL    NEUTROPHILS PENDING %    LYMPHOCYTES PENDING %    MONOCYTES PENDING %    EOSINOPHILS PENDING %    BASOPHILS PENDING %    IMMATURE GRANULOCYTES PENDING %    ABS. NEUTROPHILS PENDING K/UL    ABS. LYMPHOCYTES PENDING K/UL    ABS. MONOCYTES PENDING K/UL    ABS. EOSINOPHILS PENDING K/UL    ABS. BASOPHILS PENDING K/UL    ABS. IMM. GRANS.  PENDING K/UL    DF PENDING    METABOLIC PANEL, COMPREHENSIVE    Collection Time: 02/28/22  2:20 PM   Result Value Ref Range    Sodium 132 (L) 136 - 145 mmol/L    Potassium 4.5 3.5 - 5.1 mmol/L    Chloride 101 97 - 108 mmol/L    CO2 28 21 - 32 mmol/L    Anion gap 3 (L) 5 - 15 mmol/L    Glucose 98 65 - 100 mg/dL    BUN 7 6 - 20 MG/DL    Creatinine 0.68 0.55 - 1.02 MG/DL    BUN/Creatinine ratio 10 (L) 12 - 20      GFR est AA >60 >60 ml/min/1.73m2    GFR est non-AA >60 >60 ml/min/1.73m2    Calcium 9.8 8.5 - 10.1 MG/DL    Bilirubin, total 0.2 0.2 - 1.0 MG/DL    ALT (SGPT) 12 12 - 78 U/L    AST (SGOT) 14 (L) 15 - 37 U/L    Alk. phosphatase 154 (H) 45 - 117 U/L    Protein, total 6.9 6.4 - 8.2 g/dL    Albumin 2.9 (L) 3.5 - 5.0 g/dL    Globulin 4.0 2.0 - 4.0 g/dL    A-G Ratio 0.7 (L) 1.1 - 2.2       Medications Administered     sodium chloride (NS) flush 5-10 mL     Admin Date  02/28/2022 Action  Given Dose  10 mL Route  IntraVENous Administered By  Sangeeta Villela RN          sodium chloride 0.9 % bolus infusion 1,000 mL     Admin Date  02/28/2022 Action  New Bag Dose  1,000 mL Rate  1,000 mL/hr Route  IntraVENous Administered By  Sangeeta Villela RN              Ms. Johnson Coombs tolerated the infusion, and had no complaints. PIV removed without difficulty at 1515. Ms. Johnson Coombs was discharged from [place] [address] in stable condition.      Altagracia Barber RN  February 28, 2022  3:25 PM

## 2022-02-28 NOTE — PROGRESS NOTES
Palliative Medicine Office Visit  Palliative Medicine Nurse Check In  [phone]    Patient Name: Dong Moon  YOB: 1960      Date of Office Visit: 2/28/2022    Patient states: \"Increase in pain, to discuss increasing Oxycodone, Morphine, and Xanax\"    From Check In Sheet (scanned in Media):  Has a medical provider talked with you about cardiopulmonary resuscitation (CPR)? [x] Yes   [] No   [] Unable to obtain    Nurse reminder to complete or update ACP FlowSheet:    Is ACP on the Problem List?    [] Yes    [] No  IF ACP Document is ON FILE; Nurse to place ACP on Problem List     Is there an ACP Note in Chart Review/Note? [] Yes    [] No   If NO: ALERT PROVIDER         Is there anything that we should know about you as a person in order to provide you the best care possible? Have you been to the ER, urgent care clinic since your last visit? [] Yes   [x] No   [] Unable to obtain    Have you been hospitalized since your last visit? [] Yes   [x] No   [] Unable to obtain    Have you seen or consulted any other health care providers outside of the [address] [place] [address] since your last visit?    [] Yes   [x] No   [] Unable to obtain    Functional status (describe):         Last BM:  2/28/2022     accessed (date): 2/28/2022    Bottle review (for opioid pain medication):  Medication 1:   Date filled:   Directions:   # filled:   # left:   # pills taking per day:  Last dose taken:    Medication 2:   Date filled:   Directions:   # filled:   # left:   # pills taking per day:  Last dose taken:    Medication 3:   Date filled:   Directions:   # filled:   # left:   # pills taking per day:  Last dose taken:    Medication 4:   Date filled:   Directions:   # filled:   # left:   # pills taking per day:  Last dose taken:

## 2022-02-28 NOTE — TELEPHONE ENCOUNTER
Pt came in 2/28 and wanted to switch her visit to a VV with Dr Alton Loredo. Per becka, pt was told she can see Tonio today as planned or she can schedule next available VV with Dr Alton Loredo. Pt said she was leaving and would call later to reschedule her appt from today with Tonio to a future date.  [address] [facility]

## 2022-02-28 NOTE — PATIENT INSTRUCTIONS
Dear Camron Golden ,    It was a pleasure seeing you today via televisit. We will see you again in 4 weeks for a televisit. If labs or imaging tests have been ordered for you today, please call the office  at [phone] 48 hours after completion to obtain the results. Your described symptoms were: Fatigue: 10 Drowsiness: 5   Depression: 5 Pain: 10   Anxiety: 8 Nausea: 4   Anorexia: 8 Dyspnea: 2   Best Well-Bein Constipation: No   Other Problem (Comment): 10 (diarrhea)       This is the plan we talked about:      1. Back and neck pain  -You've been having more pain in your right back \"where the kidney and adrenal gland\" are located  -You've been taking 1 to 2 oxycodone tabs every 3 to 4 hours, averaging 8 to 9 pills/24 hours  -Increase extended-release morphine to 30-mg every 12 hours  -Increase oxycodone 5-mg to 2 tabs every 4 hours as needed  -Continue tylenol 500-mg: take 2 tabs every 6 hours as needed    2. Abdominal pain/gas/bloating/diarrhea/constipation   -You've had life-long issues with constipation and diarrhea consistent with irritable bowel syndrome  -Continue dicyclomine (bentyl) 20-mg four times daily as needed for intestinal spasms  -Today we talked about your starting a FODMAP diet (see below)  -I recommend you avoid the foods listed below for the next 4 weeks until we meet again    3. Fatigue/poor sleep  -Continue Ambien CR 12.5-mg at bedtime as needed  -Do not take Xanax with Ambien    4. Anxiety and depression  -Continue Zoloft 50-mg daily  -Continue Xanax 1-mg twice daily as needed  -You typically take 1 to 1.5 Xanax daily  -At your initial visit we talked about the synergistic effect between benzodiazepines (Xanax) and opioids (morphine, oxycodone) which requires that we work together closely to avoid potential adverse side-effects    5.  Lung cancer  -You completed radiation therapy to your neck on 10/19 and 10/21/21  -Your most recent scans in late February showed slight disease progression on Keytruda  -You see Dr. Julian Marin today to discuss further treatment plans    6. Advance Care Planning/Goals-of-Care (we talked about this at your initial visit)  -You receiving cancer-directed therapy with the goal of palliation of symptoms and prolongation of quality life  -Quality of life is more important to you than quantity of life and you make your health care decisions guided by this value  -You've completed your Advance Medical Directives, naming your friend, Sariah Soler, who is a hospice nurse, as your primary medical decision-maker      This is what you have shared with us about Advance Care Planning:      Primary Decision Maker (Active): Anna Hamilton [email or web address] 2/28/2022   Patient's [address] is: Named in scanned ACP document   Confirm Advance Directive Yes, on file   Patient Would Like to Complete Advance Directive -   Does the patient have other document types -           The Palliative Medicine Team is here to support you and your family. Sincerely,      Patrick Carpenter MD and the Palliative Medicine Team         Learning About the Low FODMAP Diet for Irritable Bowel Syndrome (IBS)  What is the low-FODMAP diet? A low-FODMAP diet is a way to find out what foods give you digestion problems. You stop eating certain high-FODMAP foods for about 2 months. Then you add them back to see how your body reacts. This is called a \"challenge diet. \" A dietitian or doctor can help you follow this diet. FODMAPs are carbohydrates. They are in many types of foods. FODMAP stands for:  · F ermentable. · O ligosaccharides. · D isaccharides. · M onosaccharides. · A nd p olyols. If you have digestive problems, some of these foods can make your symptoms worse. When you are on this diet, you can still eat certain fruits and vegetables. You can also eat certain grains, meats, fish, and lactose-free milks. What is it used for?   If you have irritable bowel syndrome (IBS), you can ease your symptoms by not eating some types of foods. Some people also use this diet for inflammatory bowel disease (IBD) or some food intolerances. High-FODMAP foods can be hard to digest. They pull more fluid into your intestines. They are also easily fermented. This can lead to bloating, belly pain, gas, and diarrhea. The low-FODMAP diet can help you figure out what foods to avoid. And it can help you find foods that are easier to digest.  This diet can help with symptoms of some digestive diseases. But it's not a cure. You will still need to manage your condition. How does it work? You will work with a doctor or dietitian when you start the diet. At first, you won't eat any high-FODMAP foods for a few weeks. Go to www.[employer] to learn more about this diet. Suniliona Medford also find links to an debra for your phone or other device. You'll find low-FODMAP cookbooks there too. After 6 to 8 weeks, you will start to try high-FODMAP foods again. You will add those foods back to your diet, one group at a time. Your doctor or dietitian will probably have you wait a few days before you add each new group of those foods. Keep a food diary. You can write down the foods you try and note how they make you feel. After a few weeks, you may have a better idea of what foods you should avoid and what foods make you feel your best.  What are the risks? There is some risk of not getting all of the vitamins and nutrients you need on the low-FODMAP diet. These include:  · Folate. · Thiamin. · Vitamin B6.  · Calcium. · Vitamin D. Your dietitian or doctor can help you find other sources of these if needed. This diet may limit your fiber intake. Try to plan your meals to include other sources of fiber. What foods are on the low-FODMAP diet? Here is a guide to foods that you can eat, plus the foods that you should avoid, when you are on the low-FODMAP diet.   Grains  Okay to eat: Foods made from grains like arrowroot, buckwheat, corn, millet, and oats. You can also eat potato, quinoa, rice, sorghum, tapioca, and teff. Cereals, pasta, breads, corn tortillas and baked goods made from these grains are also okay. (These grains may be labeled \"gluten-free. \")  Avoid: Grains like wheat, barley, and rye. Avoid ingredients such as bulgur, couscous, durum, and semolina. And avoid cereals, breads, and pastas made from these grains. Avoid chickpea, lentil, and pea flour. Proteins  Okay to eat: Most meat, fish, and eggs without high-FODMAP sauces. You can have small amounts of almonds or hazelnuts (10 nuts). Macadamia nuts, peanuts, pecans, pine nuts, and walnuts are also okay. You can also eat isidro and pumpkin seeds, tofu, and tempeh. Avoid: Beans, chickpeas, lentils, and soybeans. Avoid pistachio and cashew nuts. And some sausages may have high-FODMAP ingredients. Dairy  Okay to eat: Lactose-free dairy milks. Rice milk and almond milk are okay. So are lactose-free yogurts, kefirs, ice creams, and sorbet from low-FODMAP fruits and sweeteners. (These are often labeled \"lactose-free. \") You can have small amounts (2 Tbsp) of cottage, cream, or ricotta cheese. Hard cheeses like cheddar, [place], ROSS, and Swiss are okay. So are small amounts (1 oz) of aged or ripened cheeses like Brie, blue, and feta. Avoid: Milk, including cow, goat, and sheep. Avoid condensed or evaporated milk, buttermilk, custard, cream, sour cream, yogurt, and ice cream. Avoid soy milk. (Check sauces for dairy ingredients.)  Vegetables  Okay to eat: Bamboo shoots, bell peppers, bok joshua, up to ½ cup of broccoli or cabbage (red or white), and cucumbers. Eggplant, green beans, lettuce, olives, parsnips, and potatoes are okay to eat. So are pumpkin, rutabaga, seaweed, sprouts, Swiss chard, and spinach. You can eat scallions (green part only) and squash (not butternut). You can eat tomatoes, turnips, watercress, yams, and zucchini. You can also have small amounts of artichoke hearts (from can, 1 oz), carrots, corn (½ cob), and sweet potato (½ cup). Avoid: Artichokes, asparagus, [place] sprouts, luis manuel cabbage, cauliflower, and celery. And avoid garlic, leeks, mushrooms, okra, onions, scallions (white part), shallots, and peas. Fruits  Okay to eat: Bananas, blueberries, cantaloupe, coconut, grapes, and honeydew. Kiwi, neris, limes, oranges, passion fruit, papaya, and pineapple are also okay. You can eat plantain, raspberries, rhubarb, star fruit, strawberries, tangelo, and tangerine. You can also have small amounts of dried banana chips (up to 10 chips), dried cranberries (1 Tbsp), and shredded coconut (up to ¼ cup). Avoid: Apples, applesauce, apricots, avocados, blackberries, boysenberries, and cherries. Also avoid dates, figs, grapefruit, guava, lychee, and mangoes. Don't eat nectarines, peaches, pears, persimmon, plums, prunes, tamarillo, or watermelon. And limit most canned and dried fruits. Oils, spices, condiments, and sweeteners  Okay to eat: Vegetable oils (including garlic infused), butter, ghee, lard, and margarine (no trans fat). You can have most fresh herbs like basil, chives, coriander, yee, parsley, rosemary and thyme. You can have salt, jams made from low-FODMAP fruits, mayonnaise, and mustard. Soy sauce, hot sauce (no garlic), tamari, and vinegar are also okay. Sweeteners that are okay include sugar (sucrose), powdered (confectioner's) sugar, brown sugar, glucose, and maple syrup. You can also have some artificial sweeteners like aspartame, saccharine, and stevia. Avoid: Chutneys, hummus, jellies, garlic sauces, and gravies made with onion or garlic. Avoid pickles, relish, some salad dressings and soup stocks, salsa, and tomato paste. And avoid sauces and other foods with high fructose corn syrup, honey, molasses, and agave. Avoid artificial sweeteners (isomalt, mannitol, malitol, sorbitol, and xylitol).  Avoid corn syrup solids, fructose, fruit juice concentrate, and polydextrose. Other foods and drinks  Okay to have: Water, soda water, tonic, soft drinks sweetened with sugar, ½ cup of low-FODMAP fruit juice, and most teas and alcohols. You can also eat foods made with baking powder and soda, cocoa, and gelatin. Avoid: Juices from high-FODMAP fruits and vegetables. And avoid fortified luzmaria, chamomile and fennel teas, chicory-based drinks and coffee substitutes, and bouillon cubes. Follow-up care is a key part of your treatment and safety. Be sure to make and go to all appointments, and call your doctor if you are having problems. It's also a good idea to know your test results and keep a list of the medicines you take. Where can you learn more? Go to http://www.gray.com/  Enter L235 in the search box to learn more about \"Learning About the Low FODMAP Diet for Irritable Bowel Syndrome (IBS). \"  Current as of: December 17, 2020               Content Version: 13.0  © 1593-0467 [employer], [employer]. Care instructions adapted under license by [employer] (which disclaims liability or warranty for this information). If you have questions about a medical condition or this instruction, always ask your healthcare professional. [address] any warranty or liability for your use of this information.

## 2022-03-02 NOTE — TELEPHONE ENCOUNTER
Olive Hodgkin is calling to see if pt's nausea rx has been called in to [facility] on [address]     CB# is [phone]

## 2022-03-02 NOTE — TELEPHONE ENCOUNTER
Returned call to EMILEE, [phone], patient ID verified X 2. Advised that patient has no one listed on her PHI and will need to call the office. Per EMILEE, she has MPOA - advised that this does not pertain until patient cannot speak for herself. Understanding verbalized, will encourage patient to call, states patient is in need of refill for Lomotil. Understands that NP will be advised, would like [facility]. Update to Provider.

## 2022-03-03 NOTE — TELEPHONE ENCOUNTER
Pt needs a vv with Dr Alton Loredo in March and Dr Alton Loredo is booked. Please advise.     CB# is [phone]

## 2022-03-03 NOTE — TELEPHONE ENCOUNTER
The patient is calling to have Xanax called in from Dr. Yvette Shea. It has Dr. Homero Garza name on it now. She also needs something called in for nausea.  # [id number].

## 2022-03-03 NOTE — TELEPHONE ENCOUNTER
Triage for Controlled Substance Refill Request    Pain Diagnosis: _Anxiety (F41.9)    Last Outpatient Visit: _2/28/2022    Next Outpatient Visit: _none    Reason for refill needed outside of office visit? Appointment not scheduled prior to need for scheduled refill      Pharmacy: _[facility] - [address]    Medication:ALPRAZolam (XANAX) 1 mg tablet      Dose and directions: Take 1 Tablet by mouth two (2) times daily as needed for Anxiety. Max Daily Amount: 2 mg.   Number dispensed:60  Date filled ( or Pharmacy):1/28/2022  # left:       reviewed: _Yes    Date of Urine Drug Screen:  _    Opioid Safety Handout given:  _yes    Appropriate for refill:  _yes    Action:  _ Medication pending

## 2022-03-06 NOTE — HOSPICE
Met with Rylie Albert, several friends, her decision maker Gibson Spaulding (she is a hospice SW). Pt is alert and oriented x4. Discussed events of last few days. Pt was very dehydrated from N/V. Had IV infusion with [facility]. Today she is feeling better, taking sips po, has Zofran on hand. She has long acting and breakthrough opioids in the home. She realizes she feels better due to IV fluids, and is realistic about disease process. Rylie Albert would like to admit to hospice but is not ready until some financial details are clear. She has been on Long term disability and those benefits are ending. She needs to know if she can be covered under COBRA. Friends have helped her with SSI benefits (pending). We discussed go care available under [facility]. We also discussed levels of care including respite and GIP. Pt will call us when she is ready to admit pending clarification of financial items. Plan- family contact Grace Caicedo would appreciate a call from our  to discuss go care, financial aid.

## 2022-03-10 NOTE — PROGRESS NOTES
[facility]  Oncology Social Work  Encounter     Patient Name:  Robin Land     Medical History: Metastatic cancer likely lung primary. Stage IV with brain metastases. Advance Directives: Patient has an AMD on file. Narrative:  Supportive call made to the patient today. She shared that she has been having GI distress, but she feels that this may be related to constant calls from concerned family members and friends. Spoke with the patient about setting appropriate boundaries with them, in order to find moments of peace. She recently spoke with hospice, and was told that she qualifies for weekly visits. However, she is in transition with her insurance, as [employer] let her go as of March 2022, since she is not physically able to return post short term disability. She therefore no longer has long term disability benefits available. She is concerned about insurance coverage. Encouraged her to speak with HR, to glean next steps regarding COBRA. She called [employer], and was informed that [employer] has 30 days to supply her information to [employer], and once this is done, [employer] will send her the [facility] enrollment paperwork for completion. She spoke with her  and , and has transferred her [facility] residential funds to her personal residential account. She applied for Social Security Disability in January 2022. Provided the link to check the status of her Social Security Application. [email or web address]. The patient stated \"I didn't qualify for SSI. \"  Explained that she has paid into the Social Security sytem since she was a teenager, so she qualifies for [employer] in lieu of [address]. Offered active listening and emotional support to the patient today and as needed in the future. Barriers to Care:   No barriers to care identified at this time. Plan:  1. Continue to meet with the patient when she returns to the clinic for ongoing assessment of the patients adjustment to her diagnosis and treatment. 2.  Ongoing psychosocial support as desired by patient. Referral/Handouts:   No referrals placed at this time.    SRINIVASAN DesirW

## 2022-03-10 NOTE — PROGRESS NOTES
MSW ECM attempted to contact patient. No answer. Discreet VM left. MSW ECM will call back if call is not returned.

## 2022-03-10 NOTE — PROGRESS NOTES
[facility] [place] at [address], [address] [address], [place]: [phone]  F: [phone]      Reason for Visit:   Laura  is a 63 y.o. female who is seen by synchronous (real-time) audio-video technology for follow up of Metastatic Cancer likely Lung Primary    Treatment History:   · CT Chest 7/23/21: Spiculated right upper lobe mass with positive right hilar and mediastinal adenopathy. Probable hepatic and possible left adrenal metastases. Recommend PET/CT  · CT Chest 7/30/21: Right upper lobe pulmonary nodule with mediastinal and right hilar lymphadenopathy, highly suspicious for primary pulmonary neoplasm. 2 mm right middle lobe pulmonary nodule. Suspicious masses in the right adrenal gland and liver  · Brain MRI 8/3/21: 2.5 cm left temporal lobe mass with subacute hemorrhage. Considering patient's history this is most likely metastasis. Mild nonspecific white matter T2 hyperintensity may be related to mild chronic small vessel ischemic change  · CT Head 8/11/21: 2.5 cm left temporal lobe enhancing mass  · 8/11/21 PROCEDURE PERFORMED:  BrainLAB-guided stereotactic craniotomy resection of left temporal hemorrhagic metastatic brain tumor, use of operating microscope and stereotaxy  · CT Head 8/12/21: Recent left temporoparietal craniotomy with tumor resection. No operative complications  · PET 6/83/30: Hypermetabolic mass lesion right upper lobe with hypermetabolic mediastinal and right hilar lymphadenopathy. Hypermetabolic right adrenal mass is concerning for metastatic disease  · Mammogram 8/27/21: No mammographic evidence of malignancy  · Gamma Knife with Dr. Fernand Favre 9/9/21  · MRI C Spine 9/12/21: C4 vertebral tumor/metastasis extending into the right foramen, without spinal stenosis. Multilevel cervical degenerative disc disease.  No fracture or subluxation  · Pembro 9/16/21 - Current  · XRT to C4 done 10/21/21  · MRI Brain 12/1/21: Postsurgical changes related to left posterior lateral temporal lobe mass resection, with minimal curvilinear enhancement but no definite residual or recurrent tumor. No new metastases  · MRI Cervical Spine 12/1/21: Decreased marrow replacement within the C4 vertebral body however there is persistent enhancing tissue within the right foramen and involvement of the right C4-5 facet compatible with extraosseous tumor spread. There is no new epidural component or new cord compression. No new metastatic disease to the cervical spine    STAGE: 4 with brain mets    History of Present Illness:   Myla Elliott is a 63 y.o. 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 Off keytruda since 12/21. Still has diarrhea. Still does not feel well overall. Has had poor appetite. Feels nausea. Could not take steroids as ordered. Made her ADD x 10. Talked with lauren here. States she needs her xanax. Has been on this for many years. Pt feels stressed. [email or web address] they came and gave IVF. Having insurance/ disability. Has no insurance. Pt states she had a hospice visit already. Does not want more systemic therapy. No fevers/ chills/ chest pain/ SOB/ nausea/ vomiting/diarrhea/ pain/fatigue            Last visit:   CTs with ? Progression vs immunotherapy reaction. Had steroids due to possible immunotherapy reaction. Pt adjusted steroid dose due to side effects. Pt is feeling better overall. Seeing palliative care for meds. Diarrhea better. Breathing ok. Hold immunotherapy today.    No fevers/ chills/ chest pain/ SOB/ nausea/ vomiting/        Past Medical History:   Diagnosis Date    Cancer ([address] 2021    LUNGS AND BRAIN AND LYMPH NODES    Hypercholesterolemia     Metastatic carcinoma ([address] 8/19/2021      Past Surgical History:   Procedure Laterality Date    HX BREAST AUGMENTATION      HX HERNIA REPAIR      IMPLANT BREAST SILICONE/EQ Bilateral 1025    sub-pectoral Social History     Tobacco Use    Smoking status: Current Every Day Smoker     Packs/day: 0.75     Years: 40.00     Pack years: 30.00    Smokeless tobacco: Never Used   Substance Use Topics    Alcohol use: Yes     Alcohol/week: 3.0 standard drinks     Types: 3 Cans of beer per week     Comment: socially      Family History   Problem Relation Age of Onset    Depression Mother     Obesity Mother     Heart Disease Mother     Mult Sclerosis Sister         END STAGE    Cancer Brother         TONSIL, WITH METS TO LIVER AND BONES    Heart Disease Maternal Grandfather     Cancer Paternal Grandmother     Anesth Problems Neg Hx      Current Outpatient Medications   Medication Sig    ondansetron (ZOFRAN ODT) 4 mg disintegrating tablet Take 1-2 Tablets by mouth every eight (8) hours as needed for Nausea or Vomiting.  ALPRAZolam (XANAX) 1 mg tablet Take 1 Tablet by mouth two (2) times daily as needed for Anxiety. Max Daily Amount: 2 mg. TAKE 1 TABLET BY MOUTH EVERY 4 HOURS AS NEEDED FOR ANXIETY    diphenoxylate-atropine (LOMOTIL) 2.5-0.025 mg per tablet TAKE 2 TABLETS BY MOUTH TWO (2) TIMES DAILY AS NEEDED FOR DIARRHEA. MAX DAILY AMOUNT: 4 TABLETS.  zolpidem CR (AMBIEN CR) 12.5 mg tablet Take 1 Tablet by mouth nightly as needed for Sleep. Max Daily Amount: 12.5 mg.    morphine CR (MS CONTIN) 30 mg CR tablet Take 1 Tablet by mouth every twelve (12) hours for 30 days. Max Daily Amount: 60 mg.    oxyCODONE IR (ROXICODONE) 5 mg immediate release tablet Take 2 Tablets by mouth every four (4) hours as needed for Pain for up to 10 days. Max Daily Amount: 60 mg.    dicyclomine (BENTYL) 10 mg capsule Take 1 Capsule by mouth four (4) times daily as needed for Abdominal Cramps.  predniSONE (DELTASONE) 20 mg tablet Take 60 mg by mouth daily (with breakfast).  (Patient not taking: Reported on 1/31/2022)    QUEtiapine (SEROquel) 25 mg tablet Take 12.5-mg at bedtime as needed for sleep (Patient not taking: Reported on 1/31/2022)    levETIRAcetam (KEPPRA) 500 mg tablet TAKE ONE TABLET BY MOUTH TWO TIMES A DAY    albuterol (PROVENTIL HFA, VENTOLIN HFA, PROAIR HFA) 90 mcg/actuation inhaler Take 2 Puffs by inhalation every four (4) hours as needed for Wheezing.  ferrous sulfate (IRON PO) Take  by mouth.  MULTIVITAMIN PO Take  by mouth.  sertraline (ZOLOFT) 50 mg tablet Take 1 Tablet by mouth daily.  naloxone (Narcan) 4 mg/actuation nasal spray Use 1 spray intranasally, then discard. Repeat with new spray every 2 min as needed for opioid overdose symptoms, alternating nostrils.  polyethylene glycol (Miralax) 17 gram/dose powder Take 17 g by mouth as needed for Constipation. (Patient not taking: Reported on 2/28/2022)    simethicone (GAS-X) 125 mg capsule Take 125 mg by mouth daily as needed for Flatulence.  dexAMETHasone (DECADRON) 4 mg tablet Take 1 tab PO every 12 hours tonight then 1/2 a tab PO every 12 hours x 2 days then 1/2 a tab daily x 2 days then stop. (Patient not taking: Reported on 9/27/2021)    acetaminophen (TYLENOL) 325 mg tablet Take 2 Tablets by mouth every six (6) hours as needed for Pain.  tiotropium (Spiriva with HandiHaler) 18 mcg inhalation capsule Take 1 Capsule by inhalation daily.  medroxyPROGESTERone (Provera) 5 mg tablet Take 5 mg by mouth daily.  varenicline (Chantix) 1 mg tablet Take 1 mg by mouth two (2) times daily (after meals). (Patient not taking: Reported on 9/27/2021)    estradioL (Estrace) 0.5 mg tablet Take 0.5 mg by mouth daily.  rosuvastatin (CRESTOR) 10 mg tablet TAKE 1 TABLET BY MOUTH EVERY DAY     No current facility-administered medications for this visit. No Known Allergies     Review of Systems:  A complete review of systems was obtained, negative except as described above and as reported on ROS sheet scanned into system. Physical Exam:     There were no vitals taken for this visit.     General: alert, cooperative, no distress   Mental  status: normal mood, behavior, speech, dress, motor activity, and thought processes, able to follow commands   HENT: NCAT   Neck: no visualized mass   Resp: no respiratory distress   Neuro: no gross deficits   Skin: no discoloration or lesions of concern on visible areas   Psychiatric: normal affect, consistent with stated mood, no evidence of hallucinations       Due to this being a TeleHealth evaluation (During FUWWT-76 public health emergency), many elements of the physical examination are unable to be assessed. Evaluation of the following organ systems was limited: Vitals/Constitutional/EENT/Resp/CV/GI//MS/Neuro/Skin/Heme-Lymph-Imm. Results:     Lab Results   Component Value Date/Time    WBC 19.6 (H) 02/28/2022 02:20 PM    HGB 11.8 02/28/2022 02:20 PM    HCT 37.0 02/28/2022 02:20 PM    PLATELET 744 (H) 61/45/7046 02:20 PM    MCV 80.3 02/28/2022 02:20 PM    ABS. NEUTROPHILS 15.1 (H) 02/28/2022 02:20 PM     Lab Results   Component Value Date/Time    Sodium 132 (L) 02/28/2022 02:20 PM    Potassium 4.5 02/28/2022 02:20 PM    Chloride 101 02/28/2022 02:20 PM    CO2 28 02/28/2022 02:20 PM    Glucose 98 02/28/2022 02:20 PM    BUN 7 02/28/2022 02:20 PM    Creatinine 0.68 02/28/2022 02:20 PM    GFR est AA >60 02/28/2022 02:20 PM    GFR est non-AA >60 02/28/2022 02:20 PM    Calcium 9.8 02/28/2022 02:20 PM    Glucose (POC) 109 08/16/2021 08:39 AM     Lab Results   Component Value Date/Time    Bilirubin, total 0.2 02/28/2022 02:20 PM    ALT (SGPT) 12 02/28/2022 02:20 PM    Alk. phosphatase 154 (H) 02/28/2022 02:20 PM    Protein, total 6.9 02/28/2022 02:20 PM    Albumin 2.9 (L) 02/28/2022 02:20 PM    Globulin 4.0 02/28/2022 02:20 PM     CT Chest 7/23/21  IMPRESSION:  Spiculated right upper lobe mass with positive right hilar and mediastinal  adenopathy. Probable hepatic and possible left adrenal metastases. Recommend  PET/CT.     CT Chest 7/30/21  IMPRESSION:  Right upper lobe pulmonary nodule with mediastinal and right hilar  lymphadenopathy, highly suspicious for primary pulmonary neoplasm. 2 mm right  middle lobe pulmonary nodule. Suspicious masses in the right adrenal gland and  liver. Brain MRI 8/3/21  IMPRESSION:  1. 2.5 cm left temporal lobe mass with subacute hemorrhage. Considering  patient's history this is most likely metastasis. 2. Mild nonspecific white matter T2 hyperintensity may be related to mild  chronic small vessel ischemic change. CT Head 8/11/21  IMPRESSION:  2.5 cm left temporal lobe enhancing mass. 8/11/21  FINAL PATHOLOGIC DIAGNOSIS:   1.  Brain, left temporal tumor, biopsy:        Metastatic poorly differentiated carcinoma   See comment   2.  Brain, left temporal tumor, excision:        Metastatic poorly differentiated carcinoma     Comment   Immunohistochemical stains reveal reactivity for CK7 and NATHANIEL-3, with   patchy/focal p40 staining. ER, IL, HER2, TTF-1, MART-1, SOX-10, CK20, and   CDX-2 stains are negative in the malignant cells.     The patient's recent lung/mediastinal lymph node biopsies are reviewed and   the current tumor shares some morphologic characteristics with the   malignant cells in the 4R lymph node cell block ([id number]), however   evaluation is limited by extensive necrosis. A subset of pulmonary   adenocarcinomas show NATHANIEL-3 expression (approximately 12%), therefore   metastatic carcinoma from a lung primary is still a possibility, however   NATHANIEL-3 expression is much more commonly seen in breast and urothelial   primaries, which should be reasonably excluded clinically. Note is made that the patient's recent lung biopsy was sent for molecular   testing which is pending, therefore additional testing is not requested on   this specimen. If needed, tumor material is available in block 2A of the   current specimen for ancillary testing. These findings were discussed with Dr. Renny Barrett via [employer]   at 2:30 p.m. on 8/13/2021.      CT Head 8/12/21  IMPRESSION:  Recent left temporoparietal craniotomy with tumor resection. No  operative complications    PET Results (most recent):  Results from Hospital Encounter encounter on 08/16/21    PET/CT TUMOR IMAGE SKULL THIGH W (INI)    Addendum 8/24/2021  2:23 PM  Addendum: There is a focus of increased tracer activity corresponding to a 5 mm  lytic lesion in the C4 vertebral body, compatible with metastatic disease. Narrative  PET/CT SCAN    PROCEDURE: Following IV injection of 9.98 mCi 18 Fluoro 2 deoxyglucose (FDG) and  a standard uptake delay, PET imaging is performed from the skull vertex to mid  thigh and axial, sagittal and coronal images were acquired. Unenhanced CT is  obtained for anatomic localization, and attenuation correction of the PET scan. Patient preprocedure blood glucose level: 109mg/dL. CORRELATIVE IMAGING STUDIES: Chest CT 7/30/2021. COMPARISON PET: None    HISTORY: The study is requested for staging metastatic poorly differentiated  carcinoma. Biopsy confirmed diagnosis. FINDINGS:    HEAD/NECK: Photopenia corresponds to the left frontal postoperative changes. CHEST: Increased tracer activity corresponds to the lesion in the right upper  lobe, maximum SUV is 7.3. Additionally, there is hypermetabolic right  paratracheal, precarinal, and right hilar lymphadenopathy. ABDOMEN/PELVIS: Hypermetabolic right adrenal mass, maximum SUV is 9.3. There is  no abnormal uptake to correspond to the small hypodense lesion in the liver. SKELETON: No foci of abnormal hypermetabolism in the axial and visualized  appendicular skeleton. Impression  Hypermetabolic mass lesion right upper lobe with hypermetabolic  mediastinal and right hilar lymphadenopathy. Hypermetabolic right adrenal mass  is concerning for metastatic disease.     [facility] Results (most recent):  Results from [place] encounter on 08/27/21    SOO 3D HEBER W MAMMO BI SCREENING INCL CAD    Narrative  STUDY: Bilateral digital screening mammogram with 3-D tomosynthesis    INDICATION:  Screening. COMPARISON: 2013    BREAST COMPOSITION: There are scattered areas of fibroglandular density. FINDINGS: Bilateral digital screening mammography was performed and is  interpreted in conjunction with a computer assisted detection (CAD) system. Additionally, tomosynthesis of both breasts in the CC and MLO projections was  performed. Bilateral implant displaced views are performed. Bilateral  submuscular implants are stable. No suspicious masses or calcifications are  identified. There has been no significant change. Impression  BI-RADS 2: Benign. No mammographic evidence of malignancy. RECOMMENDATIONS:  Next screening mammogram is recommended in one year. The patient will be notified of these results. MRI Cervical Spine 9/12/21  IMPRESSION:  1. C4 vertebral tumor/metastasis extending into the right foramen, without  spinal stenosis. 2. Multilevel cervical degenerative disc disease. 3. No fracture or subluxation. MRI Results (most recent):  Results from [place] encounter on 12/01/21    MRI CERV SPINE W WO CONT    Narrative  EXAM: MRI CERV SPINE W WO CONT    INDICATION: . Secondary malignant neoplasm of bone    Exam:  MRI cervical spine. Comparisons:  8/16/2021, 9/21/2021    Sequences:  Sagittal T1, T2, and STIR. Axial T1, T2. After the intravenous  administration of Field 1  mL of Dotarem sagittal and axial T1-weighted images  were obtained . FINDINGS:  Alignment is normal.  Again demonstrated is abnormal marrow signal  within the C4 vertebral body. This has decreased in size now involving the  posterior half of the vertebral body. There are areas of cortical bone loss. Again demonstrated is extension of tumor into the right C4-C5 foramen and  involvement of the right C4-C5 facets. Overall marrow signal is decreased but no  other areas of definite tumor involvement are demonstrated.  There is multilevel  endplate degenerative change. The visualized posterior fossa and cord signal  are normal.  Paraspinous soft tissues are within normal limits. There is mild multilevel degenerative change with disc height loss and spurring  which has not progressed in the interval.    Impression  1. Decreased marrow replacement within the C4 vertebral body however there is  persistent enhancing tissue within the right foramen and involvement of the  right C4-5 facet compatible with extraosseous tumor spread. There is no new  epidural component or new cord compression  2. No new metastatic disease to the cervical spine    Brain MRI 12/1/21  IMPRESSION:  Postsurgical changes related to left posterior lateral temporal lobe mass  resection, with minimal curvilinear enhancement but no definite residual or  recurrent tumor. No new metastases    CT Results (most recent):  Results from Hospital Encounter encounter on 02/23/22    CT CHEST W CONT    Narrative  EXAM: CT CHEST W CONT, CT ABD PELV W CONT    INDICATION: Dx: Metastatic non-small cell lung cancer ([facility] [address] [facility] [C34.90  (ICD-10-CM)]; Smoker [L40.520 (ICD-10-CM)]; Abnormal CT of the chest [R93.89  (ICD-10-CM)]    COMPARISON: CT chest abdomen pelvis 1/17/2022. TECHNIQUE: Helical CT of the chest, abdomen, and pelvis following the uneventful  intravenous administration of 100 mL Isovue-370. Oral contrast was utilized. Coronal and sagittal reformats were generated. CT dose reduction was achieved  through use of a standardized protocol tailored for this examination and  automatic exposure control for dose modulation. FINDINGS:    THYROID: No nodule. CHEST WALL: Bilateral mastectomies with breast implants. MEDIASTINUM: Stable 11 mm short axis low right paratracheal lymph node  GIOVANI: Stable 15 mm short axis right hilar lymph node  THORACIC AORTA: No aneurysm. MAIN PULMONARY ARTERY: Normal in caliber. HEART: Normal in size.  Slightly increasing small to moderate pericardial  effusion  ESOPHAGUS: No wall thickening or dilatation. TRACHEA/BRONCHI: Patent. PLEURA: No effusion or pneumothorax. LUNGS: Interval slight enlargement of 2.8 cm x 2.2 cm right upper lobe nodule  (previously 2.2 cm x 2.1 cm). Interval slight enlargement of 9 mm lobulated  right upper lobe nodule (4-13), previously 6 mm). Stable 4 mm right upper lobe  nodule (4-12)    Liver: Stable subcentimeter hypodensity in the medial left hepatic lobe, too  small to accurately characterize (2-68). Biliary tree: The gallbladder is within normal limits. The CBD is not dilated. Spleen: Within normal limits. Pancreas: No inflammation, mass or ductal dilatation. Adrenals: Slightly enlarging necrotic 6.6 cm by 4.3 cm x 3.2 cm right adrenal  lesion (previously 6.1 cm by 3.5 cm x 3.0 cm, when remeasured)  Kidneys: No mass or hydronephrosis. Stable 13 mm left lower pole cyst, no  dedicated follow-up recommended. Stomach: Unremarkable. Small bowel: No dilatation or wall thickening. Colon: No dilatation or wall thickening. Appendix: Not identified  Peritoneum: Trace ascites. No pneumoperitoneum. Retroperitoneum: Several stable lymph nodes in the retroperitoneum measuring up  to 11 mm short axis (2-63). No aortic aneurysm. Atherosclerosis. Reproductive organs: Uterus and adnexa are within normal limits. Urinary bladder: No mass or calculus. Bones: No destructive bone lesion. Degenerative changes  Abdominal wall: No mass or hernia. Additional comments: N/A    Impression  1. Interval slight increase in pulmonary nodule burden, including slight  increase in size of a 2.8 cm x 2.2 cm dominant right upper lobe nodule. 2.  Interval increase in size of necrotic right adrenal lesion. 3.  Stable mild mediastinal, right hilar, and upper retroperitoneal  lymphadenopathy. 4.  Slightly increasing small to moderate pericardial effusion. Records reviewed and summarized above.   Pathology report(s) reviewed above. Radiology report(s) reviewed above. Assessment/PLAN:     1)  Stage 4 PD Carcinoma Nonspecific Path likely Lung Primary with Brain/Bone Mets    Spiculated RUL lung mass on CT 7/21  Post neurosurgery 8/11/21 and path likely lung could be breast  Since likely lung and PDL1 90% and can do immunotherapy alone. Prior molecular testing negative except for PD-L1 90%. PET +in lung and mediastinal LNs/possible adrenal met. She had Gamma Knife with Dr. Venkat Martin on 9/9/21. She had a mammogram that was negative. Patient started Zaria Specking on 9/16/21. Seen today for fu. No keytruda since 12/21. Pt still having diarrhea and unsure if this is related to [place] since this should be gone. Pt thinks may be related to anxiety. On chronic long term xanax. Seeing palliative care/ has seen hospice. Had CTs done 2/23/22 and slight disease progression on CTs  Reviewed with pt today. Pt does not want any further systemic therapy/ treatment for her cancer. We are fine with hospice. Pt has many questions about hospice and will talk with SW from here for support. Clinically pt is weak and tired and has lost weight. Fu with us prn. 2) Brain Metastases   Post neurosurgery. She had Gamma Knife with Dr. Venkat Martin on 9/9/21. Brain MRI 12/1/21 was stable. 3) C-Spine Bon Metastases   Completed XRT on 10/21/21. 4) Cancer Related Pain    per Palliative Care. Pain is controlled per patient. 5) Smoker. Cut back. 6) Chronic Anxiety   Xanax PRN  / was getting from PCP/ now palliative care. 7) Occasional Headaches  Controlled with pain medications. 9) Psychosocial  Mood with anxiety, coping well with family support. 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 She is  with kids/grandkids, was in [employer]. SW/NN support as needed. Call if questions. Follow up in 4 weeds virtual if needed    The patient was evaluated through a synchronous (real-time) audio-video encounter. The patient (or guardian if applicable) is aware that this is a billable service, which includes applicable co-pays. This Virtual Visit was conducted with patient's (and/or legal guardian's) consent. The visit was conducted pursuant to the emergency declaration under the [address] [place] [address], [address] [place] [address] authority and the [employer] and [employer] General Act. Patient identification was verified, and a caregiver was present when appropriate. The patient was located in a state where the provider was licensed to provide care. I appreciate the opportunity to participate in Ms. Juan Fonseca's care.     Signed By: Madhuri Olmstead DO

## 2022-03-10 NOTE — PROGRESS NOTES
Patient referred by Dennise Hood, [employer] [facility]. Patient has metastatic non small cell lung cancer. She is a patient of Dr. Bart Solis, Oncology and is also followed by Dr. Marybel Harrison. Patient was referred to hospice and has been told she qualifies for weekly visits (3/6/22). Patient has not agreed to this just yet. Outreach to patient, verified two patient identifiers. Patient is at peace with her diagnosis. She feels her family and friends are contacting her too much, which is causing her stress, which makes her feel nauseated. [facility] recently provided IVF for patient and she felt better. She has zofran ordered for nausea. Takes lomotil and bentyl for diarrhea and abdominal cramping. Plans to talk to her family and ask them to stop calling so much. Provided support and encouragement. Patient states her O2 levels are 95-98%, has her own pulse ox. No medical needs at this time. Patient is attempting to get her financial affairs in order at this time. She has been terminated by [employer] as of February, with LTD being cancelled. She is concerned about getting COBRA. Has called [place] [facility] and was told that BS has 30 days to send her name to them, once this is done, [facility] will send her the paperwork to enroll in COBRA. She has transferred her [employer] funds to her personal [employer] account. She states that she has been in touch with [employer] and was denied because they said she was worth too much. Had her house valued at [address] but only worth 300K, along with other discrepancies, which she has corrected and is awaiting final decision. Doesn't feel she would qualify for any assistance, as she has 600K in [employer] funds. Patient agreed to have MARILYN Juan for [employer], contact her for possible assistance. Will follow up in one week, 3/18.

## 2022-03-14 NOTE — PROGRESS NOTES
[facility] attempted to contact patient for follow-up. No answer. VM left. [facility] will attempt to contact patient next week if call is not returned.

## 2022-03-15 NOTE — TELEPHONE ENCOUNTER
Per Dr. Tay Jiang, Medication pended for refill:    Xanax 1mg 1 tab twice daily #60 for 30 days    Zoloft 50mg 1 tab daily #30 with 2 refills for depression/anxiety    Ambien CR 12.5mg 1 tab at bedtime #30 for 30 day supply.     Indio Sorto RN  Palliative Medicine

## 2022-03-15 NOTE — TELEPHONE ENCOUNTER
Palliative Medicine  Nursing Note  [phone]  Fax [phone]      Telephone Call  Patient Name: Augusto Newman  YOB: 1960    3/15/2022        Primary Decision Maker (Active): Liliana Quiroz - [phone]   Advance Care Planning 2/28/2022   Patient's Healthcare Decision Maker is: Named in scanned ACP document   Confirm Advance Directive Yes, on file   Patient Would Like to Complete Advance Directive -   Does the patient have other document types -       Call returned to Ms. Fonseca. She states that she has a \"blow out\" of watery stool and mucous every morning at 3am.  She has diarrhea without mucous and in lesser quantity in the afternoon. She states that if she takes her iron pill her stool will be green/black in color, but light brown without the pill. She suffers from bloating and cramping prior to the diarrhea. Her last formed stool was weeks ago. She is taking Lomotil 1 tab twice daily. She takes 2 tabs twice daily if she has more frequent episodes. She is wondering is Dr. Dinh Cesar thinks a low dose prednisone would be beneficial to stop the diarrhea and reduce the inflammation. She stated that is what occurred when she took Prednisone last time, and she liked that. She is requesting a refill on the following medications:    She is taking 1-2 tabs of Ambien CR 12.5mg at night to help her sleep. The 2nd tab is after she has been asleep for several hours, but wakes up with the diarrhea and has a hard time getting back to sleep. She has 5 tabs left. She needs a refill on her Zoloft 50mg. She still is quite anxious and wonders if the dose should be increased. She requests a refill on Xanax 1mg as the pharmacy only provided 20 tabs on 3/5/22 by a Dr. Emeka Boateng on-call. She has 2 left. Pharmacy states they never received a Xanax 1mg #60 for 30 days on 3/3/22 by Dr. Chau Shore in Palliative Medicine. Needs refill on Lomotil. She has 2 tabs left.     Discussed that Dr. Mechelle Woodruff will be made aware and Palliative will call for any changes. Call placed to Ms. Fonseca and informed that Dr. Mechelle Woodruff prescribed the Zoloft, Xanax, and Ambien. The question regarding Prednisone and the Lomotil she directed to Oncology. She verbalized understanding and was appreciative.       Elisa Roberson RN  Palliative Medicine

## 2022-03-15 NOTE — TELEPHONE ENCOUNTER
Patient calling for the following refills:    Xanax - \[facility]\"" pharmacy provided her with 20 pills and she only has 6 left  Ambien - has 5 left    Also wants to know if prednisone would work to help with the diarrhea. Wants to know what she can do to help it.

## 2022-03-16 NOTE — TELEPHONE ENCOUNTER
The patient is calling to f/u on status of refills. Adv pending. She would like to pick them up today.

## 2022-03-17 NOTE — TELEPHONE ENCOUNTER
Palliative Medicine  Nursing Note  [phone]  Fax [phone]      Telephone Call  Patient Name: Alberto Snyderite  YOB: 1960    3/17/2022        Primary Decision Maker (Active): Ion De Jesus - [phone]   Advance Care Planning 2/28/2022   Patient's Healthcare Decision Maker is: Named in scanned ACP document   Confirm Advance Directive Yes, on file   Patient Would Like to Complete Advance Directive -   Does the patient have other document types -       Call placed to pharmacy and corrected prescription for Xanax to read 1mg twice daily prn anxiety # 60 for 30 day supply. Call placed to Ms. Fonscea and informed of the above. She was appreciative.     Jeimy Duran, RN  Palliative Medicine

## 2022-03-17 NOTE — PROGRESS NOTES
Follow up phone call to patient, two pt identifiers verified. Patient states her pain is manageable. Stomach issues-diarrhea, plans to call her provider to ask for a small dose of prednisone. Hoping this may help. Has gotten her cremation plans taken care of. Currently getting paperwork together for [employer] and [employer] Disability. She has friends coming over today to help her get paperwork filled out. She stated that she will call me tomorrow to let me know if she needs further assistance and will ask Drea to call if she does. Discussed nutrition. States she wants to have a beer, drank a Dr. Cooley Braver yesterday and says she paid for it last night. She has ordered some high calorie shakes to help her maintain her weight. States she plans to create a group text for family members who keep calling. She feels frustrated that they call and wake her up once she falls asleep. Patient has plenty of support with family and friends. Patient's primary care provider relationship reviewed with patient and modified, as applicable.     Readiness to Change: []  Pre-contemplative    []  Contemplative  []  Preparation               [x]  Action                  []  Maintenance    Barriers/Challenges to Care: []  Decline in memory    []  Language barrier     []  Emotional                  []  Limited mobility  []  Lack of motivation     [] Vision, hearing or cognitive impairment []  Knowledge [] Financial Barriers []  Lack of support  []  Pain []  Other [x]  None    Key pt activities to achieve better health:   []  Weight loss  []  Improved diabetic control  []  Decreased cholesterol levels  []  Decreased blood pressure  [x]  Patient is using supplemental shakes to maintain weight  []    Upcoming appointments:   Future Appointments   Date Time Provider Lakeshia Valencia   3/28/2022 11:30 AM Nazario Rosa MD [facility]   4/11/2022  3:30 PM Shaneka Sumner,  [address] [facility] AMB     Plan for next call: Call in two weeks, 4/7.

## 2022-03-17 NOTE — TELEPHONE ENCOUNTER
The patient called the pharmacy to f/u on script for Xanax. She was advised they need the doctor to call and provide instructions on how to take it.  Her cb # is [phone]

## 2022-03-28 NOTE — PATIENT INSTRUCTIONS
Dear Myla Elliott ,    It was a pleasure seeing you today via televisit. We will see you again in 4 weeks for a televisit. If labs or imaging tests have been ordered for you today, please call the office  at [phone] 48 hours after completion to obtain the results. Your described symptoms were: Fatigue: 10 Drowsiness: 0   Depression: 10 Pain: 0   Anxiety: 10 Nausea: 3   Anorexia: 6 Dyspnea: 7 (with activity )   Best Well-Being: 10 Constipation: No   Other Problem (Comment): 10 (Muscle fatigue )       This is the plan we talked about:      1. Abdominal pain/gas/bloating/diarrhea/constipation   -You've had life-long issues with constipation and diarrhea consistent with irritable bowel syndrome  -Start taking dicyclomine (bentyl) 20-mg four times daily on a regular basis  -Continue Gas-x as needed  -Limit foods to those on the FODMAP diet (see below)    2. Back and neck pain  -You've been having more pain in your right back \"where the kidney and adrenal gland\" are located  -Continue extended-release morphine to 30-mg every 12 hours  -Continue oxycodone 5-mg to 2 tabs every 4 hours as needed  -Continue tylenol 500-mg: take 2 tabs every 6 hours as needed    3. Fatigue/poor sleep  -Continue Ambien CR 12.5-mg at bedtime as needed  -Do not take Xanax with Ambien    4. Anxiety and depression  -You're feeling overwhelmed with family and friends reaching out and coming over to your home  -Today we discussed strategies to manage this  -Continue Zoloft 50-mg daily  -Continue Xanax 1-mg twice daily as needed    5. Lung cancer  -You completed radiation therapy to your neck on 10/19 and 10/21/21  -Your most recent scans in late February showed slight disease progression on Keytruda  -You've decided not to continue with cancer therapy  -You've met with hospice and you plan on receiving hospice services at some point soon  -You meet with Dr. Staci Chiu again next week    6.  Advance Care Planning/Goals-of-Care (we talked about this at your initial visit)  -Quality of life is more important to you than quantity of life and you make your health care decisions guided by this value  -You've completed your Advance Medical Directives, naming your friend, Eric Morgan, who is a hospice nurse, as your primary medical decision-maker      This is what you have shared with us about Advance Care Planning:      Primary Decision Maker (Active): Víctor Montoya - [phone]  Advance Care Planning 3/28/2022   Patient's [address] is: Named in scanned ACP document   Confirm Advance Directive Yes, on file   Patient Would Like to Complete Advance Directive -   Does the patient have other document types -           The Palliative Medicine Team is here to support you and your family.        Sincerely,      Mauro Neely MD and the Palliative Medicine Team

## 2022-03-28 NOTE — PROGRESS NOTES
Palliative Medicine Office Visit  Palliative Medicine Nurse Check In  [phone]    Patient Name: Gabe Moon  YOB: 1960      Date of Office Visit: 3/28/2022    Patient states: \"  \"    From Check In Sheet (scanned in Media):  Has a medical provider talked with you about cardiopulmonary resuscitation (CPR)? [x] Yes   [] No   [] Unable to obtain    Nurse reminder to complete or update ACP FlowSheet:    Is ACP on the Problem List?    [x] Yes    [] No  IF ACP Document is ON FILE; Nurse to place ACP on Problem List     Is there an ACP Note in Chart Review/Note? [] Yes    [x] No   If NO: ALERT PROVIDER       Primary Decision Maker (Active): Adrien Pedroza  [phone]  Advance Care Planning 3/28/2022   Patient's Healthcare Decision Maker is: Named in scanned ACP document   Confirm Advance Directive Yes, on file   Patient Would Like to Complete Advance Directive -   Does the patient have other document types -       Is there anything that we should know about you as a person in order to provide you the best care possible? Have you been to the ER, urgent care clinic since your last visit? [] Yes   [x] No   [] Unable to obtain    Have you been hospitalized since your last visit? [] Yes   [x] No   [] Unable to obtain    Have you seen or consulted any other health care providers outside of the [address] [place] [address] since your last visit?    [] Yes   [x] No   [] Unable to obtain    Functional status (describe):         Last BM: 3/28/2022     accessed (date): 3/28/2022    Bottle review (for opioid pain medication):  Medication 1:   Date filled:   Directions:   # filled:   # left:   # pills taking per day:  Last dose taken:    Medication 2:   Date filled:   Directions:   # filled:   # left:   # pills taking per day:  Last dose taken:    Medication 3:   Date filled:   Directions:   # filled:   # left:   # pills taking per day:  Last dose taken:    Medication 4:   Date filled:   Directions:   # filled:   # left:   # pills taking per day:  Last dose taken:

## 2022-03-28 NOTE — PROGRESS NOTES
Palliative Medicine Outpatient Services  [place]: [phone] ([phone])    Patient Name: Uri Rea  YOB: 1960    Date of Current Visit: 03/28/22  Location of Current Visit:    [] [facility] Office  [] [facility] Office  [] [id number] [place] [facility] Office  [] Home  [x]Synchronous real-time A/V virtual visit    Date of Initial Visit: 9/27/21  Referral from: Mychal Cowan DO  Primary Care Physician: None      SUMMARY:   Uri Rea is a 63y.o. 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 Robyn Rodriges for symptom management and psychosocial support. She was diagnosed in 7/2021, underwent surgical resection of left temporal mass 8/11/21. She received radiation therapy to C4 lesion. She was treated with Keytruda through 12/2021. CT 2/2022 showed disease progression vs possible pseudo-progression. She's chosen to forego further cancer-directed therapy to focus upon quality of life. She had an informational session with Yoni mcdonnell in 3/2022, deferring admission while pursuing insurance and disability issues. The patients social history includes: she is  and lives alone. 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 She has an adult daughter, Lyubov Barron, who has a 11year old [facility]. She also has a 24year old daughter, Yuli Marie, who lives in [place] and plans to attend veterinary school. She has a sister in [place], [place]. She has a sister in [place], [place] who has end stage MS, and an 80year old mother in [place], [place], who is 360 lbs, and currently on hospice. The patient takes care of her mother, and her mother's home, several days each week. She has a best friend, Emmanuel Reyes, who works for [employer].        Palliative Medicine is addressing the following current patient/family concerns: symptoms related to metastatic carcinoma; support in care decisions consistent with her personal goals and values. Initial Referral Intake note reviewed   PALLIATIVE DIAGNOSES:       ICD-10-CM ICD-9-CM    1. Abdominal pain, generalized  R10.84 789.07    2. Right flank pain  R10.9 789.09 morphine CR (MS CONTIN) 30 mg CR tablet      oxyCODONE IR (ROXICODONE) 5 mg immediate release tablet   3. Anxiety  F41.9 300.00 ALPRAZolam (XANAX) 1 mg tablet   4. Pain of right scapula  M89.8X1 733.90 morphine CR (MS CONTIN) 30 mg CR tablet   5. Non-small cell lung cancer, unspecified laterality (HCC)  C34.90 162.9 morphine CR (MS CONTIN) 30 mg CR tablet      oxyCODONE IR (ROXICODONE) 5 mg immediate release tablet   6. Metastatic carcinoma (HCC)  C79.9 199.1 morphine CR (MS CONTIN) 30 mg CR tablet      oxyCODONE IR (ROXICODONE) 5 mg immediate release tablet          PLAN:   Patient Instructions     Dear Aniya Garcia ,    It was a pleasure seeing you today via televisit. We will see you again in 4 weeks for a televisit. If labs or imaging tests have been ordered for you today, please call the office  at [phone] 48 hours after completion to obtain the results. Your described symptoms were: Fatigue: 10 Drowsiness: 0   Depression: 10 Pain: 0   Anxiety: 10 Nausea: 3   Anorexia: 6 Dyspnea: 7 (with activity )   Best Well-Being: 10 Constipation: No   Other Problem (Comment): 10 (Muscle fatigue )       This is the plan we talked about:      1. Abdominal pain/gas/bloating/diarrhea/constipation   -You've had life-long issues with constipation and diarrhea consistent with irritable bowel syndrome  -Start taking dicyclomine (bentyl) 20-mg four times daily on a regular basis  -Continue Gas-x as needed  -Limit foods to those on the FODMAP diet (see below)    2.  Back and neck pain  -You've been having more pain in your right back \"where the kidney and adrenal gland\" are located  -Continue extended-release morphine to 30-mg every 12 hours  -Continue oxycodone 5-mg to 2 tabs every 4 hours as needed  -Continue tylenol 500-mg: take 2 tabs every 6 hours as needed    3. Fatigue/poor sleep  -Continue Ambien CR 12.5-mg at bedtime as needed  -Do not take Xanax with Ambien    4. Anxiety and depression  -You're feeling overwhelmed with family and friends reaching out and coming over to your home  -Today we discussed strategies to manage this  -Continue Zoloft 50-mg daily  -Continue Xanax 1-mg twice daily as needed    5. Lung cancer  -You completed radiation therapy to your neck on 10/19 and 10/21/21  -Your most recent scans in late February showed slight disease progression on Keytruda  -You've decided not to continue with cancer therapy  -You've met with hospice and you plan on receiving hospice services at some point soon  -You meet with Dr. Hemalatha Segovia again next week    6. Advance Care Planning/Goals-of-Care (we talked about this at your initial visit)  -Quality of life is more important to you than quantity of life and you make your health care decisions guided by this value  -You've completed your Advance Medical Directives, naming your friend, Wes Kim, who is a hospice nurse, as your primary medical decision-maker      This is what you have shared with us about Advance Care Planning:      Primary Decision Maker (Active): Carol Galaviz - [phone]  Advance Care Planning 3/28/2022   Patient's [address] is: Named in scanned ACP document   Confirm Advance Directive Yes, on file   Patient Would Like to Complete Advance Directive -   Does the patient have other document types -           The Palliative Medicine Team is here to support you and your family. Sincerely,      Lulu Rico MD and the Palliative Medicine Team         Learning About the Low FODMAP Diet for Irritable Bowel Syndrome (IBS)  What is the low-FODMAP diet? A low-FODMAP diet is a way to find out what foods give you digestion problems. You stop eating certain high-FODMAP foods for about 2 months. Then you add them back to see how your body reacts. This is called a \"challenge diet. \" A dietitian or doctor can help you follow this diet. FODMAPs are carbohydrates. They are in many types of foods. FODMAP stands for:  · F ermentable. · O ligosaccharides. · D isaccharides. · M onosaccharides. · A nd p olyols. If you have digestive problems, some of these foods can make your symptoms worse. When you are on this diet, you can still eat certain fruits and vegetables. You can also eat certain grains, meats, fish, and lactose-free milks. What is it used for? If you have irritable bowel syndrome (IBS), you can ease your symptoms by not eating some types of foods. Some people also use this diet for inflammatory bowel disease (IBD) or some food intolerances. High-FODMAP foods can be hard to digest. They pull more fluid into your intestines. They are also easily fermented. This can lead to bloating, belly pain, gas, and diarrhea. The low-FODMAP diet can help you figure out what foods to avoid. And it can help you find foods that are easier to digest.  This diet can help with symptoms of some digestive diseases. But it's not a cure. You will still need to manage your condition. How does it work? You will work with a doctor or dietitian when you start the diet. At first, you won't eat any high-FODMAP foods for a few weeks. Go to www.FFWD. Vend to learn more about this diet. Laine Ximena also find links to an debra for your phone or other device. You'll find low-FODMAP cookbooks there too. After 6 to 8 weeks, you will start to try high-FODMAP foods again. You will add those foods back to your diet, one group at a time. Your doctor or dietitian will probably have you wait a few days before you add each new group of those foods. Keep a food diary. You can write down the foods you try and note how they make you feel.   After a few weeks, you may have a better idea of what foods you should avoid and what foods make you feel your best.  What are the risks? There is some risk of not getting all of the vitamins and nutrients you need on the low-FODMAP diet. These include:  · Folate. · Thiamin. · Vitamin B6.  · Calcium. · Vitamin D. Your dietitian or doctor can help you find other sources of these if needed. This diet may limit your fiber intake. Try to plan your meals to include other sources of fiber. What foods are on the low-FODMAP diet? Here is a guide to foods that you can eat, plus the foods that you should avoid, when you are on the low-FODMAP diet. Grains  Okay to eat: Foods made from grains like arrowroot, buckwheat, corn, millet, and oats. You can also eat potato, quinoa, rice, sorghum, tapioca, and teff. Cereals, pasta, breads, corn tortillas and baked goods made from these grains are also okay. (These grains may be labeled \"gluten-free. \")  Avoid: Grains like wheat, barley, and rye. Avoid ingredients such as bulgur, couscous, durum, and semolina. And avoid cereals, breads, and pastas made from these grains. Avoid chickpea, lentil, and pea flour. Proteins  Okay to eat: Most meat, fish, and eggs without high-FODMAP sauces. You can have small amounts of almonds or hazelnuts (10 nuts). Macadamia nuts, peanuts, pecans, pine nuts, and walnuts are also okay. You can also eat siidro and pumpkin seeds, tofu, and tempeh. Avoid: Beans, chickpeas, lentils, and soybeans. Avoid pistachio and cashew nuts. And some sausages may have high-FODMAP ingredients. Dairy  Okay to eat: Lactose-free dairy milks. Rice milk and almond milk are okay. So are lactose-free yogurts, kefirs, ice creams, and sorbet from low-FODMAP fruits and sweeteners. (These are often labeled \"lactose-free. \") You can have small amounts (2 Tbsp) of cottage, cream, or ricotta cheese. Hard cheeses like cheddar, [place], ROSS, and Swiss are okay. So are small amounts (1 oz) of aged or ripened cheeses like Brie, blue, and feta.   Avoid: Milk, including cow, goat, and sheep. Avoid condensed or evaporated milk, buttermilk, custard, cream, sour cream, yogurt, and ice cream. Avoid soy milk. (Check sauces for dairy ingredients.)  Vegetables  Okay to eat: Bamboo shoots, bell peppers, bok joshua, up to ½ cup of broccoli or cabbage (red or white), and cucumbers. Eggplant, green beans, lettuce, olives, parsnips, and potatoes are okay to eat. So are pumpkin, rutabaga, seaweed, sprouts, Swiss chard, and spinach. You can eat scallions (green part only) and squash (not butternut). You can eat tomatoes, turnips, watercress, yams, and zucchini. You can also have small amounts of artichoke hearts (from can, 1 oz), carrots, corn (½ cob), and sweet potato (½ cup). Avoid: Artichokes, asparagus, [place] sprouts, luis manuel cabbage, cauliflower, and celery. And avoid garlic, leeks, mushrooms, okra, onions, scallions (white part), shallots, and peas. Fruits  Okay to eat: Bananas, blueberries, cantaloupe, coconut, grapes, and honeydew. Kiwi, neris, limes, oranges, passion fruit, papaya, and pineapple are also okay. You can eat plantain, raspberries, rhubarb, star fruit, strawberries, tangelo, and tangerine. You can also have small amounts of dried banana chips (up to 10 chips), dried cranberries (1 Tbsp), and shredded coconut (up to ¼ cup). Avoid: Apples, applesauce, apricots, avocados, blackberries, boysenberries, and cherries. Also avoid dates, figs, grapefruit, guava, lychee, and mangoes. Don't eat nectarines, peaches, pears, persimmon, plums, prunes, tamarillo, or watermelon. And limit most canned and dried fruits. Oils, spices, condiments, and sweeteners  Okay to eat: Vegetable oils (including garlic infused), butter, ghee, lard, and margarine (no trans fat). You can have most fresh herbs like basil, chives, coriander, yee, parsley, rosemary and thyme. You can have salt, jams made from low-FODMAP fruits, mayonnaise, and mustard.  Soy sauce, hot sauce (no garlic), tamari, and vinegar are also okay. Sweeteners that are okay include sugar (sucrose), powdered (confectioner's) sugar, brown sugar, glucose, and maple syrup. You can also have some artificial sweeteners like aspartame, saccharine, and stevia. Avoid: Chutneys, hummus, jellies, garlic sauces, and gravies made with onion or garlic. Avoid pickles, relish, some salad dressings and soup stocks, salsa, and tomato paste. And avoid sauces and other foods with high fructose corn syrup, honey, molasses, and agave. Avoid artificial sweeteners (isomalt, mannitol, malitol, sorbitol, and xylitol). Avoid corn syrup solids, fructose, fruit juice concentrate, and polydextrose. Other foods and drinks  Okay to have: Water, soda water, tonic, soft drinks sweetened with sugar, ½ cup of low-FODMAP fruit juice, and most teas and alcohols. You can also eat foods made with baking powder and soda, cocoa, and gelatin. Avoid: Juices from high-FODMAP fruits and vegetables. And avoid fortified luzmaria, chamomile and fennel teas, chicory-based drinks and coffee substitutes, and bouillon cubes. Follow-up care is a key part of your treatment and safety. Be sure to make and go to all appointments, and call your doctor if you are having problems. It's also a good idea to know your test results and keep a list of the medicines you take. Where can you learn more? Go to http://www.gray.com/  Enter L235 in the search box to learn more about \"Learning About the Low FODMAP Diet for Irritable Bowel Syndrome (IBS). \"  Current as of: December 17, 2020               Content Version: 13.0  © 7662-6127 Healthwise, Incorporated. Care instructions adapted under license by [employer] (which disclaims liability or warranty for this information).  If you have questions about a medical condition or this instruction, always ask your healthcare professional. [place] [address] any warranty or liability for your use of this information. GOALS OF CARE / TREATMENT PREFERENCES:   [====Goals of Care====]  GOALS OF CARE:  Patient / health care proxy stated goals: See Patient Instructions / Summary    TREATMENT PREFERENCES:   Code Status:  [x] Attempt Resuscitation       [] Do Not Attempt Resuscitation    Advance Care Planning:  [x] The [employer] Interdisciplinary Team has updated the ACP Navigator with Decision Maker and Patient Capacity      Primary Decision Maker (Active): Kt Pean - [phone]  Advance Care Planning 3/28/2022   Patient's Healthcare Decision Maker is: Named in scanned ACP document   Confirm Advance Directive Yes, on file   Patient Would Like to Complete Advance Directive -   Does the patient have other document types -       Other:  (If patient appropriate for POST, consider using PALLPOST smart phrase here)    The palliative care team has discussed with patient / health care proxy about goals of care / treatment preferences for patient.  [====Goals of Care====]     PRESCRIPTIONS GIVEN:     Medications Ordered Today   Medications    albuterol (PROVENTIL HFA, VENTOLIN HFA, PROAIR HFA) 90 mcg/actuation inhaler     Sig: Take 2 Puffs by inhalation every four (4) hours as needed for Wheezing. Dispense:  18 g     Refill:  1    ALPRAZolam (XANAX) 1 mg tablet     Sig: Take 1 Tablet by mouth two (2) times daily as needed for Anxiety. Max Daily Amount: 2 mg. TAKE 1 TABLET BY MOUTH EVERY 4 HOURS AS NEEDED FOR ANXIETY     Dispense:  60 Tablet     Refill:  0    morphine CR (MS CONTIN) 30 mg CR tablet     Sig: Take 1 Tablet by mouth every twelve (12) hours for 30 days. Max Daily Amount: 60 mg. Dispense:  60 Tablet     Refill:  0     Discontinue extended-release morphine 15-mg every 12 hours    oxyCODONE IR (ROXICODONE) 5 mg immediate release tablet     Sig: Take 2 Tablets by mouth every four (4) hours as needed for Pain for up to 15 days. Max Daily Amount: 60 mg.      Dispense:  150 Tablet Refill:  0           FOLLOW UP:     Future Appointments   Date Time Provider Lakeshia Valencia   4/11/2022  3:30 PM Nhung ShavonncniniАндрей  [facility]           PHYSICIANS INVOLVED IN CARE:   Patient Care Team:  None as PCP - General  Andrea Nolen DO (Hematology and Oncology)  Evelina Mehta MD as Physician (Palliative Medicine)  Evelina Mehta MD as Physician (Palliative Medicine)  Milla Hernandez RN as Benefits Care Manager  MARILYN Mosher as Benefits Care Manager       HISTORY:   Reviewed patient-completed ESAS and advance care planning form. Reviewed patient record in prescription monitoring program.    CHIEF COMPLAINT:   Chief Complaint   Patient presents with    Abdominal Pain    Flank Pain       HPI/SUBJECTIVE:    The patient is: [x] Verbal / [] Nonverbal      See Plan/Patient Instructions for interval history    From IV 9/27/2021:  She had a headache and pain in her right chest when she returned from a trip to [facility] last July. She had a chest X-ray which showed the mass in her lung, then had a CT scan which showed the brain mass. She had gamma knife (Dr. Evelio Stafford), this helped with the headache. She started [place] about 2 weeks ago. Since then, she's had more pain in her right chest and in her abdomen \"where the cancer is. \"  She's also been having neck pain and a burning pain from her back, under her right shoulder blade. She's been taking 1 to 2 oxycodone ~ 4 times a day which helps but wears off after 4 to 4.5 hours. She's also been taking tylenol. .  She's going to start radiation therapy tomorrow for the lesion in her neck. She feels very fatigued this morning. She takes care of her grandson, Jose De Jesus Freeman, 3 days a week while her daughter works. He spends the night with her, too. She also cares for her 80-year old mother, who lives in her own home with hospice support.   She goes to her mother's house every morning to help her bath, does the laundry. She hasn't been sleeping well since her cancer diagnosis. She takes Xanax at bedtime which helps her go to sleep, then takes melatonin when she wakes up in the middle of the night. She usually goes back to sleep after taking melatonin. She tried Ambien in the past and it made her feel too sedated the following day, interfered with her work. Her appetite is good. She's had trouble with her bowels all of her life. She goes between constipation and diarrhea. She's never seen a doctor for this. She usually takes Metamucil when she gets constipated. She hasn't noticed any difference in her bowels since she started taking oxycodone. She's been taking Xanax for 15 years for \"anger\" related to family issues. She's used it from time-to-time in the past, more since her cancer diagnosis. She usually takes 1/2 tab during the day if she needs it. She's never taken any other medications for anxiety or depression. She wouldn't have had gamma knife but she needed to get her affairs in order. 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 She doesn't want to be like that. She now has a will and made plans for her cremation. She had a AMD; her friend, Bryan Bonilla, is a hospice RN and is her decision-maker.     Clinical Pain Assessment (nonverbal scale for nonverbal patients):   [++++ Clinical Pain Assessment++++]  [++++Pain Severity++++]: Pain: 0  [++++Pain Character++++]: burning under shoulder blade; sharp, knife-like other areas  [++++Pain Duration++++]: since August 2021  [++++Pain Effect++++]:  [++++Pain Factors++++]: oxycodone helps; wearing abdominal binder helps  [++++Pain Frequency++++]: constant with varying intensity  [++++Pain Location++++]: neck; under right shoulder blade; upper quadrant abdomen; right lower flank  [++++ Clinical Pain Assessment++++]       FUNCTIONAL ASSESSMENT:     Palliative Performance Scale (PPS):  PPS: 60 PSYCHOSOCIAL/SPIRITUAL SCREENING:     Any spiritual / Episcopal concerns:  [] Yes /  [x] No    Caregiver Burnout:  [] Yes /  [] No /  [x] No Caregiver Present      Anticipatory grief assessment:   [x] Normal  / [] Maladaptive       ESAS Anxiety: Anxiety: 10    ESAS Depression: Depression: 10       REVIEW OF SYSTEMS:     The following systems were [x] reviewed / [] unable to be reviewed  Systems: constitutional, ears/nose/mouth/throat, respiratory, gastrointestinal, genitourinary, musculoskeletal, integumentary, neurologic, psychiatric, endocrine. Positive findings noted below. Modified ESAS Completed by: provider   Fatigue: 10 Drowsiness: 0   Depression: 10 Pain: 0   Anxiety: 10 Nausea: 3   Anorexia: 6 Dyspnea: 7 (with activity )   Best Well-Being: 10 Constipation: No   Other Problem (Comment): 10 (Muscle fatigue )          PHYSICAL EXAM:     Wt Readings from Last 3 Encounters:   01/27/22 145 lb 8 oz (66 kg)   12/16/21 144 lb 9.6 oz (65.6 kg)   12/16/21 144 lb 9.6 oz (65.6 kg)     There were no vitals taken for this visit.   Last bowel movement: See Nursing Note    Constitutional    [x] Appears well-developed and well-nourished in no apparent distress; appears fatigued    [] Abnormal:  Mental status  [x] Alert and awake  [x] Oriented to person/place/time  [x] Able to follow commands  [] Abnormal:   Eyes  [x] EOM normal   [x] Sclera normal   [x] No visible ocular discharge  [] Abnormal:   HENT  [x] Normocephalic, atraumatic  [x] Mouth/Throat: Moist mucous membranes   [x] External Ears normal  [] Abnormal:  Neck  [x] No visualized mass  [] Abnormal:  Pulmonary/Chest   [x] Respiratory effort normal  [x] No visualized signs of difficulty breathing or respiratory distress  [] Abnormal:  Musculoskeletal  [x] Normal gait with no signs of ataxia  [x] Normal range of motion of neck  [] Abnormal:  Neurological:   [x] No facial asymmetry (Cranial nerve 7 motor function)  [x] No gaze palsy  [] Abnormal:   Skin  [x] No significant exanthematous lesions or discoloration noted on facial skin  [] Abnormal:                                  Psychiatric  [x] Normal affect  [x] No hallucinations  [] Abnormal:    Other pertinent observable physical exam findings:    Due to this being a TeleHealth evaluation, many elements of the physical examination are unable to be assessed. HISTORY:     Past Medical History:   Diagnosis Date    Cancer [facility]) 2021    LUNGS AND BRAIN AND LYMPH NODES    Hypercholesterolemia     Metastatic carcinoma ([facility] [address] 8/19/2021      Past Surgical History:   Procedure Laterality Date    HX BREAST AUGMENTATION      HX HERNIA REPAIR      IMPLANT BREAST SILICONE/EQ Bilateral 6193    sub-pectoral      Family History   Problem Relation Age of Onset    Depression Mother     Obesity Mother     Heart Disease Mother     Mult Sclerosis Sister         END STAGE    Cancer Brother         TONSIL, WITH METS TO LIVER AND BONES    Heart Disease Maternal Grandfather     Cancer Paternal Grandmother     Anesth Problems Neg Hx       History reviewed, no pertinent family history. Social History     Tobacco Use    Smoking status: Current Every Day Smoker     Packs/day: 0.75     Years: 40.00     Pack years: 30.00    Smokeless tobacco: Never Used   Substance Use Topics    Alcohol use: Yes     Alcohol/week: 3.0 standard drinks     Types: 3 Cans of beer per week     Comment: socially     No Known Allergies   Current Outpatient Medications   Medication Sig    albuterol (PROVENTIL HFA, VENTOLIN HFA, PROAIR HFA) 90 mcg/actuation inhaler Take 2 Puffs by inhalation every four (4) hours as needed for Wheezing.  [START ON 4/13/2022] ALPRAZolam (XANAX) 1 mg tablet Take 1 Tablet by mouth two (2) times daily as needed for Anxiety. Max Daily Amount: 2 mg.  TAKE 1 TABLET BY MOUTH EVERY 4 HOURS AS NEEDED FOR ANXIETY    [START ON 3/30/2022] morphine CR (MS CONTIN) 30 mg CR tablet Take 1 Tablet by mouth every twelve (12) hours for 30 days. Max Daily Amount: 60 mg.    oxyCODONE IR (ROXICODONE) 5 mg immediate release tablet Take 2 Tablets by mouth every four (4) hours as needed for Pain for up to 15 days. Max Daily Amount: 60 mg.    zolpidem CR (AMBIEN CR) 12.5 mg tablet Take 1 Tablet by mouth nightly as needed for Sleep. Max Daily Amount: 12.5 mg.    sertraline (ZOLOFT) 50 mg tablet Take 1 Tablet by mouth daily.  dicyclomine (BENTYL) 10 mg capsule Take 1 Capsule by mouth four (4) times daily as needed for Abdominal Cramps.  levETIRAcetam (KEPPRA) 500 mg tablet TAKE ONE TABLET BY MOUTH TWO TIMES A DAY    ferrous sulfate (IRON PO) Take  by mouth.  MULTIVITAMIN PO Take  by mouth.  simethicone (GAS-X) 125 mg capsule Take 125 mg by mouth daily as needed for Flatulence.  acetaminophen (TYLENOL) 325 mg tablet Take 2 Tablets by mouth every six (6) hours as needed for Pain.  tiotropium (Spiriva with HandiHaler) 18 mcg inhalation capsule Take 1 Capsule by inhalation daily.  estradioL (Estrace) 0.5 mg tablet Take 0.5 mg by mouth daily.  ondansetron (ZOFRAN ODT) 4 mg disintegrating tablet Take 1-2 Tablets by mouth every eight (8) hours as needed for Nausea or Vomiting. (Patient not taking: Reported on 3/28/2022)    diphenoxylate-atropine (LOMOTIL) 2.5-0.025 mg per tablet TAKE 2 TABLETS BY MOUTH TWO (2) TIMES DAILY AS NEEDED FOR DIARRHEA. MAX DAILY AMOUNT: 4 TABLETS. (Patient not taking: Reported on 3/28/2022)    QUEtiapine (SEROquel) 25 mg tablet Take 12.5-mg at bedtime as needed for sleep (Patient not taking: Reported on 1/31/2022)    naloxone (Narcan) 4 mg/actuation nasal spray Use 1 spray intranasally, then discard. Repeat with new spray every 2 min as needed for opioid overdose symptoms, alternating nostrils. (Patient not taking: Reported on 3/28/2022)    polyethylene glycol (Miralax) 17 gram/dose powder Take 17 g by mouth as needed for Constipation.  (Patient not taking: Reported on 2/28/2022)     No current facility-administered medications for this visit. LAB DATA REVIEWED:     Lab Results   Component Value Date/Time    WBC 19.6 (H) 02/28/2022 02:20 PM    HGB 11.8 02/28/2022 02:20 PM    PLATELET 411 (H) 90/35/0630 02:20 PM     Lab Results   Component Value Date/Time    Sodium 132 (L) 02/28/2022 02:20 PM    Potassium 4.5 02/28/2022 02:20 PM    Chloride 101 02/28/2022 02:20 PM    CO2 28 02/28/2022 02:20 PM    BUN 7 02/28/2022 02:20 PM    Creatinine 0.68 02/28/2022 02:20 PM    Calcium 9.8 02/28/2022 02:20 PM      Lab Results   Component Value Date/Time    Alk. phosphatase 154 (H) 02/28/2022 02:20 PM    Protein, total 6.9 02/28/2022 02:20 PM    Albumin 2.9 (L) 02/28/2022 02:20 PM    Globulin 4.0 02/28/2022 02:20 PM     No results found for: INR, PTMR, PTP, PT1, PT2, APTT, INREXT, INREXT   Lab Results   Component Value Date/Time    Iron 31 (L) 10/28/2021 11:08 AM    TIBC 205 (L) 10/28/2021 11:08 AM    Iron % saturation 15 (L) 10/28/2021 11:08 AM    Ferritin 1,539 (H) 10/28/2021 11:08 AM        CT chest 7/23/21:  Spiculated right upper lobe mass with positive right hilar and mediastinal  adenopathy. Probable hepatic and possible left adrenal metastases. Recommend  PET/CT. MRI brain 8/3/21:  1. 2.5 cm left temporal lobe mass with subacute hemorrhage. Considering  patient's history this is most likely metastasis. 2. Mild nonspecific white matter T2 hyperintensity may be related to mild  chronic small vessel ischemic change. PET-CT 6/64/48:  Hypermetabolic mass lesion right upper lobe with hypermetabolic  mediastinal and right hilar lymphadenopathy. Hypermetabolic right adrenal mass  is concerning for metastatic disease.     MRI cervical spine 9/12/21:  1. C4 vertebral tumor/metastasis extending into the right foramen, without  spinal stenosis. 2. Multilevel cervical degenerative disc disease. 3. No fracture or subluxation. CT chest/abdomen/pelvis  2/23/22:  1.   Interval slight increase in pulmonary nodule burden, including slight  increase in size of a 2.8 cm x 2.2 cm dominant right upper lobe nodule. 2.  Interval increase in size of necrotic right adrenal lesion. 3.  Stable mild mediastinal, right hilar, and upper retroperitoneal  lymphadenopathy. 4.  Slightly increasing small to moderate pericardial effusion. CONTROLLED SUBSTANCES SAFETY ASSESSMENT (IF ON CONTROLLED SUBSTANCES):     Reviewed opioid safety handout:  [x] Yes   [] No  24 hour opioid dose >150mg morphine equivalent/day:  [] Yes   [x] No  Benzodiazepines:  [x] Yes   [] No  Sleep apnea:  [] Yes   [x] No  Urine Toxicology Testing within last 6 months:  [] Yes   [x] No  History of or new aberrant medication taking behaviors:  [] Yes   [x] No  Has Narcan been prescribed [] Yes   [x] No          Total time:   Counseling / coordination time:   > 50% counseling / coordination?:     Consent:  He and/or health care decision maker is aware that that he may receive a bill for this telehealth service, depending on his insurance coverage, and has provided verbal consent to proceed: Yes    CPT Codes 47762-19291 for Established Patients may apply to this Telehealth Visit    Pursuant to the emergency declaration under the [facility], [facility] waiver authority and the [employer] Act, this Virtual  Visit was conducted, with patient's consent, to reduce the patient's risk of exposure to COVID-19 and provide continuity of care for an established patient. Services were provided through a video synchronous discussion virtually to substitute for in-person clinic visit.

## 2022-03-30 NOTE — TELEPHONE ENCOUNTER
Palliative Medicine  Nursing Note  [phone]  Fax [phone]      Telephone Call  Patient Name: David Mancia  YOB: 1960    3/30/2022        Primary Decision Maker (Active): Martinez Ni - [phone]   Advance Care Planning 3/28/2022   Patient's Healthcare Decision Maker is: Named in scanned ACP document   Confirm Advance Directive Yes, on file   Patient Would Like to Complete Advance Directive -   Does the patient have other document types -       Per Dr. Devyn Julian, hospice referral placed for Ms. Edvin Delvalle. Hospice is already aware and will admit her once her [employer] has been approved.     Soledad Contreras RN  Palliative Medicine

## 2022-03-31 NOTE — PROGRESS NOTES
Follow up phone call to patient, two pt identifiers verified. Patient states she is doing worse. Asked her if there is anything in particular going on. Asked her if her pain was being well controlled, states that her provider is increasing medication for this. Has a phone call with [employer] later today. Has assistance from family members with this. No needs at this time. Will continue to monitor. Per chart review, patient will transition to hospice when COBRA is effective. Follow up call in two weeks. Patient's primary care provider relationship reviewed with patient and modified, as applicable. Barriers/Challenges to Care: []  Decline in memory    []  Language barrier     []  Emotional                  []  Limited mobility  []  Lack of motivation     [] Vision, hearing or cognitive impairment []  Knowledge [] Financial Barriers []  Lack of support  []  Pain []  Other [x]  None      Upcoming appointments:   Future Appointments   Date Time Provider Lakeshia Valencia   4/11/2022  3:30 PM Martha Partida  [address] BS AMB   4/26/2022  9:30 AM Alvina Dai MD [facility] BS AMB     Plan for next call: Call on 4/14. Transitioned to hospice?

## 2022-04-05 NOTE — TELEPHONE ENCOUNTER
Palliative Medicine  Nursing Note  [phone]  Fax [phone]      Telephone Call  Patient Name: Alex Partida  YOB: [birth year]        Primary Decision Maker (Active): Edwardo Sahni - [phone]   Advance Care Planning 3/28/2022   Patient's Healthcare Decision Maker is: Named in scanned ACP document   Confirm Advance Directive Yes, on file   Patient Would Like to Complete Advance Directive -   Does the patient have other document types -       Call placed to Hospice to inquire into status of Hospice for Ms. Fonseca. Spoke with Kim Robbins who shared that pt. just received her [employer] and Hospice should be admitting her tomorrow. She felt that it would be best if Palliative could cover her Bentyl today as patient is out.     Dr. Nory Salazar aware and medication is pended for approval.    Lesly Ospina RN  Palliative Medicine

## 2022-04-05 NOTE — TELEPHONE ENCOUNTER
Palliative Medicine  Nursing Note  [phone])  Fax [phone]      Telephone Call  Patient Name: Ivelisse Paiz  YOB: [birth year]        Primary Decision Maker (Active): Jameson Vyas - [phone]   Advance Care Planning 3/28/2022   Patient's Healthcare Decision Maker is: Named in scanned ACP document   Confirm Advance Directive Yes, on file   Patient Would Like to Complete Advance Directive -   Does the patient have other document types -     Dr. Filemon Howell approved Bentyl prescription. Call placed to Ms. Fonseca to inform of the above. She stated she has been out for a few days and has now noticed that her abdomen is a bit distended and she has occasional sharp pain to her left lower abdomen just above her hip bone. She feels it may be related to gas pains. She is voiding and having regular bowel movements, somewhat loose, but not diarrhea, for past 2 days. She is passing gas without difficulty. She denies any nausea or vomiting and is eating and drinking well. Informed that her MS Contin is also available at her pharmacy. Encouraged her to call for any additional questions or concerns or if the pain continues or worsens. She verbalized understanding.     Hesham Yu RN  Palliative Medicine

## 2022-04-07 NOTE — HOSPICE
Flakito Fonseca  1960  61                                                         Principle Hospice Diagnosis: metatstaic carcinoma  Diagnoses RELATED to the terminal prognosis:   Other Diagnoses:     Date of Hospice Admission: 2022  Hospice Attending Elected by Patient: Joe Rodriguez  Primary Care Physician: none    Admitting RN: Dipti Chin  Nurse CM: Elysia Hoyos  : Sam Band:   Jorge Delgado DNR:     Not at this time but may sign with MSW   Service: Yes              Appropriate for Pinning Ceremony:  Yes         Home: did not discuss    Hospice Summary:pt sitting on front porch with 2 friends. pt is fiercely independent. Her 2 friends come over daily to check on her and do her shopping. ambulatory, independent in most ADLs, no falls. appetite poor. breath sounds clear. active bowel sounds. last BM today, small. ordered senna s to start . pt has pain radiating from left side of abdomen radiating to back. She insists it is gas pain but she describes sharp, stabbing in left hip. has bone bryce to the spine, suspect bryce in hip as well.  taking morphine ER 30 q 12 and oxy 10 q 4 prn.  added ibuprofen 3 x daily. She is not vaccinated. ER Visits/ Hospitalizations in past year: 1 trip to the ED last July and was diagnosed with ca with bryce    Onset Date of Hospice Diagnosis: 2021    Summary of Disease Progression Leading to Hospice Diagnosis:  From Dr Israel Troncoso Carrol Beatty is a 63y.o. 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 James Vazquez for symptom management and psychosocial support. She was diagnosed in 2021, underwent surgical resection of left temporal mass 21. She is currently being treated with immune therapy Palmer Archuleta) and has been referred to Radiation-Oncology for C4 lesion.  She is receiving cancer-directed therapy with the goal of palliation of symptoms and prolongation of quality life. Quality of life (rather than quantity) is important to her and guides her health care decisions. Use LCD Guidelines and list features:     ___X_____  A. Disease with distant metastases at presentation OR    __X______  B. Progression from an earlier stage of disease to metastatic disease with either:                          ____X____  1. continued decline in spite of therapy                          ___X_____  2. patient declines further disease directed therapy          SPIRITUAL/Social/Emotional: Jagr[place]. has a . pt is , lives alone. has 2 daughters, one  with a child who lives in [address] [place] [address]. the other is in college in [place]. 2 brothers are , i sister who lives in [address] [place] [address] has Luite Reese 87, mother has recently been with [facility]. has one healthy sister who lives in [place]. her best friend, [address] [place] [address], [address] is her her MPOA and is a MSW with [employer]       Dr. Land______________ contacted, agrees to serve as attending provider for hospice and provided verbal certification of terminal illness with prognosis of 6 months or less life xpectancy. Orders for hospice admission, medications and plan of treatment received. Medication reconciliation completed.     Currently this patient has:        MEDS:  I have reviewed the patient's medication list with MD and identified the following:  Nonformulary medications: ondansetron, zolpidem cr  Unrelated medications: probiotic    IDT communication to include MD, SN, SW, [address] [place] [address] and support team.

## 2022-04-08 NOTE — HOSPICE
Patient is sitting in Living room when writer arrived. Patient is alert and oriented, still able to do her own ADL's. Patient is eating about 1 meal a day, she weighted 150 lbs now she is 117 lbs. Writer advised patient to eat frequent small meals, patient has IBS. Writer reviewed hospice care plan and medications made patient's pill box. Patient complaining of abdomen pain 4/10, which is tolerable. Patient stated when she gets up moving around her pain goes up to a 10/10. Patient has not had a pain pill since 5:30 am. Writer advised patient to take oxycodone to keep pain under control. Patient took oxycodone. Patient talked about he life and her children and plans for her care in the future. Writer ask her to monitor her pain if it gets out of control to call Hospice. Writer ask patient to call hospice with any questions or concerns.

## 2022-04-09 NOTE — HOSPICE
LMSW arrived at the patient's apartment to complete an Initial Psychosocial Assessment for [facility]. The LMSW was greeted at the front door by the patient's friend Wm Britton, her , and the [facility] were present. The home was clean and clutter free. The patient is a 62 y/o  female with a BSC Dx. Metastatic carcinoma. The patient was received sitting on a couch with the RNCM reviewing medications upon arrival. LMSW received some background information from Wm Britton. Patient reported that she was in the [employer] for 4 years. 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 Patient was  twice and both husbands have passed. Patient has two DTRS: Sandy Guillen who lives in the [facility] and Eva Earl who is a college student in [place]. Patient has a sister who lives in [place]. and came to visit last week. Patient did not mention a Voodoo. LMSW provided community resources, emotional support, and information on the ancillary services.

## 2022-04-11 NOTE — PROGRESS NOTES
Patient logged on for a virtual appointment with Provider. States has enrolled in [address] and inquired if visit is needed. Reassured patient that it was not, that Hospice would assume her care and her meds now that she was enrolled. Appt cancelled. Update to Provider/LCSW.

## 2022-04-13 PROBLEM — Z51.5 HOSPICE CARE PATIENT: Status: ACTIVE | Noted: 2022-01-01

## 2022-04-14 NOTE — PROGRESS NOTES
Attempt to reach patient for follow up. Discreet VM left with contact information. Will attempt another outreach on 4/21. Per chart review, patient has enrolled in Hospice care.

## 2022-04-14 NOTE — HOSPICE
Writer and Berenice Mckeon NP out to see patient today. She is a 63year old patient with Metastatic cancer. Patient is more confused today, family reports they have noticed more confusion. Patient is alert and oriented x 3. Patient looks weak. Patient reports eating one meal daily. Patient is complaining of pain of 10/10. Patient took 10 mg of oxycodone at 9 am. Marcial Ortega increased oxycodone 20 mg every 4 hours scheduled. Patient took 10 mg  at visit. Marcial Ortega reviewed medications and added list at bottom of note. Patient blood pressure is in the 80 which patient stated is normal for her, she occasionally has dizziness when she stands. Patient was advised to stand slowly. Writer wrote down medication changes and reviewed careplan. Patient stated her pain level is a 9. Patient wanted to go take a shower, so writer left. Patient daughter and friend Amador Weston at visit and stated understanding of medications and care plan. Writer ask them to call with any questions or concerns.         Gabapentin 300 mg for sciatica pain  Dexamethasone 2 mg BID   Oxycodone 20 mg every 4 hours scheduled  Iron 325 mg for Anemia       NEW MEDICATION INITIATION DOCUMENTATION:  Consulted AT MD to report change in patient status: YES  Obtained Order from Provider for initiation of Symptom relief medication / other medication needed:YES   Documentation completed in Telephone/Visit Note in [facility]  Reason medication is being initiated: pain, anemia  MD / Provider name consulted re: change in status / initiation of new medication: Berenice Mckeon NP  New Symptom(S): pain  New Order(S): see above  Name of person being taught: patient and daughter  Instructions given: YES  Side Effects taught:YES  Response to teaching: understanding

## 2022-04-21 NOTE — PROGRESS NOTES
Resolving current episode of case management. Patient has met patient stated and/or medical goals. Patient consistenly demonstrates understanding of the medical action plan through execution of plan. Appointments with key providers are scheduled and attended. Plan of care is modified and updated to address new challenges and barriers with minimal support from the CM team(proactively uses physicians and community resources) The support system remains current and has been modified as needed. Patient continues to acquire needed resources from the current support system established. Teach back demonstrates that patient understands education for self management of chronic conditions. Patient consistenly communicates understanding of signs,symptoms and complications for all major diagnoses. Patient modifies his/her lifestyle toreduce or avoid risk factors to his/her health. ECM will follow as needed and patient has ECM contact information for future needs. Patient has transitioned to hospice care. States they told her she had about two weeks left. No needs at this time. Provided support and encouragement to her and her family.

## 2022-04-22 NOTE — HOSPICE
Patient is laying on the sofa when writer arrived. Patient complaining of a pain of 3/10 in her abdomen. Patient very tearful today, Writer listened to patient who stated she is ready to go, but no body else is accepting that she is going to pass. Patient reported she fell 3 x this am her left hip just gave out on her, no injuries reported. Writer offered to order a walker or cane patient refused. Writer advised her to get up slowly and hold on to things. Writer advised her not to take a bath with out someone in the house with her. Writer talked with patient about leaving her grandchild things to remember her. Writer made patient's pill box and explained to patient what I was putting in her box. Writer reviewed care plan with patient and ask her to call with any questions or concerns.

## 2022-04-27 NOTE — HOSPICE
Arrived at patient's home she is walking in the kitchen her gait is unsteady. She looks thin than last visit her clothing are fitting loosely. She is complaining of increased pain in her abdomen, her pain increases after eating. Writer reviewed eating lighter and small portions of food at a time. Writer will speak to Dr. Gonzalez Dillon and call patient with up date. Patient has not had a BM for 4 days, writer inserted a suppository. and increased Senna to 4 tablets daily and explained to patient to monitor for diarrhea. She stated understanding. Writer explained to patient she needs someone in the house with her at all times due to her unsteady gait, she stated understanding. Writer made patient pill box and explained medications to her daughter and ask them to call with any questions or concerns. Dr lewis MS Contin 30 mg every 8 hours. Writer called Nathan Garcias patient friend Dr Noelle Scott MS Contin 30 mg every 8 hours she stated understanding and will add to patient's pill box.

## 2022-05-04 NOTE — HOSPICE
Patient is alert and oriented x 4, she is sitting on the sofa rubbing left leg. Patient is complaining of a pain in her left hip down to the floor of a 20/10. Patient stated she been in pain for 2 days now. She is also having diarrhea due to eating [place] food, writer advised patient to take Lomotil for diarrhea. Writer called Pauline Jackson NP order to give oxycodone 20 mg now. Increase MS Contin 45 mg 1 tablets every 8 hours, Gabapentin 300 mg 3 x day. Patient took Morphine 10 mg. Writer talked to patient about going to the Hospice house and having a PCA started. Patient stated I am so tired left me go to sleep and I will go tomorrow. Writer made patient's pill box. Writer reviewed medications and care plan with patient daughter. Patient in bed when writer left.

## 2022-05-06 NOTE — HOSPICE
LMSW arrived at the patient's apartment to complete a routine visit for [facility]. The LMSW was greeted at the front door by the patient and her DTR Gino Hutchison was present. The home was clean and clutter free. The patient is a 62 y/o  female with a [facility] Dx. Metastatic carcinoma. The patient was observed ambulating independently gait intact. Patient denied any recent falls. Patient can perform ADLs independently. LMSW provided supportive counseling related to the [facility] and the pain pump if/when needed. LMSW provided education with the patient about taking her medication consistently to maintain comfort. The patient reported that her DTR Zoila Mckeon will be moving home from college on 5/10/22 through the Summer. Patient's mood is appropriate and congruent with conversation. Gino Hutchison stated that she is coping okay. LMSW reviewed community resources, provided emotional support, and information on the ancillary services.

## 2022-05-11 NOTE — HOSPICE
Patient is sitting in living room when writer arrived. She is alert oriented x 3, forgetfulness and some confusion noted. Patient got a lot of things done this week, she is preparing her daughter for her death, they are planning a celebration of life party. Patient is complaining of left hp pain, she has not taken any pain medication for 5 1/2 hours. Writer explained to patient to take her oxycodone every 4 hours to stay ahead of the pain and use morphine as needed for break through. Patient stated understanding. Patient took oxycodone 20 mg. Patient stated my pain is a lot better than last week. Patient stated if she does some stretching exercises it helps with the hip pain. Patient is now wearing protect underwear, she was incontinent of bowl over the weekend. Writer reviewed medications and made pill box, ask patient to call with any concerns or questions. Patient was up walking around when writer lets, she stated I am feeling better.

## 2022-05-18 NOTE — HOSPICE
Patient is walking around in the kitchen when writer arrived. Patient is alert oriented x 3, some confusion noted. Patient stated my pain has been ok as long as I stay on schedule. Patient daughter stated one morning she woke up in pain, because she forgot to take her night medications. writer advised them to check behind patient. Writer advised patient to pull up a syring of morphine and place it by her bed, if she wakes up in pain she can take it before she gets up. Patient is sleeping up stairs at night time, she stated some times at night time she goes down stairs to set ice, writer advised her to ask her daughter to get her ice, or take a cup that will keep ice cold. Patient friend reported patient fell getting out of a chair x 2, no injuries reported. Patient reminded patient to get up a slowly and ask for assistance. Patient stated she ate a Hamburger that gave her diarrhea. Writer advised her to eat small amounts of foods that easy to digest. Writer made pill box and reviewed care plan with patient, ask her to call with any question or concerns.

## 2022-05-25 NOTE — HOSPICE
Patient is sitting in the living room when writer arrived. Patient is alert and oriented x 3, increased confusion and forgetfulness noted. Patient stated my pain is under control during the day but I wake up in the morning my stomach is hurting, I have a lot of gas, the gas medication is not helping. Writer advised her to try Haldol as needed. Patient stated she took an extra gabapentin and it really helped with her left hip pain. Writer spoke with Bette Ham NP order to take gabapentin 600 mg at bedtime, may increase to 600 mg 3 x day if patient tolerates 600 mg at night. Writer reviewed medications and made pill box. Writer explained to patient she needs someone in the house with her at all times due to her unsteady gait. Patient stated understanding. Patient stated I cannot get out of the bath tub, I am not getting back in the bath tub. Patient looks thinner today her clothes are loose, her face looks thinner than last visit. Patient having diarrhea, writer advised to eat easy digestible foods, and don't eat late in the afternoons. Patient is allowing her daughter to come over more and help with her needs. Writer ask her to call with any questions or concerns.

## 2022-06-02 NOTE — HOSPICE
Arrived at patient, patient sitting on sofa. Patient is alert and oriented x 3, some forgetfulness and confusion noted. Patient stated for the last 3 mornings I wake up in severe abdomen pain I have not pooped for 3 days I have taken Senna. Patient's stomach is hard and distended, hyperactive bowel sounds. Patient stated I been eating a lot this week. Writer offered to give a suppository, patient used the last suppository Writer order a refill of suppository to come today. Patient goes from Diarrhea to constipation, she stops Senna for a few days then has constipation. Writer advised to take suppository today, Writer will call tomorrow to see if she had results. Patient stated she took a dose of liquid Lorazepam which acted a lot faster than her xanax. Writer advised to use Lorazepam every 4 hours as needed. DO NOT TAKE Xanax with Lorazepam, patient stated understanding. Patient looks thinner today, her clothing are loose, she was wearing a large underwear, now in a medium. Writer sat with patient and explained every medication and made hr pill box. Writer ask her to call hospice with any questions or concerns. Patient insurance has changed writer notified [facility], [employer] and [employer]. Copy of new insurance was send to [employer].

## 2022-06-08 NOTE — HOSPICE
Patient is alert and oriented x 3, Family reports increased confusion. Patient reports she thinks she pulled something in left hip, she now having to use a Rolator to get around. Writer will order a cane and a transport chair. Patient went to visit her mother, she had to crawl in the stairs to get back in the house. Patient is using gabapentin as PRN writer explained it is not a PRN medication it is scheduled, it is in your pill box. Patient is still having constipation issues, writer advised to take Senna daily, use suppository as needed. Writer made patient's pill box and reviewed care plan with patient and ask her to call with any questions or concerns.

## 2022-06-15 NOTE — PROGRESS NOTES
Problem: Falls - Risk of  Goal: *Absence of Falls  Description: Document Levi Wing Fall Risk and appropriate interventions in the flowsheet. Outcome: Progressing Towards Goal  Note: Fall Risk Interventions:            Medication Interventions: Bed/chair exit alarm    Elimination Interventions: Bed/chair exit alarm              Problem: Patient Education: Go to Patient Education Activity  Goal: Patient/Family Education  Outcome: Progressing Towards Goal     Problem: Pressure Injury - Risk of  Goal: *Prevention of pressure injury  Description: Document Srikanth Scale and appropriate interventions in the flowsheet. Outcome: Progressing Towards Goal  Note: Pressure Injury Interventions:  Sensory Interventions: Assess changes in LOC,Check visual cues for pain    Moisture Interventions: Apply protective barrier, creams and emollients,Limit adult briefs    Activity Interventions: Pressure redistribution bed/mattress(bed type)    Mobility Interventions: HOB 30 degrees or less    Nutrition Interventions: Offer support with meals,snacks and hydration    Friction and Shear Interventions: HOB 30 degrees or less,Lift sheet                Problem: Patient Education: Go to Patient Education Activity  Goal: Patient/Family Education  Outcome: Progressing Towards Goal     Problem: Hospice Orientation  Goal: Demonstrate understanding of hospice philosophy, plan of care, and home hospice program  Description: The patient/family/caregiver will demonstrate understanding of hospice philosophy, plan of care and the home hospice program as evidenced by participation in meeting the patient's psychosocial, spiritual, medical, and physical needs inclusive of medical supplies/equipment focusing on symptoms. Outcome: Progressing Towards Goal     Problem: Risk for Falls  Goal: Free of falls during inpatient stay  Description: Patient will be free of falls during inpatient stay.   Outcome: Progressing Towards Goal     Problem: Alteration in Mobility  Goal: Remain as independent as possible and remain safe in environment  Description: Patient will remain as independent as possible and remain safe in their environment. Outcome: Progressing Towards Goal     Problem: Pain  Goal: Assess satisfaction of level of comfort and symptom control  Outcome: Progressing Towards Goal  Goal: *Control of acute pain  Outcome: Progressing Towards Goal     Problem: Pressure Injury - Risk of  Goal: *Prevention of pressure injury  Outcome: Progressing Towards Goal  Note: Pressure Injury Interventions:  Sensory Interventions: Assess changes in LOC,Check visual cues for pain    Moisture Interventions: Apply protective barrier, creams and emollients,Limit adult briefs    Activity Interventions: Pressure redistribution bed/mattress(bed type)    Mobility Interventions: HOB 30 degrees or less    Nutrition Interventions: Offer support with meals,snacks and hydration    Friction and Shear Interventions: HOB 30 degrees or less,Lift sheet                Problem: End of Life Process  Goal: Demonstrate understanding of end of life processes  Description: Patient/caregiver will understand end of life processes.   Outcome: Progressing Towards Goal     Problem: Imminent Death  Goal: Collaborate with patient/family/caregiver/interdisciplinary team to minimize and manage end of life symptoms  Outcome: Progressing Towards Goal

## 2022-06-15 NOTE — H&P
[facility] Help to Those in Need  [phone]    Patient Name: Sue Villareal  YOB: 1960    Date of Provider Hospice Visit: 06/15/22    Level of Care:   [x] General Inpatient (GIP)    [] Routine   [] Respite    Current Location of Care:  [] [facility] [] [facility] [] [facility] [] [address] [x] Hospice House THE [facility], patient referred from:  [] [facility] [] [facility] [] [facility] [] [address] [x] Home [] Other:     Principle Hospice Diagnosis: Poorly differentiated cancer with likely primary is lung  Diagnoses RELATED to the terminal prognosis: Metastatic cancer to the brain, spine, adrenal gland, temporal lobe  Other Diagnoses:      HOSPICE SUMMARY     Sue Villareal is a 63y.o. year old who was admitted to [facility] [place]. Patient is a 53-year-old female with a history of metastatic cancer likely lung as primary. Patient with significant cancer associated pain to the point where she was requiring MS Contin 45 mg 3 times a day, oxycodone 20 mg up to 4 times a day and Roxanol 20 mg 4 times a day. This is approximately 335 mg morphine equivalent daily. Despite this regimen, patient is continued have increasing pain but also further decline with increasing confusion, restlessness to include falls. Patient has been less interactive but more anxious and restless. Patient struggling taking any of her oral medication. Given this overall decline and focus totally on comfort along with the fact that she has not been able to tolerate oral medication, patient transition to the [facility] [place] for GIP level care/end-of-life care    The patient's principle diagnosis has resulted in cancer associated pain, anxiety  Refer to LCD     Functionally, the patient's Karnofsky and/or Palliative Performance Scale has declined over a period of weeks and is estimated at 10.  The patient is dependent on the following ADLs: All    Objective information that support this patients limited prognosis includes:   CT imaging  IMPRESSION  1. Interval slight increase in pulmonary nodule burden, including slight  increase in size of a 2.8 cm x 2.2 cm dominant right upper lobe nodule. 2.  Interval increase in size of necrotic right adrenal lesion. 3.  Stable mild mediastinal, right hilar, and upper retroperitoneal  lymphadenopathy. 4.  Slightly increasing small to moderate pericardial effusion    The patient/family chose comfort measures with the support of Hospice. HOSPICE DIAGNOSES   Active Symptoms:  1. Cancer associated pain  2. Restlessness with agitation  3. Hospice care  4. Widely metastatic cancer to include brain, bone, adrenals  5. Seizures     PLAN   1. Patient admitted under [facility] level care she needs frequent nursing assessments, IV medication management as she has struggled with her oral medication, not safe to transition home given the falls, injuries, medication needs. 2. Pain/shortness of breath management- once again patient taking approximately 335 mg oral morphine equivalent which equals approximately 67 mg oral Dilaudid which equals approximately 20 mg IV Dilaudid. Based on this calculation, we will initiate Dilaudid at 4 mg IV every 4 hours scheduled as even the oral medication above was not being effective-this is the high end of the calculation. 3. Since she is not tolerating her Keppra or gabapentin, we will start scheduled Ativan 2 mg IV every 4 hours for both restlessness  4. Comfort meds for all other symptoms  5. Plan reviewed with bedside nursing team  6. Plan reviewed with daughter and friend at the bedside. They just want a make sure she is comfortable and well sedated given the discomfort/agitation she was experiencing at home. I did talk with her daughter and she had received at least 3 doses of Ativan and 3 dose of morphine prior to arrival    7.  and SW to support family needs  8. Disposition: Anticipate death at the hospice house  9.  Hospice Plan of care was reviewed in detail and agree with current plan of care    Prognosis estimated based on 06/15/22 clinical assessment is:   [x] Hours to Days    [] Days to Weeks    [] Other:    Communicated plan of care with: Hospice Case Manager; Hospice IDT; Care Team     GOALS OF CARE     Patient/Medical POA stated Goal of Care: Hospice care    [] I have reviewed and/or updated ACP information in the Advance Care Planning Navigator. This information is available in the [address] link in the patient's chart header. Primary Decision Maker ([address]):   Primary Decision Maker (Active): Ryan Hilliard - [phone]    Resuscitation Status: DNR  If DNR is there a Durable DNR on file? : [x] Yes [] No (If no, complete Durable DNR)    HISTORY     History obtained from: Chart, family, friend- Nancy Turcios    CHIEF COMPLAINT: Pain  The patient is:   [x] Verbal  [] Nonverbal  [] Unresponsive    HPI/SUBJECTIVE: Patient appears to be resting right now, will open her eyes to voice Incon assay low.        REVIEW OF SYSTEMS     The following systems were: [] reviewed  [x] unable to be reviewed    Positive ROS include:  Constitutional: fatigue, weakness, in pain, short of breath  Ears/nose/mouth/throat: increased airway secretions  Respiratory:shortness of breath, wheezing  Gastrointestinal:poor appetite, nausea, vomiting, abdominal pain, constipation, diarrhea  Musculoskeletal:pain, deformities, swelling legs  Neurologic:confusion, hallucinations, weakness  Psychiatric:anxiety, feeling depressed, poor sleep  Endocrine:     Adult Non-Verbal Pain Assessment Score: 6-prior to medication    Face  [] 0   No particular expression or smile  [] 1   Occasional grimace, tearing, frowning, wrinkled forehead  [x] 2   Frequent grimace, tearing, frowning, wrinkled forehead    Activity (movement)  [] 0   Lying quietly, normal position  [] 1   Seeking attention through movement or slow, cautious movement  [x] 2   Restless, excessive activity and/or withdrawal reflexes    Guarding  [] 0   Lying quietly, no positioning of hands over areas of body  [x] 1   Splinting areas of the body, tense  [] 2   Rigid, stiff    Physiology (vital signs)  [x] 0   Stable vital signs  [] 1   Change in any of the following: SBP > 20mm Hg; HR > 20/minute  [] 2   Change in any of the following: SBP > 30mm Hg; HR > 25/minute    Respiratory  [] 0   Baseline RR/SpO2, compliant with ventilator  [x] 1   RR > 10 above baseline, or 5% drop SpO2, mild asynchrony with ventilator  [] 2   RR > 20 above baseline, or 10% drop SpO2, asynchrony with ventilator     FUNCTIONAL ASSESSMENT     Palliative Performance Scale (PPS): 10       PSYCHOSOCIAL/SPIRITUAL ASSESSMENT     Active Problems:    * No active hospital problems. *    Past Medical History:   Diagnosis Date    Cancer [facility]) 2021    LUNGS AND BRAIN AND LYMPH NODES    Hypercholesterolemia     Metastatic carcinoma ([facility] [address] 8/19/2021      Past Surgical History:   Procedure Laterality Date    HX BREAST AUGMENTATION      HX HERNIA REPAIR      IMPLANT BREAST SILICONE/EQ Bilateral 0396    sub-pectoral      Social History     Tobacco Use    Smoking status: Current Every Day Smoker     Packs/day: 0.75     Years: 40.00     Pack years: 30.00    Smokeless tobacco: Never Used   Substance Use Topics    Alcohol use:  Yes     Alcohol/week: 3.0 standard drinks     Types: 3 Cans of beer per week     Comment: socially     Family History   Problem Relation Age of Onset    Depression Mother     Obesity Mother     Heart Disease Mother     Mult Sclerosis Sister         END STAGE    Cancer Brother         TONSIL, WITH METS TO LIVER AND BONES    Heart Disease Maternal Grandfather     Cancer Paternal Grandmother     Anesth Problems Neg Hx       No Known Allergies   Current Facility-Administered Medications   Medication Dose Route Frequency    bisacodyL (DULCOLAX) suppository 10 mg  10 mg Rectal DAILY PRN    LORazepam (ATIVAN) injection 2 mg  2 mg IntraVENous Q4H    LORazepam (ATIVAN) injection 2 mg  2 mg IntraVENous Q15MIN PRN    HYDROmorphone (DILAUDID) injection 4 mg  4 mg IntraVENous Q4H    HYDROmorphone (DILAUDID) injection 4 mg  4 mg IntraVENous Q15MIN PRN        PHYSICAL EXAM     Wt Readings from Last 3 Encounters:   06/01/22 52.2 kg (115 lb)   01/27/22 66 kg (145 lb 8 oz)   12/16/21 65.6 kg (144 lb 9.6 oz)       Visit Vitals  /60 (BP 1 Location: Left lower arm, BP Patient Position: Supine; At rest)   Temp 99.6 °F (37.6 °C)   Resp 22       Supplemental O2  [] Yes  [x] NO  Last bowel movement:     Currently this patient has:  [x] Peripheral IV [] PICC  [] PORT [] ICD    [] Lorenzo Catheter [] NG Tube   [] PEG Tube    [] Rectal Tube [] Drain  [] Other:     Constitutional: Ill-appearing, arouses but then drifts back to sleep, some grimacing especially with movement  Eyes: Reactive  ENMT: Moist  Cardiovascular: Slightly tachycardic  Respiratory: Definitely decreased breath sounds especially in the right, slight increased work of breathing, no accessory muscle use  Gastrointestinal: Soft, thin  Musculoskeletal: Muscle wasting  Skin: Unremarkable  Neurologic: Nonfocal  Psychiatric: Slightly restless  Other:       Pertinent Lab and or Imaging Tests:  Lab Results   Component Value Date/Time    Sodium 132 (L) 02/28/2022 02:20 PM    Potassium 4.5 02/28/2022 02:20 PM    Chloride 101 02/28/2022 02:20 PM    CO2 28 02/28/2022 02:20 PM    Anion gap 3 (L) 02/28/2022 02:20 PM    Glucose 98 02/28/2022 02:20 PM    BUN 7 02/28/2022 02:20 PM    Creatinine 0.68 02/28/2022 02:20 PM    BUN/Creatinine ratio 10 (L) 02/28/2022 02:20 PM    GFR est AA >60 02/28/2022 02:20 PM    GFR est non-AA >60 02/28/2022 02:20 PM    Calcium 9.8 02/28/2022 02:20 PM     Lab Results   Component Value Date/Time    Protein, total 6.9 02/28/2022 02:20 PM    Albumin 2.9 (L) 02/28/2022 02:20 PM           Total time:   Counseling / coordination time:   > 50% counseling / coordination?:

## 2022-06-15 NOTE — HOSPICE
and Kimber Villatoro are out to see patient today. Patient is having alot of pain she has fallen multiple x this week. She stated I just want to go to sleep and not wake up. Inna Lino Talked to Kathy Devine abou a PCA pump or Pallative sedation. Patient chose to have pallative sedation at [facility]. Inna Lino called for a room.  did COVID test which is negative. Writer will call report to [facility].

## 2022-06-15 NOTE — PROGRESS NOTES
1630  Pt arrived at the [facility]. Pt very lethargic but able to answer yes/no questions appropriately. Pt grimaced with transferring from the stretcher to the bed. Lungs are clear but diminished. No cough noted. Pt is on RA. + bowel sounds. Last reported BM was 6-13. Lorenzo placed with straw colored urine out. Pt has +2 pitting edema in LE.  LE cool to touch. Skin is intact. 1645  IV started in R AC.    1648  Pt medicated with Dilaudid and Lorazepam.  Family and friends at the bedside. 1710  Pt relaxed. 1725  Pt grimacing. Brows furrowed. 1732  Pt medicated with Dilaudid and Lorazepam.  Family at the bedside. 1750  Pt resting. 1830  Pt having increased respirations. Gurgling  1848  Pt medicated with PRN meds. 1900  Report  Given. NAME OF PATIENT:  Driss Shah    LEVEL OF CARE:  GIP    REASON FOR GIP:   Pain, despite numerous changes in medications, Medication adjustment that must be monitored 24/7 and Stabilizing treatment that cannot take place at home    *PATIENT REMAINS ELIGIBLE FOR GIP LEVEL OF CARE AS EVIDENCED BY: (MUST BE ADDRESSED OF PATIENT GIP)  Pt is unable to tolerate SL medications due to the volume of liquid. Pt receiving IV meds. REASON FOR RESPITE:  n/a    O2 SAFETY:  RA    FALL INTERVENTIONS PROVIDED:   Implemented/recommended assistive devices and encouraged their use, Implemented/recommended resources for alarm system (personal alarm, bed alarm, call bell, etc.)  and Implemented/recommended environmental changes (remove hazards, lower bed, improve lighting, etc.)    INTERDISPLINARY COMMUNICATION/COLLABORATION:  Physician, MSW, [place] and RN, CNA    NEW MEDICATION INITIATION DOCUMENTATION:  No new medications initiated.       Reason medication is being initiated:  n/a    MD / Provider name consulted re: change in status / initiation of new medication:  n/a    New Symptom(s):  n/a    New Order(s):  n/a    Name of the person notified of the changes: n/a    Name of person being taught:  n/a    Instructions given:  n/a    Side Effects taught:  n/a    Response to teaching:  n/a      COMFORTABLE DYING MEASURE:  Is Patient/family satisfied with symptom level?  yes    DISCHARGE PLAN:  Pt will remain at the [facility] for EOL care. If she should stabilize family with take her back home.

## 2022-06-16 NOTE — PROGRESS NOTES
0700  Report received. 0730  Pt lying in bed, unresponsive. No facial grimacing or moaning. Lungs are diminished with rhonchi through. No cough noted. Pt is on RA.  + distant bowel sounds. Last reported BM was 6-13. Lorenzo is draining riley urine. Skin is warm to touch and intact. Pt has an IV in her R AC. Dressing is clear and intact. Dtrs asleep in the room. 0830  Pt medicated with scheduled meds. 0837  Temp 101.7  Pt medicated with Toradol. Ice packs to arm pits. 0900  Pt skin is cooler to touch. Fingers cyanotic. Hands pale in color. 0950  RR 28    0955  Pt medicated with dilaudid and Lorazepam.    1015  Pt having labored breathing. Using accessory muscles. Dr Iban olmos. Medications increased for comfort. 1024  Pt medicated with dilaudid and Lorazepam   1030 Pt turned and repositioned. Pt cyanotic. Mouth care provided. Secretions removed. Back care provided. Pt lotioned. 1045  Pt having apnea. Continues to be cyanotic. 1120  Neck veins distended. Pt mouth breathing. Facial skin is paler. 1135  Pt medicated with PRN Lorazepam and Dilaudid PRN,    1200  RR shallow, not labored. 1216  Pt medicated with scheduled meds. Pt turned and repositioned. 1225 Pt pronounced by CD RN. TOD 1225. Family tearful but grieving appropriately. [address] home notified. 1500  Body removed. NAME OF PATIENT:  Jeremy Gregory     LEVEL OF CARE:  GIP     REASON FOR GIP:   Pain, despite numerous changes in medications, Medication adjustment that must be monitored 24/7 and Stabilizing treatment that cannot take place at home     *PATIENT REMAINS ELIGIBLE FOR GIP LEVEL OF CARE AS EVIDENCED BY: (MUST BE ADDRESSED OF PATIENT GIP)  Pt is unable to tolerate SL medications due to the volume of liquid.   Pt receiving IV meds.          REASON FOR RESPITE:  n/a     O2 SAFETY:  RA     FALL INTERVENTIONS PROVIDED:   Implemented/recommended assistive devices and encouraged their use, Implemented/recommended resources for alarm system (personal alarm, bed alarm, call bell, etc.)  and Implemented/recommended environmental changes (remove hazards, lower bed, improve lighting, etc.)     INTERDISPLINARY COMMUNICATION/COLLABORATION:  Physician, MSW, [place] and RN, CNA     NEW MEDICATION INITIATION DOCUMENTATION:  No new medications initiated.       Reason medication is being initiated:  n/a     MD / Provider name consulted re: change in status / initiation of new medication:  n/a     New Symptom(s):  n/a     New Order(s):  n/a     Name of the person notified of the changes:  n/a     Name of person being taught:  n/a     Instructions given:  n/a     Side Effects taught:  n/a     Response to teaching:  n/a        COMFORTABLE DYING MEASURE:  Is Patient/family satisfied with symptom level?  yes     DISCHARGE PLAN:  Pt will remain at the [facility] for EOL care.   If she should stabilize family with take her back home.

## 2022-06-16 NOTE — PROGRESS NOTES
[facility]  [phone]    Patient Name: Bob Mireles  YOB: 1960    Date of Provider Hospice Visit: 06/16/22    Level of Care:   [x] General Inpatient (GIP)    [] Routine   [] Respite    Current Location of Care:  [] [facility] [] [facility] [] [facility] [] [facility] [x] Hospice House [facility], patient referred from:  [] [facility] [] [facility] [] [facility] [] [facility] [x] Home [] Other:     Principle Hospice Diagnosis: Poorly differentiated cancer with likely primary is lung  Diagnoses RELATED to the terminal prognosis: Metastatic cancer to the brain, spine, adrenal gland, temporal lobe  Other Diagnoses:      HOSPICE SUMMARY     Bob Mireles is a 63y.o. year old who was admitted to [address] [place] [address]. Patient is a 82-year-old female with a history of metastatic cancer likely lung as primary. Patient with significant cancer associated pain to the point where she was requiring MS Contin 45 mg 3 times a day, oxycodone 20 mg up to 4 times a day and Roxanol 20 mg 4 times a day. This is approximately 335 mg morphine equivalent daily. Despite this regimen, patient is continued have increasing pain but also further decline with increasing confusion, restlessness to include falls. Patient has been less interactive but more anxious and restless. Patient struggling taking any of her oral medication. Given this overall decline and focus totally on comfort along with the fact that she has not been able to tolerate oral medication, patient transition to the community hospice house for GIP level care/end-of-life care    The patient's principle diagnosis has resulted in cancer associated pain, anxiety  Refer to LCD     Functionally, the patient's Karnofsky and/or Palliative Performance Scale has declined over a period of weeks and is estimated at 10.  The patient is dependent on the following ADLs: All    Objective information that support this patients limited prognosis includes:   CT imaging  IMPRESSION  1. Interval slight increase in pulmonary nodule burden, including slight  increase in size of a 2.8 cm x 2.2 cm dominant right upper lobe nodule. 2.  Interval increase in size of necrotic right adrenal lesion. 3.  Stable mild mediastinal, right hilar, and upper retroperitoneal  lymphadenopathy. 4.  Slightly increasing small to moderate pericardial effusion    The patient/family chose comfort measures with the support of Hospice. HOSPICE DIAGNOSES   Active Symptoms:  1. Cancer associated pain  2. Restlessness with agitation  3. Hospice care  4. Widely metastatic cancer to include brain, bone, adrenals  5. Seizures  6. Secretions  7. Fever     PLAN   1. Patient will continue GIP level care she needs frequent nursing assessments, IV medication management, was not tolerating oral medication to manage her symptoms in the home setting. Patient has required multiple as needed doses of Dilaudid and Ativan overnight for a total of 5-6 additional doses of each. We will make significant adjustments in her medication today. She also is having evidence of secretions  2. Pain/shortness of breath management- based on the above, will increase dilaudid to 6 mg IV every 3 scheduled and every 15 min prn pain/SOB  3. Increase Ativan to 4 mg IV every 3 hours scheduled and every 15 minutes as needed for restlessness and seizure prevention  4. Robinul 0.2 mg IV every 4 hours  5. Comfort meds for all other symptoms  6. Plan reviewed with bedside nursing team  7. Plan reviewed with daughters at the bedside. Patient's medical POA/friend-Marly Dennis also present. They agree with plan of care    8.  and SW to support family needs  9. Disposition: Anticipate death at the hospice house  10.  Hospice Plan of care was reviewed in detail and agree with current plan of care    Prognosis estimated based on 06/16/22 clinical assessment is:   [x] Hours to Days    [] Days to Weeks    [] Other:    Communicated plan of care with: Hospice Case Manager; Hospice IDT; Care Team     GOALS OF CARE     Patient/Medical POA stated Goal of Care: Hospice care    [x] I have reviewed and/or updated ACP information in the Advance Care Planning Navigator. This information is available in the [address] link in the patient's chart header. Primary Decision Maker ([address]):   Primary Decision Maker (Active): Jorge Nieves - [phone]    Resuscitation Status: DNR  If DNR is there a Durable DNR on file? : [x] Yes [] No (If no, complete Durable DNR)    HISTORY     History obtained from: Chart, family, friend- Howiecorbin Ariana    CHIEF COMPLAINT: Pain  The patient is:   [x] Verbal  [] Nonverbal  [] Unresponsive    HPI/SUBJECTIVE: Patient appears to be resting right now, will open her eyes to voice Incon assay low. 6/16-patient is essentially unresponsive.   Does show some evidence of increased work of breathing       REVIEW OF SYSTEMS     The following systems were: [] reviewed  [x] unable to be reviewed    Positive ROS include:  Constitutional: fatigue, weakness, in pain, short of breath  Ears/nose/mouth/throat: increased airway secretions  Respiratory:shortness of breath, wheezing  Gastrointestinal:poor appetite, nausea, vomiting, abdominal pain, constipation, diarrhea  Musculoskeletal:pain, deformities, swelling legs  Neurologic:confusion, hallucinations, weakness  Psychiatric:anxiety, feeling depressed, poor sleep  Endocrine:     Adult Non-Verbal Pain Assessment Score: 4  Face  [] 0   No particular expression or smile  [x] 1   Occasional grimace, tearing, frowning, wrinkled forehead  [] 2   Frequent grimace, tearing, frowning, wrinkled forehead    Activity (movement)  [] 0   Lying quietly, normal position  [x] 1   Seeking attention through movement or slow, cautious movement  [] 2   Restless, excessive activity and/or withdrawal reflexes    Guarding  [x] 0   Lying quietly, no positioning of hands over areas of body  [] 1   Splinting areas of the body, tense  [] 2   Rigid, stiff    Physiology (vital signs)  [x] 0   Stable vital signs  [] 1   Change in any of the following: SBP > 20mm Hg; HR > 20/minute  [] 2   Change in any of the following: SBP > 30mm Hg; HR > 25/minute    Respiratory  [] 0   Baseline RR/SpO2, compliant with ventilator  [] 1   RR > 10 above baseline, or 5% drop SpO2, mild asynchrony with ventilator  [x] 2   RR > 20 above baseline, or 10% drop SpO2, asynchrony with ventilator     FUNCTIONAL ASSESSMENT     Palliative Performance Scale (PPS): 10       PSYCHOSOCIAL/SPIRITUAL ASSESSMENT     Active Problems:    * No active hospital problems. *    Past Medical History:   Diagnosis Date    Cancer [facility]) 2021    LUNGS AND BRAIN AND LYMPH NODES    Hypercholesterolemia     Metastatic carcinoma ([facility] [address] 8/19/2021      Past Surgical History:   Procedure Laterality Date    HX BREAST AUGMENTATION      HX HERNIA REPAIR      IMPLANT BREAST SILICONE/EQ Bilateral 7332    sub-pectoral      Social History     Tobacco Use    Smoking status: Current Every Day Smoker     Packs/day: 0.75     Years: 40.00     Pack years: 30.00    Smokeless tobacco: Never Used   Substance Use Topics    Alcohol use:  Yes     Alcohol/week: 3.0 standard drinks     Types: 3 Cans of beer per week     Comment: socially     Family History   Problem Relation Age of Onset    Depression Mother     Obesity Mother     Heart Disease Mother     Mult Sclerosis Sister         END STAGE    Cancer Brother         TONSIL, WITH METS TO LIVER AND BONES    Heart Disease Maternal Grandfather     Cancer Paternal Grandmother     Anesth Problems Neg Hx       No Known Allergies   Current Facility-Administered Medications   Medication Dose Route Frequency    HYDROmorphone (DILAUDID) injection 6 mg  6 mg IntraVENous Q3H    LORazepam (ATIVAN) injection 4 mg  4 mg IntraVENous Q3H    HYDROmorphone (DILAUDID) injection 6 mg  6 mg IntraVENous Q15MIN PRN    LORazepam (ATIVAN) injection 4 mg  4 mg IntraVENous Q15MIN PRN    glycopyrrolate (ROBINUL) injection 0.2 mg  0.2 mg IntraVENous Q4H    bisacodyL (DULCOLAX) suppository 10 mg  10 mg Rectal DAILY PRN    ketorolac (TORADOL) injection 30 mg  30 mg IntraVENous Q6H PRN        PHYSICAL EXAM     Wt Readings from Last 3 Encounters:   06/01/22 52.2 kg (115 lb)   01/27/22 66 kg (145 lb 8 oz)   12/16/21 65.6 kg (144 lb 9.6 oz)       Visit Vitals  BP (!) 64/40   Pulse (!) 57   Temp (!) 100.8 °F (38.2 °C)   Resp 16   SpO2 (!) 57%       Supplemental O2  [] Yes  [x] NO  Last bowel movement:     Currently this patient has:  [x] Peripheral IV [] PICC  [] PORT [] ICD    [] Lorenzo Catheter [] NG Tube   [] PEG Tube    [] Rectal Tube [] Drain  [] Other:     Constitutional: Ill-appearing, ashen, minimally responsive, febrile  Eyes: Closed  ENMT: Dry  Cardiovascular: Slightly tachycardic  Respiratory: Mild increased work of breathing, minimal accessory muscle use, secretions, decreased breath sounds throughout  Gastrointestinal: Soft, thin  Musculoskeletal: Muscle wasting  Skin: Unremarkable  Neurologic: Nonfocal  Psychiatric: Slightly restless  Other:       Pertinent Lab and or Imaging Tests:  Lab Results   Component Value Date/Time    Sodium 132 (L) 02/28/2022 02:20 PM    Potassium 4.5 02/28/2022 02:20 PM    Chloride 101 02/28/2022 02:20 PM    CO2 28 02/28/2022 02:20 PM    Anion gap 3 (L) 02/28/2022 02:20 PM    Glucose 98 02/28/2022 02:20 PM    BUN 7 02/28/2022 02:20 PM    Creatinine 0.68 02/28/2022 02:20 PM    BUN/Creatinine ratio 10 (L) 02/28/2022 02:20 PM    GFR est AA >60 02/28/2022 02:20 PM    GFR est non-AA >60 02/28/2022 02:20 PM    Calcium 9.8 02/28/2022 02:20 PM     Lab Results   Component Value Date/Time    Protein, total 6.9 02/28/2022 02:20 PM    Albumin 2.9 (L) 02/28/2022 02:20 PM           Total time:   Counseling / coordination time:   > 50% counseling / coordination?:

## 2022-06-16 NOTE — HOSPICE
5  Patient's daughter called out stating patient is working hard to breath. Assessed patient who had furrowed brow and is using accessory muscles to breath. Gave prn dose of Dilaudid 4 mg and Ativan 2 mg.

## 2022-06-16 NOTE — PROGRESS NOTES
Called to patient's room by family, patient appears to have stopped breathing. Patient found to be without signs of life after 2 minutes of ausculation.      Time of Death 6/16/2022 at [phone]

## 2022-06-16 NOTE — PROGRESS NOTES
Problem: Falls - Risk of  Goal: *Absence of Falls  Description: Document Marvin Seay Fall Risk and appropriate interventions in the flowsheet. 6/16/2022 1449 by Joanna Colón RN  Outcome: Resolved/Met  Note: Fall Risk Interventions:            Medication Interventions: Bed/chair exit alarm    Elimination Interventions: Bed/chair exit alarm,Call light in reach           6/16/2022 1153 by Joanna Colón RN  Outcome: Progressing Towards Goal  Note: Fall Risk Interventions:            Medication Interventions: Bed/chair exit alarm    Elimination Interventions: Bed/chair exit alarm,Call light in reach              Problem: Patient Education: Go to Patient Education Activity  Goal: Patient/Family Education  6/16/2022 1449 by Joanna Colón RN  Outcome: Resolved/Met  6/16/2022 1153 by Joanna Colón RN  Outcome: Progressing Towards Goal     Problem: Pressure Injury - Risk of  Goal: *Prevention of pressure injury  Description: Document Srikanth Scale and appropriate interventions in the flowsheet.   6/16/2022 1449 by Joanna Colón RN  Outcome: Resolved/Met  Note: Pressure Injury Interventions:  Sensory Interventions: Assess changes in LOC,Check visual cues for pain    Moisture Interventions: Absorbent underpads,Maintain skin hydration (lotion/cream)    Activity Interventions: Pressure redistribution bed/mattress(bed type)    Mobility Interventions: Float heels,HOB 30 degrees or less    Nutrition Interventions:  (Unable to tolerate PO intake)    Friction and Shear Interventions: HOB 30 degrees or less             6/16/2022 1153 by Joanna Colón RN  Outcome: Progressing Towards Goal  Note: Pressure Injury Interventions:  Sensory Interventions: Assess changes in LOC,Check visual cues for pain    Moisture Interventions: Absorbent underpads,Maintain skin hydration (lotion/cream)    Activity Interventions: Pressure redistribution bed/mattress(bed type)    Mobility Interventions: Float heels,HOB 30 degrees or less    Nutrition Interventions:  (Unable to tolerate PO intake)    Friction and Shear Interventions: HOB 30 degrees or less                Problem: Patient Education: Go to Patient Education Activity  Goal: Patient/Family Education  6/16/2022 1449 by Dipti Carl RN  Outcome: Resolved/Met  6/16/2022 1153 by Dipti Carl RN  Outcome: Progressing Towards Goal     Problem: Hospice Orientation  Goal: Demonstrate understanding of hospice philosophy, plan of care, and home hospice program  Description: The patient/family/caregiver will demonstrate understanding of hospice philosophy, plan of care and the home hospice program as evidenced by participation in meeting the patient's psychosocial, spiritual, medical, and physical needs inclusive of medical supplies/equipment focusing on symptoms. 6/16/2022 1449 by Dipti Carl RN  Outcome: Resolved/Met  6/16/2022 1153 by Dipti aCrl RN  Outcome: Progressing Towards Goal     Problem: Risk for Falls  Goal: Free of falls during inpatient stay  Description: Patient will be free of falls during inpatient stay. 6/16/2022 1449 by Dipti Carl RN  Outcome: Resolved/Met  6/16/2022 1153 by Dipti Carl RN  Outcome: Progressing Towards Goal     Problem: Alteration in Mobility  Goal: Remain as independent as possible and remain safe in environment  Description: Patient will remain as independent as possible and remain safe in their environment.   6/16/2022 1449 by Dipti Carl RN  Outcome: Resolved/Met  6/16/2022 1153 by Dipti Carl RN  Outcome: Progressing Towards Goal     Problem: Pain  Goal: Assess satisfaction of level of comfort and symptom control  6/16/2022 1449 by Dipti Carl RN  Outcome: Resolved/Met  6/16/2022 1153 by Dipti Carl RN  Outcome: Progressing Towards Goal  Goal: *Control of acute pain  6/16/2022 1449 by Dipti Carl RN  Outcome: Resolved/Met  6/16/2022 1153 by Dipti Carl RN  Outcome: Progressing Towards Goal     Problem: Pressure Injury - Risk of  Goal: *Prevention of pressure injury  6/16/2022 1449 by Gerard Jimenez RN  Outcome: Resolved/Met  Note: Pressure Injury Interventions:  Sensory Interventions: Assess changes in LOC,Check visual cues for pain    Moisture Interventions: Absorbent underpads,Maintain skin hydration (lotion/cream)    Activity Interventions: Pressure redistribution bed/mattress(bed type)    Mobility Interventions: Float heels,HOB 30 degrees or less    Nutrition Interventions:  (Unable to tolerate PO intake)    Friction and Shear Interventions: HOB 30 degrees or less             6/16/2022 1153 by Gerard Jimenez RN  Outcome: Progressing Towards Goal  Note: Pressure Injury Interventions:  Sensory Interventions: Assess changes in LOC,Check visual cues for pain    Moisture Interventions: Absorbent underpads,Maintain skin hydration (lotion/cream)    Activity Interventions: Pressure redistribution bed/mattress(bed type)    Mobility Interventions: Float heels,HOB 30 degrees or less    Nutrition Interventions:  (Unable to tolerate PO intake)    Friction and Shear Interventions: HOB 30 degrees or less                Problem: End of Life Process  Goal: Demonstrate understanding of end of life processes  Description: Patient/caregiver will understand end of life processes.   6/16/2022 1449 by Gerard Jimenez RN  Outcome: Resolved/Met  6/16/2022 1153 by Gerard Jimenez RN  Outcome: Progressing Towards Goal     Problem: Imminent Death  Goal: Collaborate with patient/family/caregiver/interdisciplinary team to minimize and manage end of life symptoms  6/16/2022 1449 by Gerard Jimenez RN  Outcome: Resolved/Met  6/16/2022 1153 by Gerard Jimenez RN  Outcome: Progressing Towards Goal

## 2022-06-16 NOTE — PROGRESS NOTES
Problem: Falls - Risk of  Goal: *Absence of Falls  Description: Document Ross Fall Risk and appropriate interventions in the flowsheet. Outcome: Progressing Towards Goal  Note: Fall Risk Interventions:            Medication Interventions: Bed/chair exit alarm    Elimination Interventions: Bed/chair exit alarm,Call light in reach              Problem: Patient Education: Go to Patient Education Activity  Goal: Patient/Family Education  Outcome: Progressing Towards Goal     Problem: Pressure Injury - Risk of  Goal: *Prevention of pressure injury  Description: Document Srikanth Scale and appropriate interventions in the flowsheet. Outcome: Progressing Towards Goal  Note: Pressure Injury Interventions:  Sensory Interventions: Assess changes in LOC,Check visual cues for pain    Moisture Interventions: Absorbent underpads,Maintain skin hydration (lotion/cream)    Activity Interventions: Pressure redistribution bed/mattress(bed type)    Mobility Interventions: Float heels,HOB 30 degrees or less    Nutrition Interventions:  (Unable to tolerate PO intake)    Friction and Shear Interventions: HOB 30 degrees or less                Problem: Patient Education: Go to Patient Education Activity  Goal: Patient/Family Education  Outcome: Progressing Towards Goal     Problem: Hospice Orientation  Goal: Demonstrate understanding of hospice philosophy, plan of care, and home hospice program  Description: The patient/family/caregiver will demonstrate understanding of hospice philosophy, plan of care and the home hospice program as evidenced by participation in meeting the patient's psychosocial, spiritual, medical, and physical needs inclusive of medical supplies/equipment focusing on symptoms. Outcome: Progressing Towards Goal     Problem: Risk for Falls  Goal: Free of falls during inpatient stay  Description: Patient will be free of falls during inpatient stay.   Outcome: Progressing Towards Goal     Problem: Alteration in Mobility  Goal: Remain as independent as possible and remain safe in environment  Description: Patient will remain as independent as possible and remain safe in their environment. Outcome: Progressing Towards Goal     Problem: Pain  Goal: Assess satisfaction of level of comfort and symptom control  Outcome: Progressing Towards Goal  Goal: *Control of acute pain  Outcome: Progressing Towards Goal     Problem: Pressure Injury - Risk of  Goal: *Prevention of pressure injury  Outcome: Progressing Towards Goal  Note: Pressure Injury Interventions:  Sensory Interventions: Assess changes in LOC,Check visual cues for pain    Moisture Interventions: Absorbent underpads,Maintain skin hydration (lotion/cream)    Activity Interventions: Pressure redistribution bed/mattress(bed type)    Mobility Interventions: Float heels,HOB 30 degrees or less    Nutrition Interventions:  (Unable to tolerate PO intake)    Friction and Shear Interventions: HOB 30 degrees or less                Problem: End of Life Process  Goal: Demonstrate understanding of end of life processes  Description: Patient/caregiver will understand end of life processes.   Outcome: Progressing Towards Goal     Problem: Imminent Death  Goal: Collaborate with patient/family/caregiver/interdisciplinary team to minimize and manage end of life symptoms  Outcome: Progressing Towards Goal

## 2022-06-16 NOTE — PROGRESS NOTES
1930-received report from [facility], [address] [place] [address].  1945-pt resting quietly with her two daughters and friends at the bedside. Respirations unlabored, lungs coarse. Pt observed with periodic twitching of the left shoulder. Neutral facial expression, opened eyes slightly at times to verbal stimuli. Daughters requested not to turn pt at this time as they observed her to be more comfortable on her back. 2007-administered scheduled medications. Pt resting quietly. 2043-pt family noticed pt grimacing and restless. Administered PRN Dilaudid and Ativan.  2129-pt family expressed pt grimacing upon inspiration. Administered PRN Dilaudid and Ativan. 2319-pt resting. Increased audible secretions. Pt family expressed pt grimacing again. Administered PRN Dilaudid and Ativan. 0041-pt resting, opening eyes periodically to verbal stimuli. Respirations even and unlabored, audible secretions noted. Administered scheduled Dilaudid and Ativan. 0120-pt sleeping comfortably, respirations unlabored. 0240-pt sleeping, audible secretions. 0330-pt resting, opening eyes periodically. 0401-pt observed with increased work of breathing, and more audible secretions. Suggested to daughters to turn pt to side for relief/comfort and daughters refused stating Montserrat Virgen would not have wanted to be moved. It's too painful for her hip. If she was alert, lying on her back would be her position of choice. \" Administered scheduled Dialudid and Ativan. Emptied 500ml of riley urine from weiss. 0510-pt w/increased respirations and gurgling. 0619-pt w/increased gurgling. Daughters refused the offer to turn pt.for relief and comfort.   0700-Report given to Fer pena RN.                NAME OF PATIENT:  Rossana Malou    LEVEL OF CARE:  GIP    REASON FOR GIP:   Pain, despite numerous changes in medications, Medication adjustment that must be monitored 24/7 and Stabilizing treatment that cannot take place at home    *PATIENT REMAINS ELIGIBLE FOR GIP LEVEL OF CARE AS EVIDENCED BY: (MUST BE ADDRESSED OF PATIENT GIP) needing frequent PRN medications and assessments, pain that cannot be managed at home. REASON FOR RESPITE:  N/A    O2 SAFETY:  N/A    FALL INTERVENTIONS PROVIDED:   Implemented/recommended resources for alarm system (personal alarm, bed alarm, call bell, etc.) , Implemented/recommended environmental changes (remove hazards, lower bed, improve lighting, etc.) and Implemented/recommended increased supervision/assistance    INTERDISPLINARY COMMUNICATION/COLLABORATION:  Physician, MSW, Gris and RN, CNA    NEW MEDICATION INITIATION DOCUMENTATION:  N/A    Reason medication is being initiated:  N/A    MD / Provider name consulted re: change in status / initiation of new medication:  N/A    New Symptom(s):  N/A    New Order(s):  N/A    Name of the person notified of the changes:  N/A    Name of person being taught:  N/A    Instructions given:  N/A    Side Effects taught:  N/A    Response to teaching:  N/A      COMFORTABLE DYING MEASURE:  Is Patient/family satisfied with symptom level?   Yes    DISCHARGE PLAN:  Home with hospice

## 2022-06-17 ENCOUNTER — HOME CARE VISIT (OUTPATIENT)
Dept: HOSPICE | Facility: HOSPICE | Age: 62
End: 2022-06-17
Payer: COMMERCIAL

## 2022-06-18 ENCOUNTER — HOME CARE VISIT (OUTPATIENT)
Dept: HOSPICE | Facility: HOSPICE | Age: 62
End: 2022-06-18
Payer: COMMERCIAL

## 2022-06-30 ENCOUNTER — HOME CARE VISIT (OUTPATIENT)
Dept: HOSPICE | Facility: HOSPICE | Age: 62
End: 2022-06-30
Payer: COMMERCIAL
